# Patient Record
Sex: MALE | Race: BLACK OR AFRICAN AMERICAN | NOT HISPANIC OR LATINO | Employment: OTHER | URBAN - METROPOLITAN AREA
[De-identification: names, ages, dates, MRNs, and addresses within clinical notes are randomized per-mention and may not be internally consistent; named-entity substitution may affect disease eponyms.]

---

## 2017-02-14 ENCOUNTER — OFFICE VISIT (OUTPATIENT)
Dept: FAMILY MEDICINE | Facility: CLINIC | Age: 62
End: 2017-02-14
Payer: MEDICARE

## 2017-02-14 VITALS
HEIGHT: 72 IN | HEART RATE: 60 BPM | SYSTOLIC BLOOD PRESSURE: 136 MMHG | WEIGHT: 217.5 LBS | DIASTOLIC BLOOD PRESSURE: 88 MMHG | BODY MASS INDEX: 29.46 KG/M2

## 2017-02-14 DIAGNOSIS — M51.36 DDD (DEGENERATIVE DISC DISEASE), LUMBAR: ICD-10-CM

## 2017-02-14 DIAGNOSIS — M48.02 CERVICAL STENOSIS OF SPINE: Primary | ICD-10-CM

## 2017-02-14 DIAGNOSIS — M54.12 CERVICAL RADICULOPATHY: ICD-10-CM

## 2017-02-14 PROCEDURE — 99212 OFFICE O/P EST SF 10 MIN: CPT | Mod: PBBFAC,PO | Performed by: FAMILY MEDICINE

## 2017-02-14 PROCEDURE — 99999 PR PBB SHADOW E&M-EST. PATIENT-LVL II: CPT | Mod: PBBFAC,,, | Performed by: FAMILY MEDICINE

## 2017-02-14 PROCEDURE — 99213 OFFICE O/P EST LOW 20 MIN: CPT | Mod: S$PBB,,, | Performed by: FAMILY MEDICINE

## 2017-02-14 RX ORDER — HYDROCODONE BITARTRATE AND ACETAMINOPHEN 10; 325 MG/1; MG/1
1 TABLET ORAL 4 TIMES DAILY
Qty: 120 TABLET | Refills: 0 | Status: SHIPPED | OUTPATIENT
Start: 2017-04-13 | End: 2017-05-05

## 2017-02-14 RX ORDER — HYDROCODONE BITARTRATE AND ACETAMINOPHEN 10; 325 MG/1; MG/1
1 TABLET ORAL 4 TIMES DAILY
Qty: 120 TABLET | Refills: 0 | Status: SHIPPED | OUTPATIENT
Start: 2017-03-13 | End: 2017-05-05

## 2017-02-14 RX ORDER — GABAPENTIN 300 MG/1
600 CAPSULE ORAL 3 TIMES DAILY
Qty: 540 CAPSULE | Refills: 3 | Status: SHIPPED | OUTPATIENT
Start: 2017-02-14 | End: 2017-05-11 | Stop reason: SDUPTHER

## 2017-02-14 RX ORDER — HYDROCODONE BITARTRATE AND ACETAMINOPHEN 10; 325 MG/1; MG/1
1 TABLET ORAL 4 TIMES DAILY
Qty: 120 TABLET | Refills: 0 | Status: SHIPPED | OUTPATIENT
Start: 2017-02-14 | End: 2017-05-05 | Stop reason: SDUPTHER

## 2017-02-14 NOTE — PROGRESS NOTES
The patient presents today fu chronic pain management generally tolerating w HC 10   Hx cervical stenosis and lumbar disc disease w chonic pain and radiculopathy. He has DJD todd bilateral knees.  HBP and HLP controlled      Past Medical History:  Past Medical History   Diagnosis Date    Anticoagulant long-term use      aspirn    Asthma     Cervical stenosis of spine     DDD (degenerative disc disease), lumbar     Depression     DJD (degenerative joint disease)     Hyperlipidemia     Hypertension     Intervertebral disc protrusion     Skin abnormalities      Past Surgical History   Procedure Laterality Date    Colonoscopy      Multiple tooth extractions       Review of patient's allergies indicates:   Allergen Reactions    Ace inhibitors Swelling     Current Outpatient Prescriptions on File Prior to Visit   Medication Sig Dispense Refill    albuterol 90 mcg/actuation inhaler Inhale 2 puffs into the lungs every 6 (six) hours as needed for Wheezing. 54 g 4    aspirin 81 MG Chew Take 1 tablet (81 mg total) by mouth once daily. 90 tablet 3    atenolol (TENORMIN) 25 MG tablet Take 0.5 tablets (12.5 mg total) by mouth once daily. 90 tablet 3    diltiazem (DILACOR XR) 180 MG CDCR Take 1 capsule (180 mg total) by mouth 2 (two) times daily. 180 capsule 3    fluoxetine (PROZAC) 20 MG capsule Take 1 capsule (20 mg total) by mouth once daily. 90 capsule 3    gabapentin (NEURONTIN) 300 MG capsule Take 1 capsule (300 mg total) by mouth 4 (four) times daily. 360 capsule 3    hydrochlorothiazide (HYDRODIURIL) 12.5 MG Tab Take 1 tablet (12.5 mg total) by mouth once daily. 90 tablet 3    hydrocodone-acetaminophen 10-325mg (NORCO)  mg Tab Take 1 tablet by mouth 4 (four) times daily. 120 tablet 0    hydrocodone-acetaminophen 10-325mg (NORCO)  mg Tab Take 1 tablet by mouth 4 (four) times daily. 120 tablet 0    hydrocodone-acetaminophen 10-325mg (NORCO)  mg Tab Take 1 tablet by mouth 4 (four) times  daily. 120 tablet 0    ibuprofen (ADVIL,MOTRIN) 800 MG tablet Take 1 tablet (800 mg total) by mouth 4 (four) times daily. 360 tablet 3    ketoconazole (NIZORAL) 2 % shampoo Apply topically 3 (three) times a week. 120 mL 3    loratadine (CLARITIN) 10 mg tablet Take 1 tablet (10 mg total) by mouth once daily. 90 tablet 3    meloxicam (MOBIC) 15 MG tablet Take 0.5 tablets (7.5 mg total) by mouth once daily. 90 tablet 3    potassium chloride (KLOR-CON) 10 MEQ TbSR Take 2 tablets (20 mEq total) by mouth 2 (two) times daily. 180 tablet 3    simvastatin (ZOCOR) 20 MG tablet Take 1 tablet (20 mg total) by mouth every evening. 90 tablet 3    terbinafine HCl (LAMISIL) 1 % cream Apply topically 2 (two) times daily. 42 g 3    travoprost, benzalkonium, (TRAVATAN) 0.004 % ophthalmic solution Place 1 drop into both eyes nightly. 5 mL 3    triamcinolone acetonide 0.1% (KENALOG) 0.1 % Lotn Apply topically 2 (two) times daily. 60 mL 3    white petrolatum-mineral oil (EUCERIN) Crea Apply topically as needed. 454 g 3     No current facility-administered medications on file prior to visit.      Social History     Social History    Marital status:      Spouse name: N/A    Number of children: 3    Years of education: N/A     Occupational History    Not on file.     Social History Main Topics    Smoking status: Former Smoker     Packs/day: 0.25     Quit date: 5/12/2015    Smokeless tobacco: Never Used    Alcohol use No    Drug use: No    Sexual activity: Not on file     Other Topics Concern    Not on file     Social History Narrative     History reviewed. No pertinent family history.      ROS:GENERAL: No fever, chills, fatigability or weight loss.  SKIN: No rashes, itching or changes in color or texture of skin.  HEAD: No headaches or recent head trauma.EYES: Visual acuity fine. No photophobia, ocular pain or diplopia.EARS: Denies ear pain, discharge or vertigo.NOSE: No loss of smell, no epistaxis or postnasal  drip.MOUTH & THROAT: No hoarseness or change in voice. No excessive gum bleeding.NODES: Denies swollen glands.  CHEST: Denies CHESTER, cyanosis, wheezing, cough and sputum production.  CARDIOVASCULAR: Denies chest pain, PND, orthopnea or reduced exercise tolerance.  ABDOMEN: Appetite fine. No weight loss. Denies diarrhea, abdominal pain, hematemesis or blood in stool.  URINARY: No flank pain, dysuria or hematuria.  PERIPHERAL VASCULAR: No claudication or cyanosis.  MUSCULOSKELETAL: See above.  NEUROLOGIC: No history of seizures, paralysis, alteration of gait or coordination.  PE:   HEAD: Normocephalic, atraumatic.EYES: PERRL. EOMI.   EARS: TM's intact. Light reflex normal. No retraction or perforation.   NOSE: Mucosa pink. Airway clear.MOUTH & THROAT: No tonsillar enlargement. No pharyngeal erythema or exudate. No stridor.  NODES: No cervical, axillary or inguinal lymph node enlargement.  CHEST: Lungs clear to auscultation except scattered ronchi   CARDIOVASCULAR: Normal S1, S2. No rubs, murmurs or gallops.  ABDOMEN: Bowel sounds normal. Not distended. Soft. No tenderness or masses.  MUSCULOSKELETAL: Tender bilateral paracervical paralumbar neg SLR, tender knees and shoulders lt injected     NEUROLOGIC: Cranial Nerves: II-XII grossly intact.  Motor: 5/5 strength major flexors/extensors.  DTR's: Knees, Ankles 2+ and equal bilaterally; downgoing toes.  Sensory: Decr sensations hands  Gait & Posture: Antalgic gait     Impression: Cerv stenosis  Shoulder arthropathy  Radiculopathy  HBP  HLP     Plan:Rev med recs   Rev pain management hx of failed SOLIS lumbar   FU  Ortho  consult  Reviewed meds ,pain management ,risks benefits meds including narcotic issues continue [facility]

## 2017-04-28 ENCOUNTER — PATIENT OUTREACH (OUTPATIENT)
Dept: ADMINISTRATIVE | Facility: HOSPITAL | Age: 62
End: 2017-04-28

## 2017-04-28 DIAGNOSIS — Z13.220 SCREENING CHOLESTEROL LEVEL: Primary | ICD-10-CM

## 2017-04-28 NOTE — LETTER
April 28, 2017    Migue Sellers  [address] [address] [place] [address]           [facility]  [address]  [facility] [address]  Phone: [phone] Dear Mr. Sellers:    [facility] is committed to your overall health.  To help you get the most out of each of your visits, we will review your information to make sure you are up to date on all of your recommended tests and/or procedures.      Champ Kramer MD has found that you may be due for   Health Maintenance Due   Topic    Hepatitis C Screening     Lipid Panel       If you have had any of the above done at another facility, please bring the records or information with you so that your record at [facility] will be complete.    If you are currently taking medication, please bring it with you to your appointment for review.    We will be happy to assist you with scheduling any necessary appointments or you may contact the [facility] appointment desk at [phone] to schedule at your convenience.     Thank you for choosing [facility] for your healthcare needs,      If you have any questions or concerns, please don't hesitate to call.    Sincerely,    Ashely LA LPN  Care Coordination Department  [facility]

## 2017-05-05 ENCOUNTER — TELEPHONE (OUTPATIENT)
Dept: FAMILY MEDICINE | Facility: CLINIC | Age: 62
End: 2017-05-05

## 2017-05-05 RX ORDER — HYDROCODONE BITARTRATE AND ACETAMINOPHEN 10; 325 MG/1; MG/1
1 TABLET ORAL 4 TIMES DAILY
Qty: 360 TABLET | Refills: 0 | Status: SHIPPED | OUTPATIENT
Start: 2017-05-05 | End: 2017-08-03 | Stop reason: SDUPTHER

## 2017-05-05 NOTE — TELEPHONE ENCOUNTER
Pt has an appt on 5/11 for med refill. He is now wanting to get rx through mail order. He is asking if the rx can be sent now so he doesn't have a lapse in medication.

## 2017-05-05 NOTE — TELEPHONE ENCOUNTER
----- Message from Joan Gonzalez sent at 5/5/2017  1:57 PM CDT -----  Contact: Patient  Patient needs to order [id number]/Acetaminophen 325MG Tab thru [facility] Next Day delivery for 90 days at no cost.  Please call @ [phone] and advise when done.  Thanks/NORY

## 2017-05-11 ENCOUNTER — OFFICE VISIT (OUTPATIENT)
Dept: FAMILY MEDICINE | Facility: CLINIC | Age: 62
End: 2017-05-11
Payer: MEDICARE

## 2017-05-11 VITALS — WEIGHT: 217.63 LBS | HEIGHT: 72 IN | BODY MASS INDEX: 29.48 KG/M2

## 2017-05-11 DIAGNOSIS — M48.02 CERVICAL STENOSIS OF SPINE: Primary | ICD-10-CM

## 2017-05-11 DIAGNOSIS — M51.36 DDD (DEGENERATIVE DISC DISEASE), LUMBAR: ICD-10-CM

## 2017-05-11 DIAGNOSIS — J06.9 UPPER RESPIRATORY TRACT INFECTION, UNSPECIFIED TYPE: ICD-10-CM

## 2017-05-11 DIAGNOSIS — M54.12 CERVICAL RADICULOPATHY: ICD-10-CM

## 2017-05-11 PROCEDURE — 99213 OFFICE O/P EST LOW 20 MIN: CPT | Mod: S$PBB,,, | Performed by: FAMILY MEDICINE

## 2017-05-11 PROCEDURE — 99212 OFFICE O/P EST SF 10 MIN: CPT | Mod: PBBFAC,PO | Performed by: FAMILY MEDICINE

## 2017-05-11 PROCEDURE — 99999 PR PBB SHADOW E&M-EST. PATIENT-LVL II: CPT | Mod: PBBFAC,,, | Performed by: FAMILY MEDICINE

## 2017-05-11 RX ORDER — MELOXICAM 15 MG/1
7.5 TABLET ORAL DAILY
Qty: 90 TABLET | Refills: 3 | Status: SHIPPED | OUTPATIENT
Start: 2017-05-11 | End: 2021-03-29

## 2017-05-11 RX ORDER — METHYLPREDNISOLONE 4 MG/1
TABLET ORAL
Qty: 1 PACKAGE | Refills: 0 | Status: SHIPPED | OUTPATIENT
Start: 2017-05-11 | End: 2017-06-01

## 2017-05-11 RX ORDER — GABAPENTIN 300 MG/1
600 CAPSULE ORAL 3 TIMES DAILY
Qty: 540 CAPSULE | Refills: 3 | Status: SHIPPED | OUTPATIENT
Start: 2017-05-11

## 2017-05-11 RX ORDER — LORATADINE 10 MG/1
10 TABLET ORAL DAILY
Qty: 90 TABLET | Refills: 3 | Status: SHIPPED | OUTPATIENT
Start: 2017-05-11

## 2017-05-11 RX ORDER — ATENOLOL 25 MG/1
12.5 TABLET ORAL DAILY
Qty: 90 TABLET | Refills: 3 | Status: SHIPPED | OUTPATIENT
Start: 2017-05-11

## 2017-05-11 RX ORDER — SIMVASTATIN 20 MG/1
20 TABLET, FILM COATED ORAL NIGHTLY
Qty: 90 TABLET | Refills: 3 | Status: SHIPPED | OUTPATIENT
Start: 2017-05-11 | End: 2022-07-25 | Stop reason: ALTCHOICE

## 2017-05-11 RX ORDER — BUDESONIDE AND FORMOTEROL FUMARATE DIHYDRATE 80; 4.5 UG/1; UG/1
2 AEROSOL RESPIRATORY (INHALATION) DAILY PRN
COMMUNITY
Start: 2017-05-05

## 2017-05-11 RX ORDER — HYDROCHLOROTHIAZIDE 12.5 MG/1
12.5 TABLET ORAL DAILY
Qty: 90 TABLET | Refills: 3 | Status: SHIPPED | OUTPATIENT
Start: 2017-05-11 | End: 2022-01-27 | Stop reason: SDUPTHER

## 2017-05-11 RX ORDER — FLUOXETINE HYDROCHLORIDE 20 MG/1
20 CAPSULE ORAL DAILY
Qty: 90 CAPSULE | Refills: 3 | Status: SHIPPED | OUTPATIENT
Start: 2017-05-11 | End: 2018-01-09

## 2017-05-11 RX ORDER — AMLODIPINE BESYLATE 10 MG/1
10 TABLET ORAL DAILY
Qty: 90 TABLET | Refills: 3 | Status: SHIPPED | OUTPATIENT
Start: 2017-05-11 | End: 2022-01-27 | Stop reason: SINTOL

## 2017-05-11 RX ORDER — DILTIAZEM HYDROCHLORIDE 180 MG/1
180 CAPSULE, EXTENDED RELEASE ORAL 2 TIMES DAILY
Qty: 180 CAPSULE | Refills: 3 | Status: SHIPPED | OUTPATIENT
Start: 2017-05-11

## 2017-05-11 RX ORDER — POTASSIUM CHLORIDE 750 MG/1
20 TABLET, EXTENDED RELEASE ORAL 2 TIMES DAILY
Qty: 180 TABLET | Refills: 3 | Status: SHIPPED | OUTPATIENT
Start: 2017-05-11 | End: 2017-05-12 | Stop reason: SDUPTHER

## 2017-05-11 RX ORDER — AMLODIPINE BESYLATE 10 MG/1
TABLET ORAL
COMMUNITY
Start: 2017-05-05 | End: 2017-05-11 | Stop reason: SDUPTHER

## 2017-05-11 NOTE — PROGRESS NOTES
The patient presents today co pnd ur jaden and cough. Also fu chronic pain management generally tolerating w HC 10   Hx cervical stenosis and lumbar disc disease w chonic pain and radiculopathy. He has DJD todd bilateral knees.  HBP and HLP controlled      Past Medical History:  Past Medical History:   Diagnosis Date    Anticoagulant long-term use     aspirn    Asthma     Cervical stenosis of spine     DDD (degenerative disc disease), lumbar     Depression     DJD (degenerative joint disease)     Hyperlipidemia     Hypertension     Intervertebral disc protrusion     Skin abnormalities      Past Surgical History:   Procedure Laterality Date    COLONOSCOPY      MULTIPLE TOOTH EXTRACTIONS       Review of patient's allergies indicates:   Allergen Reactions    Ace inhibitors Swelling     Current Outpatient Prescriptions on File Prior to Visit   Medication Sig Dispense Refill    albuterol 90 mcg/actuation inhaler Inhale 2 puffs into the lungs every 6 (six) hours as needed for Wheezing. 54 g 4    aspirin 81 MG Chew Take 1 tablet (81 mg total) by mouth once daily. 90 tablet 3    atenolol (TENORMIN) 25 MG tablet Take 0.5 tablets (12.5 mg total) by mouth once daily. 90 tablet 3    diltiazem (DILACOR XR) 180 MG CDCR Take 1 capsule (180 mg total) by mouth 2 (two) times daily. 180 capsule 3    fluoxetine (PROZAC) 20 MG capsule Take 1 capsule (20 mg total) by mouth once daily. 90 capsule 3    gabapentin (NEURONTIN) 300 MG capsule Take 2 capsules (600 mg total) by mouth 3 (three) times daily. 540 capsule 3    hydrochlorothiazide (HYDRODIURIL) 12.5 MG Tab Take 1 tablet (12.5 mg total) by mouth once daily. 90 tablet 3    hydrocodone-acetaminophen 10-325mg (NORCO)  mg Tab Take 1 tablet by mouth 4 (four) times daily. 360 tablet 0    ibuprofen (ADVIL,MOTRIN) 800 MG tablet Take 1 tablet (800 mg total) by mouth 4 (four) times daily. 360 tablet 3    ketoconazole (NIZORAL) 2 % shampoo Apply topically 3 (three)  times a week. 120 mL 3    loratadine (CLARITIN) 10 mg tablet Take 1 tablet (10 mg total) by mouth once daily. 90 tablet 3    meloxicam (MOBIC) 15 MG tablet Take 0.5 tablets (7.5 mg total) by mouth once daily. 90 tablet 3    potassium chloride (KLOR-CON) 10 MEQ TbSR Take 2 tablets (20 mEq total) by mouth 2 (two) times daily. 180 tablet 3    simvastatin (ZOCOR) 20 MG tablet Take 1 tablet (20 mg total) by mouth every evening. 90 tablet 3    terbinafine HCl (LAMISIL) 1 % cream Apply topically 2 (two) times daily. 42 g 3    travoprost, benzalkonium, (TRAVATAN) 0.004 % ophthalmic solution Place 1 drop into both eyes nightly. 5 mL 3    triamcinolone acetonide 0.1% (KENALOG) 0.1 % Lotn Apply topically 2 (two) times daily. 60 mL 3    white petrolatum-mineral oil (EUCERIN) Crea Apply topically as needed. 454 g 3     No current facility-administered medications on file prior to visit.      Social History     Social History    Marital status:      Spouse name: N/A    Number of children: 3    Years of education: N/A     Occupational History    Not on file.     Social History Main Topics    Smoking status: Former Smoker     Packs/day: 0.25     Quit date: 5/12/2015    Smokeless tobacco: Never Used    Alcohol use No    Drug use: No    Sexual activity: Not on file     Other Topics Concern    Not on file     Social History Narrative     History reviewed. No pertinent family history.      ROS:GENERAL: No fever, chills, fatigability or weight loss.  SKIN: No rashes, itching or changes in color or texture of skin.  HEAD: No headaches or recent head trauma.EYES: Visual acuity fine. No photophobia, ocular pain or diplopia.EARS: Denies ear pain, discharge or vertigo.NOSE: No loss of smell, no epistaxis or postnasal drip.MOUTH & THROAT: No hoarseness or change in voice. No excessive gum bleeding.NODES: Denies swollen glands.  CHEST: Denies CHESTER, cyanosis, wheezing, cough and sputum production.  CARDIOVASCULAR: Denies  chest pain, PND, orthopnea or reduced exercise tolerance.  ABDOMEN: Appetite fine. No weight loss. Denies diarrhea, abdominal pain, hematemesis or blood in stool.  URINARY: No flank pain, dysuria or hematuria.  PERIPHERAL VASCULAR: No claudication or cyanosis.  MUSCULOSKELETAL: See above.  NEUROLOGIC: No history of seizures, paralysis, alteration of gait or coordination.  PE:   HEAD: Normocephalic, atraumatic.EYES: PERRL. EOMI.   EARS: TM's intact. Light reflex normal. No retraction or perforation.   NOSE: Mucosa pink. Airway clear.MOUTH & THROAT: No tonsillar enlargement. No pharyngeal erythema or exudate. No stridor.  NODES: No cervical, axillary or inguinal lymph node enlargement.  CHEST: Lungs clear to auscultation except scattered ronchi   CARDIOVASCULAR: Normal S1, S2. No rubs, murmurs or gallops.  ABDOMEN: Bowel sounds normal. Not distended. Soft. No tenderness or masses.  MUSCULOSKELETAL: Tender bilateral paracervical paralumbar neg SLR, tender knees and shoulders lt injected     NEUROLOGIC: Cranial Nerves: II-XII grossly intact.  Motor: 5/5 strength major flexors/extensors.  DTR's: Knees, Ankles 2+ and equal bilaterally; downgoing toes.  Sensory: Decr sensations hands  Gait & Posture: Antalgic gait     Impression: URI  Cerv stenosis  Shoulder arthropathy  Radiculopathy  HBP  HLP     Plan:Medrol dspk  Rev med recs   Rev pain management hx of failed SOLIS lumbar   FU  Ortho  consult  Reviewed meds ,pain management ,risks benefits meds

## 2017-05-12 RX ORDER — POTASSIUM CHLORIDE 750 MG/1
20 TABLET, EXTENDED RELEASE ORAL 2 TIMES DAILY
Qty: 360 TABLET | Refills: 3 | Status: SHIPPED | OUTPATIENT
Start: 2017-05-12

## 2017-06-02 ENCOUNTER — TELEPHONE (OUTPATIENT)
Dept: NEUROSURGERY | Facility: CLINIC | Age: 62
End: 2017-06-02

## 2017-06-02 NOTE — TELEPHONE ENCOUNTER
----- Message from Chance Ruff sent at 5/31/2017 12:31 PM CDT -----  Contact: Patient  Patient wants to know if the  accepts  the [employer] program. Please contact [phone].

## 2017-06-13 ENCOUNTER — TELEPHONE (OUTPATIENT)
Dept: PAIN MEDICINE | Facility: CLINIC | Age: 62
End: 2017-06-13

## 2017-06-13 NOTE — TELEPHONE ENCOUNTER
Attempted to contact the patient . Phone rings then goes busy. According to the last clinical visit a cervical steroid injection could be repeated.

## 2017-06-13 NOTE — TELEPHONE ENCOUNTER
----- Message from Kayla Tan sent at 6/12/2017 12:26 PM CDT -----  Patient is requesting a return call concerning scheduling injections, contact patient at [phone].    Thank you

## 2017-06-16 DIAGNOSIS — M54.12 CERVICAL RADICULOPATHY: Primary | ICD-10-CM

## 2017-06-16 RX ORDER — MIDAZOLAM HYDROCHLORIDE 5 MG/ML
4 INJECTION INTRAMUSCULAR; INTRAVENOUS ONCE
Status: CANCELLED | OUTPATIENT
Start: 2017-07-24

## 2017-06-16 RX ORDER — SODIUM CHLORIDE, SODIUM LACTATE, POTASSIUM CHLORIDE, CALCIUM CHLORIDE 600; 310; 30; 20 MG/100ML; MG/100ML; MG/100ML; MG/100ML
INJECTION, SOLUTION INTRAVENOUS CONTINUOUS
Status: CANCELLED | OUTPATIENT
Start: 2017-07-24

## 2017-06-16 NOTE — TELEPHONE ENCOUNTER
Spoke with patient. Procedure scheduled for 7/24 with 4 week follow up. Pre-op instructions mailed to patient.

## 2017-06-23 ENCOUNTER — TELEPHONE (OUTPATIENT)
Dept: PAIN MEDICINE | Facility: CLINIC | Age: 62
End: 2017-06-23

## 2017-06-23 NOTE — TELEPHONE ENCOUNTER
He will need to be seen in the clinic to get a lumbar injection approved because we have not done this for him before.  Does he have a recent lumbar spine MRI?  He has an injection follow-up with me on 8/21.  I can evaluate him at that time or he can come in sooner if he would like.

## 2017-06-23 NOTE — TELEPHONE ENCOUNTER
Spoke with patient. Patient is stating he is having lower back pain and would like to get a lumbar steroid injection after his cervical. Please advise.

## 2017-06-23 NOTE — TELEPHONE ENCOUNTER
----- Message from Anna Sidhu sent at 6/22/2017  3:22 PM CDT -----  Contact: [phone]  Patient is requesting a call back from the nurse have concerns about upcoming procedures and also want to know was paper work received.    Please call the patient upon request at phone number [phone].

## 2017-06-23 NOTE — TELEPHONE ENCOUNTER
Spoke with patient. Patient stated last MRI he got was 2-3 years ago. Patient stated he will bring this on next follow up.

## 2017-07-24 ENCOUNTER — HOSPITAL ENCOUNTER (OUTPATIENT)
Dept: RADIOLOGY | Facility: HOSPITAL | Age: 62
Discharge: HOME | End: 2017-07-24
Attending: ANESTHESIOLOGY
Payer: OTHER GOVERNMENT

## 2017-07-24 ENCOUNTER — HOSPITAL ENCOUNTER (OUTPATIENT)
Facility: HOSPITAL | Age: 62
Discharge: HOME | End: 2017-07-24
Attending: ANESTHESIOLOGY | Admitting: ANESTHESIOLOGY
Payer: OTHER GOVERNMENT

## 2017-07-24 ENCOUNTER — SURGERY (OUTPATIENT)
Age: 62
End: 2017-07-24

## 2017-07-24 VITALS
HEART RATE: 66 BPM | OXYGEN SATURATION: 96 % | SYSTOLIC BLOOD PRESSURE: 130 MMHG | TEMPERATURE: 98 F | DIASTOLIC BLOOD PRESSURE: 78 MMHG | RESPIRATION RATE: 16 BRPM

## 2017-07-24 DIAGNOSIS — M50.30 DDD (DEGENERATIVE DISC DISEASE), CERVICAL: ICD-10-CM

## 2017-07-24 DIAGNOSIS — M54.12 CERVICAL RADICULOPATHY: Primary | ICD-10-CM

## 2017-07-24 PROCEDURE — 63600175 PHARM REV CODE 636 W HCPCS: Mod: PO | Performed by: ANESTHESIOLOGY

## 2017-07-24 PROCEDURE — 25000003 PHARM REV CODE 250: Mod: PO | Performed by: ANESTHESIOLOGY

## 2017-07-24 PROCEDURE — 76000 FLUOROSCOPY <1 HR PHYS/QHP: CPT | Mod: TC,PO

## 2017-07-24 PROCEDURE — 25500020 PHARM REV CODE 255: Mod: PO | Performed by: ANESTHESIOLOGY

## 2017-07-24 PROCEDURE — 62321 NJX INTERLAMINAR CRV/THRC: CPT | Mod: ,,, | Performed by: ANESTHESIOLOGY

## 2017-07-24 PROCEDURE — 99152 MOD SED SAME PHYS/QHP 5/>YRS: CPT | Mod: ,,, | Performed by: ANESTHESIOLOGY

## 2017-07-24 PROCEDURE — 62321 NJX INTERLAMINAR CRV/THRC: CPT | Mod: PO | Performed by: ANESTHESIOLOGY

## 2017-07-24 RX ORDER — LIDOCAINE HYDROCHLORIDE 10 MG/ML
INJECTION, SOLUTION EPIDURAL; INFILTRATION; INTRACAUDAL; PERINEURAL
Status: DISCONTINUED | OUTPATIENT
Start: 2017-07-24 | End: 2017-07-24 | Stop reason: HOSPADM

## 2017-07-24 RX ORDER — METHYLPREDNISOLONE ACETATE 80 MG/ML
INJECTION, SUSPENSION INTRA-ARTICULAR; INTRALESIONAL; INTRAMUSCULAR; SOFT TISSUE
Status: DISCONTINUED | OUTPATIENT
Start: 2017-07-24 | End: 2017-07-24 | Stop reason: HOSPADM

## 2017-07-24 RX ORDER — MIDAZOLAM HYDROCHLORIDE 5 MG/ML
4 INJECTION INTRAMUSCULAR; INTRAVENOUS ONCE
Status: COMPLETED | OUTPATIENT
Start: 2017-07-24 | End: 2017-07-24

## 2017-07-24 RX ORDER — SODIUM CHLORIDE 9 MG/ML
INJECTION, SOLUTION INTRAMUSCULAR; INTRAVENOUS; SUBCUTANEOUS
Status: DISCONTINUED | OUTPATIENT
Start: 2017-07-24 | End: 2017-07-24 | Stop reason: HOSPADM

## 2017-07-24 RX ORDER — SODIUM CHLORIDE, SODIUM LACTATE, POTASSIUM CHLORIDE, CALCIUM CHLORIDE 600; 310; 30; 20 MG/100ML; MG/100ML; MG/100ML; MG/100ML
INJECTION, SOLUTION INTRAVENOUS CONTINUOUS
Status: DISCONTINUED | OUTPATIENT
Start: 2017-07-24 | End: 2017-07-24 | Stop reason: HOSPADM

## 2017-07-24 RX ADMIN — LIDOCAINE HYDROCHLORIDE 5 ML: 10 INJECTION, SOLUTION EPIDURAL; INFILTRATION; INTRACAUDAL; PERINEURAL at 09:07

## 2017-07-24 RX ADMIN — IOHEXOL 3 ML: 300 INJECTION, SOLUTION INTRAVENOUS at 09:07

## 2017-07-24 RX ADMIN — MIDAZOLAM HYDROCHLORIDE 4 MG: 5 INJECTION, SOLUTION INTRAMUSCULAR; INTRAVENOUS at 09:07

## 2017-07-24 RX ADMIN — SODIUM CHLORIDE 4 ML: 9 INJECTION INTRAMUSCULAR; INTRAVENOUS; SUBCUTANEOUS at 09:07

## 2017-07-24 RX ADMIN — METHYLPREDNISOLONE ACETATE 80 MG: 80 INJECTION, SUSPENSION INTRA-ARTICULAR; INTRALESIONAL; INTRAMUSCULAR; SOFT TISSUE at 09:07

## 2017-07-24 RX ADMIN — SODIUM CHLORIDE, SODIUM LACTATE, POTASSIUM CHLORIDE, AND CALCIUM CHLORIDE: .6; .31; .03; .02 INJECTION, SOLUTION INTRAVENOUS at 08:07

## 2017-07-24 NOTE — H&P
CC: Neck pain    HPI: The patient is a 60yo man with a history of cervical radiculopathy here for cervical SOLIS. There are no major changes in history and physical from 5/11/17 by Dr. Kramer.    Past Medical History:   Diagnosis Date    Anticoagulant long-term use     aspirn    Asthma     Cervical stenosis of spine     DDD (degenerative disc disease), lumbar     Depression     DJD (degenerative joint disease)     Hyperlipidemia     Hypertension     Intervertebral disc protrusion     Skin abnormalities        Past Surgical History:   Procedure Laterality Date    COLONOSCOPY      MULTIPLE TOOTH EXTRACTIONS         No family history on file.    Social History     Social History    Marital status:      Spouse name: N/A    Number of children: 3    Years of education: N/A     Social History Main Topics    Smoking status: Former Smoker     Packs/day: 0.25     Quit date: 5/12/2015    Smokeless tobacco: Never Used    Alcohol use No    Drug use: No    Sexual activity: Not on file     Other Topics Concern    Not on file     Social History Narrative    No narrative on file       No current facility-administered medications for this encounter.        Review of patient's allergies indicates:   Allergen Reactions    Ace inhibitors Swelling       Vitals:    07/24/17 0842   BP: 132/76   Pulse: (!) 57   Resp: 18   Temp: 97.3 °F (36.3 °C)   TempSrc: Skin   SpO2: 100%       REVIEW OF SYSTEMS:     GENERAL: No weight loss, malaise or fevers.  HEENT:  No recent changes in vision or hearing  NECK: Negative for lumps, no difficulty with swallowing.  RESPIRATORY: Negative for cough, wheezing or shortness of breath, patient denies any recent URI.  CARDIOVASCULAR: Negative for chest pain, leg swelling or palpitations.  GI: Negative for abdominal discomfort, blood in stools or black stools or change in bowel habits.  MUSCULOSKELETAL: See HPI.  SKIN: Negative for lesions, rash, and itching.  PSYCH: No suicidal or  homicidal ideations, no current mood disturbances.  HEMATOLOGY/LYMPHOLOGY: Negative for prolonged bleeding, bruising easily or swollen nodes. Patient is not currently taking any anti-coagulants  ENDO: No history of diabetes or thyroid dysfunction  NEURO: No history of syncope, paralysis, seizures or tremors.All other reviewed and negative other than HPI.    Physical exam:  Gen: A and O x3, pleasant, well-groomed  Skin: No rashes or obvious lesions  HEENT: PERRLA, no obvious deformities on ears or in canals. No thyroid masses, trachea midline, no palpable lymph nodes in neck, axilla.  CVS: Regular rate and rhythm, normal S1 and S2, no murmurs.  Resp: Clear to auscultation bilaterally.  Abdomen: Soft, NT/ND, normal bowel sounds present.  Musculoskeletal/Neuro: Moving all extremities    Assessment:  Cervical radiculopathy  -     Case Request Operating Room: INJECTION-STEROID-EPIDURAL-CERVICAL  -     Activity as tolerated; Standing  -     Place in Outpatient; Standing  -     Diet NPO; Standing  -     lactated ringers infusion; Inject into the vein continuous.  -     midazolam (PF) injection 4 mg; Inject 0.8 mLs (4 mg total) into the vein once.  -     Notify physician ; Standing  -     Notify physician ; Standing  -     Notify physician (specify); Standing  -     Place 18-22 gauage peripheral IV ; Standing  -     Verify informed consent; Standing  -     Vital signs; Standing

## 2017-07-24 NOTE — DISCHARGE SUMMARY
[facility]  Discharge Note  Short Stay    Admit Date: 7/24/2017    Discharge Date: 7/24/2017    Attending Physician: Phil Ann MD     Discharge Provider: Phil Ann    Diagnoses:  Active Hospital Problems    Diagnosis  POA    *Cervical radiculopathy [M54.12]  Yes      Resolved Hospital Problems    Diagnosis Date Resolved POA   No resolved problems to display.       Discharged Condition: good    Final Diagnoses: Cervical radiculopathy [M54.12]    Disposition: Home or Self Care    Hospital Course: no complications, uneventful    Outcome of Hospitalization, Treatment, Procedure, or Surgery:  Patient was admitted for outpatient procedure. The patient underwent procedure without complications and are discharged home    Follow up/Patient Instructions:  Follow up as scheduled/Patient has received instructions and follow up date    Medications:  Continue previous medications      Discharge Procedure Orders  Diet general     Activity as tolerated     Call MD for:  temperature >100.4     Call MD for:  severe uncontrolled pain     Call MD for:  redness, tenderness, or signs of infection (pain, swelling, redness, odor or green/yellow discharge around incision site)     Call MD for:  severe persistent headache     No dressing needed           Discharge Procedure Orders (must include Diet, Follow-up, Activity):    Discharge Procedure Orders (must include Diet, Follow-up, Activity)  Diet general     Activity as tolerated     Call MD for:  temperature >100.4     Call MD for:  severe uncontrolled pain     Call MD for:  redness, tenderness, or signs of infection (pain, swelling, redness, odor or green/yellow discharge around incision site)     Call MD for:  severe persistent headache     No dressing needed

## 2017-07-24 NOTE — OP NOTE
PROCEDURE DATE: 7/24/2017    Procedure: C7-T1 cervical interlaminar epidural steroid injection under utilizing fluoroscopy.    Diagnosis: Cervical Radiculopathy    POSTOP DIAGNOSIS: SAME    Physician: Phil Ann MD    Medications injected:  Methylprednisone 80mg followed by a slow injection of 4 mL sterile, preservative-free normal saline.    Local anesthetic used: Lidocaine 1%, 4 ml.    Sedation Medications: 4mg versed    Complications:  none    Estimated blood loss: none    Technique:  A time-out was taken to identify patient and procedure prior to starting the procedure.  With the patient laying in a prone position with the neck in a mid-flexed forward position, the area was prepped and draped in the usual sterile fashion using ChloraPrep and a fenestrated drape.  The area was determined under AP fluoroscopic guidance.  Local anesthetic was given using a 25-gauge 1.5 inch needle by raising a wheal and then infiltrating ventrally.  A 3.5 inch 20-gauge Touhy needle was introduced under fluoroscopic guidance to meet the lamina of C7.  The needle was then hinged under the lamina then advanced using loss of resistance technique.  Once the tip of the needle was in the desired position, the contrast dye Omnipaque was injected to determine placement and no uptake.  The steroid was then injected slowly followed by a slow injection of 4 mL of the sterile preservative-free normal saline.  The patient tolerated the procedure well.    The patient was monitored after the procedure and was given post-procedure and discharge instructions to follow at home. The patient was discharged in a stable condition.

## 2017-08-03 ENCOUNTER — OFFICE VISIT (OUTPATIENT)
Dept: FAMILY MEDICINE | Facility: CLINIC | Age: 62
End: 2017-08-03
Payer: MEDICARE

## 2017-08-03 VITALS
BODY MASS INDEX: 30.36 KG/M2 | WEIGHT: 224.13 LBS | DIASTOLIC BLOOD PRESSURE: 76 MMHG | SYSTOLIC BLOOD PRESSURE: 127 MMHG | HEIGHT: 72 IN | HEART RATE: 57 BPM

## 2017-08-03 DIAGNOSIS — M48.02 CERVICAL STENOSIS OF SPINE: Primary | ICD-10-CM

## 2017-08-03 DIAGNOSIS — M54.12 CERVICAL RADICULOPATHY: ICD-10-CM

## 2017-08-03 DIAGNOSIS — M51.36 DDD (DEGENERATIVE DISC DISEASE), LUMBAR: ICD-10-CM

## 2017-08-03 PROCEDURE — 99212 OFFICE O/P EST SF 10 MIN: CPT | Mod: PBBFAC,PO | Performed by: FAMILY MEDICINE

## 2017-08-03 PROCEDURE — 99999 PR PBB SHADOW E&M-EST. PATIENT-LVL II: CPT | Mod: PBBFAC,,, | Performed by: FAMILY MEDICINE

## 2017-08-03 PROCEDURE — 99214 OFFICE O/P EST MOD 30 MIN: CPT | Mod: S$PBB,,, | Performed by: FAMILY MEDICINE

## 2017-08-03 PROCEDURE — 3008F BODY MASS INDEX DOCD: CPT | Mod: ,,, | Performed by: FAMILY MEDICINE

## 2017-08-03 RX ORDER — HYDROCODONE BITARTRATE AND ACETAMINOPHEN 10; 325 MG/1; MG/1
1 TABLET ORAL 4 TIMES DAILY
Qty: 360 TABLET | Refills: 0 | Status: SHIPPED | OUTPATIENT
Start: 2017-08-03 | End: 2017-11-02 | Stop reason: SDUPTHER

## 2017-08-03 NOTE — PROGRESS NOTES
The patient presents today fu chronic pain management generally tolerating w HC 10   Hx cervical stenosis and lumbar disc disease w chonic pain and radiculopathy. He has DJD todd bilateral knees.  HBP and HLP controlled      Past Medical History:  Past Medical History:   Diagnosis Date    Anticoagulant long-term use     aspirn    Asthma     Cervical stenosis of spine     DDD (degenerative disc disease), lumbar     Depression     DJD (degenerative joint disease)     Hyperlipidemia     Hypertension     Intervertebral disc protrusion     Skin abnormalities      Past Surgical History:   Procedure Laterality Date    COLONOSCOPY      MULTIPLE TOOTH EXTRACTIONS       Review of patient's allergies indicates:   Allergen Reactions    Ace inhibitors Swelling     Current Outpatient Prescriptions on File Prior to Visit   Medication Sig Dispense Refill    albuterol 90 mcg/actuation inhaler Inhale 2 puffs into the lungs every 6 (six) hours as needed for Wheezing. 54 g 4    amlodipine (NORVASC) 10 MG tablet Take 1 tablet (10 mg total) by mouth once daily. 90 tablet 3    aspirin 81 MG Chew Take 1 tablet (81 mg total) by mouth once daily. 90 tablet 3    atenolol (TENORMIN) 25 MG tablet Take 0.5 tablets (12.5 mg total) by mouth once daily. 90 tablet 3    diltiaZEM (DILACOR XR) 180 MG CDCR Take 1 capsule (180 mg total) by mouth 2 (two) times daily. 180 capsule 3    fluoxetine (PROZAC) 20 MG capsule Take 1 capsule (20 mg total) by mouth once daily. 90 capsule 3    gabapentin (NEURONTIN) 300 MG capsule Take 2 capsules (600 mg total) by mouth 3 (three) times daily. 540 capsule 3    hydrochlorothiazide (HYDRODIURIL) 12.5 MG Tab Take 1 tablet (12.5 mg total) by mouth once daily. 90 tablet 3    hydrocodone-acetaminophen 10-325mg (NORCO)  mg Tab Take 1 tablet by mouth 4 (four) times daily. 360 tablet 0    ibuprofen (ADVIL,MOTRIN) 800 MG tablet Take 1 tablet (800 mg total) by mouth 4 (four) times daily. 360 tablet 3     ketoconazole (NIZORAL) 2 % shampoo Apply topically 3 (three) times a week. 120 mL 3    loratadine (CLARITIN) 10 mg tablet Take 1 tablet (10 mg total) by mouth once daily. 90 tablet 3    LUBRICANT EYE, POLYV ALCOHOL, 1.4 % ophthalmic solution       meloxicam (MOBIC) 15 MG tablet Take 0.5 tablets (7.5 mg total) by mouth once daily. 90 tablet 3    potassium chloride (KLOR-CON) 10 MEQ TbSR Take 2 tablets (20 mEq total) by mouth 2 (two) times daily. 360 tablet 3    simvastatin (ZOCOR) 20 MG tablet Take 1 tablet (20 mg total) by mouth every evening. 90 tablet 3    SYMBICORT 80-4.5 mcg/actuation HFAA       terbinafine HCl (LAMISIL) 1 % cream Apply topically 2 (two) times daily. 42 g 3    travoprost, benzalkonium, (TRAVATAN) 0.004 % ophthalmic solution Place 1 drop into both eyes nightly. 5 mL 3    triamcinolone acetonide 0.1% (KENALOG) 0.1 % Lotn Apply topically 2 (two) times daily. 60 mL 3    white petrolatum-mineral oil (EUCERIN) Crea Apply topically as needed. 454 g 3     No current facility-administered medications on file prior to visit.      Social History     Social History    Marital status:      Spouse name: N/A    Number of children: 3    Years of education: N/A     Occupational History    Not on file.     Social History Main Topics    Smoking status: Former Smoker     Packs/day: 0.25     Quit date: 5/12/2015    Smokeless tobacco: Never Used      Comment: smokes once per month    Alcohol use No    Drug use: No    Sexual activity: Not on file     Other Topics Concern    Not on file     Social History Narrative    No narrative on file     History reviewed. No pertinent family history.      ROS:GENERAL: No fever, chills, fatigability or weight loss.  SKIN: No rashes, itching or changes in color or texture of skin.  HEAD: No headaches or recent head trauma.EYES: Visual acuity fine. No photophobia, ocular pain or diplopia.EARS: Denies ear pain, discharge or vertigo.NOSE: No loss of  smell, no epistaxis or postnasal drip.MOUTH & THROAT: No hoarseness or change in voice. No excessive gum bleeding.NODES: Denies swollen glands.  CHEST: Denies CHESTER, cyanosis, wheezing, cough and sputum production.  CARDIOVASCULAR: Denies chest pain, PND, orthopnea or reduced exercise tolerance.  ABDOMEN: Appetite fine. No weight loss. Denies diarrhea, abdominal pain, hematemesis or blood in stool.  URINARY: No flank pain, dysuria or hematuria.  PERIPHERAL VASCULAR: No claudication or cyanosis.  MUSCULOSKELETAL: See above.  NEUROLOGIC: No history of seizures, paralysis, alteration of gait or coordination.  PE:   HEAD: Normocephalic, atraumatic.EYES: PERRL. EOMI.   EARS: TM's intact. Light reflex normal. No retraction or perforation.   NOSE: Mucosa pink. Airway clear.MOUTH & THROAT: No tonsillar enlargement. No pharyngeal erythema or exudate. No stridor.  NODES: No cervical, axillary or inguinal lymph node enlargement.  CHEST: Lungs clear to auscultation except scattered ronchi   CARDIOVASCULAR: Normal S1, S2. No rubs, murmurs or gallops.  ABDOMEN: Bowel sounds normal. Not distended. Soft. No tenderness or masses.  MUSCULOSKELETAL: Tender bilateral paracervical paralumbar neg SLR, tender knees and shoulders lt injected     NEUROLOGIC: Cranial Nerves: II-XII grossly intact.  Motor: 5/5 strength major flexors/extensors.  DTR's: Knees, Ankles 2+ and equal bilaterally; downgoing toes.  Sensory: Decr sensations hands  Gait & Posture: Antalgic gait     Impression: Cerv stenosis  Shoulder arthropathy  Radiculopathy  HBP  HLP     Plan:   Rev med recs   Rev pain management    Reviewed meds ,pain management ,risks benefits meds

## 2017-08-21 ENCOUNTER — TELEPHONE (OUTPATIENT)
Dept: PAIN MEDICINE | Facility: CLINIC | Age: 62
End: 2017-08-21

## 2017-08-21 ENCOUNTER — OFFICE VISIT (OUTPATIENT)
Dept: PAIN MEDICINE | Facility: CLINIC | Age: 62
End: 2017-08-21
Payer: MEDICARE

## 2017-08-21 VITALS
BODY MASS INDEX: 27.95 KG/M2 | HEART RATE: 70 BPM | SYSTOLIC BLOOD PRESSURE: 120 MMHG | DIASTOLIC BLOOD PRESSURE: 66 MMHG | RESPIRATION RATE: 18 BRPM | TEMPERATURE: 99 F | WEIGHT: 206.13 LBS

## 2017-08-21 DIAGNOSIS — M48.061 LUMBAR STENOSIS: ICD-10-CM

## 2017-08-21 DIAGNOSIS — M54.12 CERVICAL RADICULOPATHY: Primary | ICD-10-CM

## 2017-08-21 DIAGNOSIS — M48.02 CERVICAL STENOSIS OF SPINE: ICD-10-CM

## 2017-08-21 DIAGNOSIS — M51.36 DDD (DEGENERATIVE DISC DISEASE), LUMBAR: ICD-10-CM

## 2017-08-21 PROCEDURE — 3074F SYST BP LT 130 MM HG: CPT | Mod: ,,, | Performed by: PHYSICIAN ASSISTANT

## 2017-08-21 PROCEDURE — 3078F DIAST BP <80 MM HG: CPT | Mod: ,,, | Performed by: PHYSICIAN ASSISTANT

## 2017-08-21 PROCEDURE — 99215 OFFICE O/P EST HI 40 MIN: CPT | Mod: PBBFAC,PO | Performed by: PHYSICIAN ASSISTANT

## 2017-08-21 PROCEDURE — 99999 PR PBB SHADOW E&M-EST. PATIENT-LVL V: CPT | Mod: PBBFAC,,, | Performed by: PHYSICIAN ASSISTANT

## 2017-08-21 PROCEDURE — 99214 OFFICE O/P EST MOD 30 MIN: CPT | Mod: S$PBB,,, | Performed by: PHYSICIAN ASSISTANT

## 2017-08-21 RX ORDER — SODIUM CHLORIDE, SODIUM LACTATE, POTASSIUM CHLORIDE, CALCIUM CHLORIDE 600; 310; 30; 20 MG/100ML; MG/100ML; MG/100ML; MG/100ML
INJECTION, SOLUTION INTRAVENOUS CONTINUOUS
Status: CANCELLED | OUTPATIENT
Start: 2017-09-12

## 2017-08-21 RX ORDER — MIDAZOLAM HYDROCHLORIDE 5 MG/ML
4 INJECTION INTRAMUSCULAR; INTRAVENOUS ONCE
Status: CANCELLED | OUTPATIENT
Start: 2017-09-12

## 2017-08-21 NOTE — PROGRESS NOTES
This note was completed with dictation software and grammatical errors may exist.    CC: Neck pain, back pain    HPI: The patient is a 61-year-old man with a history of hypertension, chronic neck and back pain who presents in referral from [facility] for continued neck and back pain.  He is status post C7-T1 cervical interlaminar epidural steroid on 7/24/17 with 25% relief.  He complains of neck pain greater than back pain.  He describes his neck pain as sharp, radiating to his left shoulder and down his left arm.  This is worse with using his arm and turning his head.  He also complains of bilateral low back pain radiating to the bilateral buttocks.  He describes this as grinding, squeezing, worse with standing and improved with leaning forward.  He reports tingling in his toes and intermittent weakness that can be in any extremity.  He denies bladder or bowel incontinence.    [facility] gabapentin, Robaxin, capsaicin cream for his neck.  He has had lumbar epidural steroid injections but nothing for his neck.  He is status post C7-T1 cervical interlaminar epidural steroid injection on 8/22/16 with what sounds like moderate relief.  He is status post C7-T1 cervical interlaminar epidural steroid on 7/24/17 with 25% relief.  He is not interested in doing any physical therapy.    ROS: He reports back pain, neck pain.  Balance of review of systems is negative.    Medical, surgical, family and social history reviewed elsewhere in record.    Medications/Allergies: See med card    Vitals:    08/21/17 1337   BP: 120/66   Pulse: 70   Resp: 18   Temp: 98.6 °F (37 °C)   TempSrc: Oral   Weight: 93.5 kg (206 lb 1.6 oz)   PainSc:   4   PainLoc: Neck         Physical exam:  Gen: A and O x3, pleasant, well-groomed  Skin: No rashes or obvious lesions  HEENT: PERRLA, no obvious deformities on ears or in canals.Trachea midline.  CVS: Regular rate and  rhythm, normal palpable pulses.  Resp: Clear to auscultation bilaterally, no wheezes or rales.  Abdomen: Soft, NT/ND.  Musculoskeletal:  No antalgic gait.     Neuro:  Upper extremities: 5/5 strength bilaterally   Reflexes: Brachioradialis 2+, Bicep 2+, Tricep 2+.   Sensory: Intact and symmetrical to light touch and pinprick in C2-T1 dermatomes bilaterally.    Cervical Spine:  Cervical spine: Range of motion is full with flexion, moderately reduced with extension and lateral rotation especially with lateral rotation to the left causing increased neck pain.  Spurling's maneuver causes neck pain to either side, left greater than right.  Myofascial exam: Mild Tenderness to palpation across cervical paraspinous region bilaterally.    Imaging:  MRI cervical spine 5/16/16: C1/2 normal.  C2/3 mild facet arthropathy.  At C3/4 mild facet arthropathy.  C4/5 mild small broad-based posterior disc bulge and facet arthrosis resulting in ventral CSF space effacement and mild ventral cord flattening.  At C5/6 there is broad-based disc bulge with focal left paracentral disc protrusion bilateral facet arthrosis and left uncovertebral spurring resulting in severe left neural foraminal narrowing, ventral CSF space effacement and mild ventral cord flattening.  At C6/7 there is broad-based posterior disc bulge and osteophytes bilateral uncovertebral spurring and facet arthropathy resulting in partial ventral CSF space effacement, mild ventral cord flattening and severe bilateral foraminal stenosis.  At C7/T1 there is broad-based posterior disc bulge with bilateral facet arthrosis and left uncovertebral spurring resulting in partial CSF space effacement and ventral cord flattening and severe left neural foraminal stenosis.    CT lumbar spine report 7/15/11  L2-3 mild disc bulge  L3-4 mild bulging of the disc with flattening of the ventral thecal sac resulting in mild narrowing of the foramina  L4-5 mild bulging of the disc is present along  with ligamentum flavum hypertrophy combine to result in a moderate degree of canal stenosis with mild foraminal narrowing      Assessment:  The patient is a 61-year-old man with a history of hypertension, chronic neck and back pain who presents in referral from [facility] for continued neck and back pain.   1. Cervical radiculopathy  Vital signs    Activity as tolerated    Place 18-22 gauage peripheral IV     Verify informed consent    Notify physician     Notify physician     Notify physician (specify)    Diet NPO    Case Request Operating Room: INJECTION-STEROID-EPIDURAL-CERVICAL    Place in Outpatient    lactated ringers infusion    midazolam (PF) injection 4 mg   2. Cervical stenosis of spine     3. DDD (degenerative disc disease), lumbar  MRI Lumbar Spine Without Contrast   4. Lumbar stenosis           Plan:  1.  The patient had mild relief following the cervical SOLIS and I will set him up to repeat this to see if he can get closer to full relief.  2.  We discussed his lumbar spine CT results from 2011 and he had moderate canal stenosis at L4-5 at that time.  I have ordered a new lumbar spine MRI to evaluate for progression and we discussed possibly trying an L5/S1 interlaminar SOLIS for his back pain in the future.  3.  Follow-up in 4 weeks post procedure sooner as needed.    Greater than 50% of this 30 minute visit was spent on counseling the patient.

## 2017-08-21 NOTE — TELEPHONE ENCOUNTER
Patient takes aspirin as a preventative medication and has been instructed to stop 7 days before the injection.

## 2017-08-25 ENCOUNTER — HOSPITAL ENCOUNTER (OUTPATIENT)
Dept: RADIOLOGY | Facility: HOSPITAL | Age: 62
Discharge: HOME | End: 2017-08-25
Attending: PHYSICIAN ASSISTANT
Payer: MEDICARE

## 2017-08-25 DIAGNOSIS — M51.36 DDD (DEGENERATIVE DISC DISEASE), LUMBAR: ICD-10-CM

## 2017-08-25 PROCEDURE — 72148 MRI LUMBAR SPINE W/O DYE: CPT | Mod: 26,,, | Performed by: RADIOLOGY

## 2017-08-25 PROCEDURE — 72148 MRI LUMBAR SPINE W/O DYE: CPT | Mod: TC,PO

## 2017-09-07 DIAGNOSIS — M50.30 DDD (DEGENERATIVE DISC DISEASE), CERVICAL: Primary | ICD-10-CM

## 2017-09-12 ENCOUNTER — SURGERY (OUTPATIENT)
Age: 62
End: 2017-09-12

## 2017-09-12 ENCOUNTER — HOSPITAL ENCOUNTER (OUTPATIENT)
Facility: HOSPITAL | Age: 62
Discharge: HOME | End: 2017-09-12
Attending: ANESTHESIOLOGY | Admitting: ANESTHESIOLOGY
Payer: MEDICARE

## 2017-09-12 ENCOUNTER — HOSPITAL ENCOUNTER (OUTPATIENT)
Dept: RADIOLOGY | Facility: HOSPITAL | Age: 62
Discharge: HOME | End: 2017-09-12
Attending: ANESTHESIOLOGY | Admitting: ANESTHESIOLOGY
Payer: MEDICARE

## 2017-09-12 DIAGNOSIS — M51.36 DDD (DEGENERATIVE DISC DISEASE), LUMBAR: ICD-10-CM

## 2017-09-12 DIAGNOSIS — M54.12 CERVICAL RADICULOPATHY: Primary | ICD-10-CM

## 2017-09-12 DIAGNOSIS — M50.30 DDD (DEGENERATIVE DISC DISEASE), CERVICAL: ICD-10-CM

## 2017-09-12 PROCEDURE — A4216 STERILE WATER/SALINE, 10 ML: HCPCS | Mod: PO | Performed by: ANESTHESIOLOGY

## 2017-09-12 PROCEDURE — 63600175 PHARM REV CODE 636 W HCPCS: Mod: PO | Performed by: ANESTHESIOLOGY

## 2017-09-12 PROCEDURE — 25500020 PHARM REV CODE 255: Mod: PO | Performed by: ANESTHESIOLOGY

## 2017-09-12 PROCEDURE — 76000 FLUOROSCOPY <1 HR PHYS/QHP: CPT | Mod: TC,PO

## 2017-09-12 PROCEDURE — 62321 NJX INTERLAMINAR CRV/THRC: CPT | Mod: ,,, | Performed by: ANESTHESIOLOGY

## 2017-09-12 PROCEDURE — 99152 MOD SED SAME PHYS/QHP 5/>YRS: CPT | Mod: ,,, | Performed by: ANESTHESIOLOGY

## 2017-09-12 PROCEDURE — 25000003 PHARM REV CODE 250: Mod: PO | Performed by: ANESTHESIOLOGY

## 2017-09-12 PROCEDURE — 62321 NJX INTERLAMINAR CRV/THRC: CPT | Mod: PO | Performed by: ANESTHESIOLOGY

## 2017-09-12 RX ORDER — SODIUM CHLORIDE, SODIUM LACTATE, POTASSIUM CHLORIDE, CALCIUM CHLORIDE 600; 310; 30; 20 MG/100ML; MG/100ML; MG/100ML; MG/100ML
INJECTION, SOLUTION INTRAVENOUS CONTINUOUS
Status: DISCONTINUED | OUTPATIENT
Start: 2017-09-12 | End: 2017-09-12 | Stop reason: HOSPADM

## 2017-09-12 RX ORDER — LIDOCAINE HYDROCHLORIDE 10 MG/ML
INJECTION, SOLUTION EPIDURAL; INFILTRATION; INTRACAUDAL; PERINEURAL
Status: DISCONTINUED | OUTPATIENT
Start: 2017-09-12 | End: 2017-09-12 | Stop reason: HOSPADM

## 2017-09-12 RX ORDER — METHYLPREDNISOLONE ACETATE 80 MG/ML
INJECTION, SUSPENSION INTRA-ARTICULAR; INTRALESIONAL; INTRAMUSCULAR; SOFT TISSUE
Status: DISCONTINUED | OUTPATIENT
Start: 2017-09-12 | End: 2017-09-12 | Stop reason: HOSPADM

## 2017-09-12 RX ORDER — SODIUM CHLORIDE 9 MG/ML
INJECTION, SOLUTION INTRAMUSCULAR; INTRAVENOUS; SUBCUTANEOUS
Status: DISCONTINUED | OUTPATIENT
Start: 2017-09-12 | End: 2017-09-12 | Stop reason: HOSPADM

## 2017-09-12 RX ORDER — MIDAZOLAM HYDROCHLORIDE 5 MG/ML
4 INJECTION INTRAMUSCULAR; INTRAVENOUS ONCE
Status: COMPLETED | OUTPATIENT
Start: 2017-09-12 | End: 2017-09-12

## 2017-09-12 RX ADMIN — IOHEXOL 3 ML: 300 INJECTION, SOLUTION INTRAVENOUS at 03:09

## 2017-09-12 RX ADMIN — SODIUM CHLORIDE 4 ML: 9 INJECTION INTRAMUSCULAR; INTRAVENOUS; SUBCUTANEOUS at 03:09

## 2017-09-12 RX ADMIN — SODIUM CHLORIDE, SODIUM LACTATE, POTASSIUM CHLORIDE, AND CALCIUM CHLORIDE: .6; .31; .03; .02 INJECTION, SOLUTION INTRAVENOUS at 02:09

## 2017-09-12 RX ADMIN — MIDAZOLAM HYDROCHLORIDE 4 MG: 5 INJECTION, SOLUTION INTRAMUSCULAR; INTRAVENOUS at 03:09

## 2017-09-12 RX ADMIN — METHYLPREDNISOLONE ACETATE 80 MG: 80 INJECTION, SUSPENSION INTRA-ARTICULAR; INTRALESIONAL; INTRAMUSCULAR; SOFT TISSUE at 03:09

## 2017-09-12 RX ADMIN — LIDOCAINE HYDROCHLORIDE 10 ML: 10 INJECTION, SOLUTION EPIDURAL; INFILTRATION; INTRACAUDAL; PERINEURAL at 03:09

## 2017-09-12 NOTE — H&P (VIEW-ONLY)
This note was completed with dictation software and grammatical errors may exist.    CC: Neck pain, back pain    HPI: The patient is a 61-year-old man with a history of hypertension, chronic neck and back pain who presents in referral from [facility] for continued neck and back pain.  He is status post C7-T1 cervical interlaminar epidural steroid on 7/24/17 with 25% relief.  He complains of neck pain greater than back pain.  He describes his neck pain as sharp, radiating to his left shoulder and down his left arm.  This is worse with using his arm and turning his head.  He also complains of bilateral low back pain radiating to the bilateral buttocks.  He describes this as grinding, squeezing, worse with standing and improved with leaning forward.  He reports tingling in his toes and intermittent weakness that can be in any extremity.  He denies bladder or bowel incontinence.    Pain intervention history: [facility] tried gabapentin, Robaxin, capsaicin cream for his neck.  He has had lumbar epidural steroid injections but nothing for his neck.  He is status post C7-T1 cervical interlaminar epidural steroid injection on 8/22/16 with what sounds like moderate relief.  He is status post C7-T1 cervical interlaminar epidural steroid on 7/24/17 with 25% relief.  He is not interested in doing any physical therapy.    ROS: He reports back pain, neck pain.  Balance of review of systems is negative.    Medical, surgical, family and social history reviewed elsewhere in record.    Medications/Allergies: See med card    Vitals:    08/21/17 1337   BP: 120/66   Pulse: 70   Resp: 18   Temp: 98.6 °F (37 °C)   TempSrc: Oral   Weight: 93.5 kg (206 lb 1.6 oz)   PainSc:   4   PainLoc: Neck         Physical exam:  Gen: A and O x3, pleasant, well-groomed  Skin: No rashes or obvious lesions  HEENT: PERRLA, no obvious deformities on ears or in canals.Trachea midline.  CVS: Regular rate and  rhythm, normal palpable pulses.  Resp: Clear to auscultation bilaterally, no wheezes or rales.  Abdomen: Soft, NT/ND.  Musculoskeletal:  No antalgic gait.     Neuro:  Upper extremities: 5/5 strength bilaterally   Reflexes: Brachioradialis 2+, Bicep 2+, Tricep 2+.   Sensory: Intact and symmetrical to light touch and pinprick in C2-T1 dermatomes bilaterally.    Cervical Spine:  Cervical spine: Range of motion is full with flexion, moderately reduced with extension and lateral rotation especially with lateral rotation to the left causing increased neck pain.  Spurling's maneuver causes neck pain to either side, left greater than right.  Myofascial exam: Mild Tenderness to palpation across cervical paraspinous region bilaterally.    Imaging:  MRI cervical spine 5/16/16: C1/2 normal.  C2/3 mild facet arthropathy.  At C3/4 mild facet arthropathy.  C4/5 mild small broad-based posterior disc bulge and facet arthrosis resulting in ventral CSF space effacement and mild ventral cord flattening.  At C5/6 there is broad-based disc bulge with focal left paracentral disc protrusion bilateral facet arthrosis and left uncovertebral spurring resulting in severe left neural foraminal narrowing, ventral CSF space effacement and mild ventral cord flattening.  At C6/7 there is broad-based posterior disc bulge and osteophytes bilateral uncovertebral spurring and facet arthropathy resulting in partial ventral CSF space effacement, mild ventral cord flattening and severe bilateral foraminal stenosis.  At C7/T1 there is broad-based posterior disc bulge with bilateral facet arthrosis and left uncovertebral spurring resulting in partial CSF space effacement and ventral cord flattening and severe left neural foraminal stenosis.    CT lumbar spine report 7/15/11  L2-3 mild disc bulge  L3-4 mild bulging of the disc with flattening of the ventral thecal sac resulting in mild narrowing of the foramina  L4-5 mild bulging of the disc is present along  with ligamentum flavum hypertrophy combine to result in a moderate degree of canal stenosis with mild foraminal narrowing      Assessment:  The patient is a 61-year-old man with a history of hypertension, chronic neck and back pain who presents in referral from [facility] for continued neck and back pain.   1. Cervical radiculopathy  Vital signs    Activity as tolerated    Place 18-22 gauage peripheral IV     Verify informed consent    Notify physician     Notify physician     Notify physician (specify)    Diet NPO    Case Request Operating Room: INJECTION-STEROID-EPIDURAL-CERVICAL    Place in Outpatient    lactated ringers infusion    midazolam (PF) injection 4 mg   2. Cervical stenosis of spine     3. DDD (degenerative disc disease), lumbar  MRI Lumbar Spine Without Contrast   4. Lumbar stenosis           Plan:  1.  The patient had mild relief following the cervical SOLIS and I will set him up to repeat this to see if he can get closer to full relief.  2.  We discussed his lumbar spine CT results from 2011 and he had moderate canal stenosis at L4-5 at that time.  I have ordered a new lumbar spine MRI to evaluate for progression and we discussed possibly trying an L5/S1 interlaminar SOLIS for his back pain in the future.  3.  Follow-up in 4 weeks post procedure sooner as needed.    Greater than 50% of this 30 minute visit was spent on counseling the patient.

## 2017-09-12 NOTE — OP NOTE
PROCEDURE DATE: 9/12/2017    Procedure: C7-T1 cervical interlaminar epidural steroid injection under utilizing fluoroscopy.    Diagnosis: Cervical Radiculopathy    POSTOP DIAGNOSIS: SAME    Physician: Phil Ann MD    Medications injected:  Methylprednisone 80mg followed by a slow injection of 4 mL sterile, preservative-free normal saline.    Local anesthetic used: Lidocaine 1%, 4 ml.    Sedation Medications: 4mg versed    Complications:  none    Estimated blood loss: none    Technique:  A time-out was taken to identify patient and procedure prior to starting the procedure.  With the patient laying in a prone position with the neck in a mid-flexed forward position, the area was prepped and draped in the usual sterile fashion using ChloraPrep and a fenestrated drape.  The area was determined under AP fluoroscopic guidance.  Local anesthetic was given using a 25-gauge 1.5 inch needle by raising a wheal and then infiltrating ventrally.  A 3.5 inch 20-gauge Touhy needle was introduced under fluoroscopic guidance to meet the lamina of C7.  The needle was then hinged under the lamina then advanced using loss of resistance technique.  Once the tip of the needle was in the desired position, the contrast dye Omnipaque was injected to determine placement and no uptake.  The steroid was then injected slowly followed by a slow injection of 4 mL of the sterile preservative-free normal saline.  The patient tolerated the procedure well.    The patient was monitored after the procedure and was given post-procedure and discharge instructions to follow at home. The patient was discharged in a stable condition.

## 2017-09-12 NOTE — PLAN OF CARE
PT TO PACU S/P CERVICAL SOLIS WITH IV SEDATION.  MONITORING PER [facility] DETAILS FURTHER.

## 2017-09-12 NOTE — DISCHARGE INSTRUCTIONS
Home care instructions  Apply ice pack to the injection site for 20 minutes periods for the first 24 hrs for soreness/discomfort at injection site DO NOT USE HEAT FOR 24 HOURS  Keep site clean and dry for 24 hours, remove bandaid when desired  Do not drive until tomorrow  Take care when walking after a lumbar injection  Avoid strenuous activities for 2 days  Make take 2 weeks to feel the full effects   Resume home medication as prescribed today  Resume Aspirin, Plavix, or Coumadin the day after the procedure unless otherwise instructed.    SEE IMMEDIATE MEDICAL HELP FOR:  Severe increase in your usual pain or appearance of new pain  Prolonged or increasing weakness or numbness in the legs or arms  Drainage, redness, active bleeding, or increased swelling at the injection site  Temperature over 100.0 degrees F.  Headache that increases when your head is upright and decreases when you lie flat    CALL 911 OR GO DIRECTLY TO EMERGENCY DEPARTMENT FOR:  Shortness of breath, chest pain, or problems breathing  Recovery After Procedural Sedation (Adult)  You have been given medicine by vein to make you sleep during your surgery. This may have included both a pain medicine and sleeping medicine. Most of the effects have worn off. But you may still have some drowsiness for the next 6 to 8 hours.  Home care  Follow these guidelines when you get home:  · For the next 8 hours, you should be watched by a responsible adult. This person should make sure your condition is not getting worse.  · Don't drink any alcohol for the next 24 hours.  · Don't drive, operate dangerous machinery, or make important business or personal decisions during the next 24 hours.  Note: Your healthcare provider may tell you not to take any medicine by mouth for pain or sleep in the next 4 hours. These medicines may react with the medicines you were given in the hospital. This could cause a much stronger response than usual.  Follow-up care  Follow up with  your healthcare provider if you are not alert and back to your usual level of activity within 12 hours.  When to seek medical advice  Call your healthcare provider right away if any of these occur:  · Drowsiness gets worse  · Weakness or dizziness gets worse  · Repeated vomiting  · You can't be awakened   Date Last Reviewed: 10/18/2016  © 2443-4120 [employer]. [address] [place] [address], [place] [address]. All rights reserved. This information is not intended as a substitute for professional medical care. Always follow your healthcare professional's instructions.

## 2017-09-12 NOTE — DISCHARGE SUMMARY
[facility]  Discharge Note  Short Stay    Admit Date: 9/12/2017    Discharge Date: 9/12/2017    Attending Physician: Phil Ann MD     Discharge Provider: Phil Ann    Diagnoses:  Active Hospital Problems    Diagnosis  POA    *Cervical radiculopathy [M54.12]  Yes      Resolved Hospital Problems    Diagnosis Date Resolved POA   No resolved problems to display.       Discharged Condition: good    Final Diagnoses: Cervical radiculopathy [M54.12]    Disposition: Home or Self Care    Hospital Course: no complications, uneventful    Outcome of Hospitalization, Treatment, Procedure, or Surgery:  Patient was admitted for outpatient procedure. The patient underwent procedure without complications and are discharged home    Follow up/Patient Instructions:  Follow up as scheduled/Patient has received instructions and follow up date    Medications:  Continue previous medications      Discharge Procedure Orders  Diet general     Activity as tolerated     Call MD for:  temperature >100.4     Call MD for:  severe uncontrolled pain     Call MD for:  redness, tenderness, or signs of infection (pain, swelling, redness, odor or green/yellow discharge around incision site)     Call MD for:  severe persistent headache     No dressing needed           Discharge Procedure Orders (must include Diet, Follow-up, Activity):    Discharge Procedure Orders (must include Diet, Follow-up, Activity)  Diet general     Activity as tolerated     Call MD for:  temperature >100.4     Call MD for:  severe uncontrolled pain     Call MD for:  redness, tenderness, or signs of infection (pain, swelling, redness, odor or green/yellow discharge around incision site)     Call MD for:  severe persistent headache     No dressing needed

## 2017-09-13 VITALS
TEMPERATURE: 98 F | RESPIRATION RATE: 17 BRPM | DIASTOLIC BLOOD PRESSURE: 79 MMHG | HEART RATE: 65 BPM | SYSTOLIC BLOOD PRESSURE: 133 MMHG | OXYGEN SATURATION: 97 %

## 2017-09-21 ENCOUNTER — TELEPHONE (OUTPATIENT)
Dept: PAIN MEDICINE | Facility: CLINIC | Age: 62
End: 2017-09-21

## 2017-09-21 DIAGNOSIS — G89.29 CHRONIC PAIN OF LEFT KNEE: Primary | ICD-10-CM

## 2017-09-21 DIAGNOSIS — M25.562 CHRONIC PAIN OF LEFT KNEE: Primary | ICD-10-CM

## 2017-09-21 NOTE — TELEPHONE ENCOUNTER
Spoke with the patient regarding a referral for ortho for left knee torn meniscus. Patient has MRI. Please advise.

## 2017-09-21 NOTE — TELEPHONE ENCOUNTER
----- Message from Lela Arenas sent at 9/21/2017 12:59 PM CDT -----  Contact: self  Pa;ainsley called regarding a referral to an orthopedic. Please contact [phone] ([email or web address])

## 2017-09-22 ENCOUNTER — OFFICE VISIT (OUTPATIENT)
Dept: ORTHOPEDICS | Facility: CLINIC | Age: 62
End: 2017-09-22
Payer: MEDICARE

## 2017-09-22 ENCOUNTER — HOSPITAL ENCOUNTER (OUTPATIENT)
Dept: RADIOLOGY | Facility: HOSPITAL | Age: 62
Discharge: HOME | End: 2017-09-22
Attending: ORTHOPAEDIC SURGERY
Payer: MEDICARE

## 2017-09-22 VITALS — WEIGHT: 206 LBS | BODY MASS INDEX: 27.9 KG/M2 | HEIGHT: 72 IN

## 2017-09-22 DIAGNOSIS — M25.562 ACUTE PAIN OF LEFT KNEE: Primary | ICD-10-CM

## 2017-09-22 DIAGNOSIS — M25.562 LEFT KNEE PAIN, UNSPECIFIED CHRONICITY: Primary | ICD-10-CM

## 2017-09-22 DIAGNOSIS — M25.562 ACUTE PAIN OF LEFT KNEE: ICD-10-CM

## 2017-09-22 PROCEDURE — 97760 ORTHOTIC MGMT&TRAING 1ST ENC: CPT | Mod: PO,PBBFAC | Performed by: ORTHOPAEDIC SURGERY

## 2017-09-22 PROCEDURE — 99203 OFFICE O/P NEW LOW 30 MIN: CPT | Mod: 25,S$PBB,, | Performed by: ORTHOPAEDIC SURGERY

## 2017-09-22 PROCEDURE — 99212 OFFICE O/P EST SF 10 MIN: CPT | Mod: PBBFAC,PO | Performed by: ORTHOPAEDIC SURGERY

## 2017-09-22 PROCEDURE — 99999 PR PBB SHADOW E&M-EST. PATIENT-LVL II: CPT | Mod: PBBFAC,,, | Performed by: ORTHOPAEDIC SURGERY

## 2017-09-22 NOTE — PROGRESS NOTES
HISTORY OF PRESENT ILLNESS:  Migue Sellers, 62 years old, complaining of pain in   his left knee for about three weeks' time just walking and felt pain in the   knee.  Rates pain is up to 7/10 on the pain scale, aching type pain, but wearing   knee immobilizer, still uncomfortable.    PHYSICAL EXAMINATION:  Exam today shows that he has no signs of infection about   the knee.  No instability.  She is tender throughout the joint line.    X-rays shows some signal changes in the meniscus as well as some signal changes   in the ACL.  Official report is still pending.    ASSESSMENT:  Knee pain, synovitis, chondrosis meniscal tear.    PLAN:  We will switch him to Bio Skin knee sleeve.  Continue with the   symptomatic care.  If he does not improve, we will consider arthroscopic   intervention.      JOSÉ/SARKIS  dd: 09/22/2017 14:47:55 (CDT)  td: 09/23/2017 02:59:19 (CDT)  Doc ID   #[id number]  Job ID #[id number]    CC:     Further History  Aching pain  Worse with activity  Relieved with rest  No other associated symptoms  No other radiation    Further Exam  Alert and oriented  Pleasant  Contralateral limb has appropriate range of motion for age and condition  Contralateral limb has appropriate strength for age and condition  Contralateral limb has appropriate stability  for age and condition  No adenopathy  Pulses are appropriate for current condition  Skin is intact        Chief Complaint    Chief Complaint   Patient presents with    Left Knee - Pain       HPI  Migue Sellers is a 62 y.o.  male who presents with       Past Medical History  Past Medical History:   Diagnosis Date    Anticoagulant long-term use     aspirn    Asthma     Cervical stenosis of spine     DDD (degenerative disc disease), lumbar     Depression     DJD (degenerative joint disease)     Hyperlipidemia     Hypertension     Intervertebral disc protrusion     Skin abnormalities        Past Surgical History  Past Surgical History:   Procedure Laterality Date     COLONOSCOPY      MULTIPLE TOOTH EXTRACTIONS         Medications  Current Outpatient Prescriptions   Medication Sig    albuterol 90 mcg/actuation inhaler Inhale 2 puffs into the lungs every 6 (six) hours as needed for Wheezing.    amlodipine (NORVASC) 10 MG tablet Take 1 tablet (10 mg total) by mouth once daily.    aspirin 81 MG Chew Take 1 tablet (81 mg total) by mouth once daily.    atenolol (TENORMIN) 25 MG tablet Take 0.5 tablets (12.5 mg total) by mouth once daily.    diltiaZEM (DILACOR XR) 180 MG CDCR Take 1 capsule (180 mg total) by mouth 2 (two) times daily.    fluoxetine (PROZAC) 20 MG capsule Take 1 capsule (20 mg total) by mouth once daily.    gabapentin (NEURONTIN) 300 MG capsule Take 2 capsules (600 mg total) by mouth 3 (three) times daily.    hydrochlorothiazide (HYDRODIURIL) 12.5 MG Tab Take 1 tablet (12.5 mg total) by mouth once daily.    hydrocodone-acetaminophen 10-325mg (NORCO)  mg Tab Take 1 tablet by mouth 4 (four) times daily.    ibuprofen (ADVIL,MOTRIN) 800 MG tablet Take 1 tablet (800 mg total) by mouth 4 (four) times daily.    ketoconazole (NIZORAL) 2 % shampoo Apply topically 3 (three) times a week.    loratadine (CLARITIN) 10 mg tablet Take 1 tablet (10 mg total) by mouth once daily.    LUBRICANT EYE, POLYV ALCOHOL, 1.4 % ophthalmic solution     meloxicam (MOBIC) 15 MG tablet Take 0.5 tablets (7.5 mg total) by mouth once daily.    potassium chloride (KLOR-CON) 10 MEQ TbSR Take 2 tablets (20 mEq total) by mouth 2 (two) times daily.    simvastatin (ZOCOR) 20 MG tablet Take 1 tablet (20 mg total) by mouth every evening.    SYMBICORT 80-4.5 mcg/actuation HFAA     terbinafine HCl (LAMISIL) 1 % cream Apply topically 2 (two) times daily.    travoprost, benzalkonium, (TRAVATAN) 0.004 % ophthalmic solution Place 1 drop into both eyes nightly.    triamcinolone acetonide 0.1% (KENALOG) 0.1 % Lotn Apply topically 2 (two) times daily.    white petrolatum-mineral oil  (EUCERIN) Crea Apply topically as needed.     No current facility-administered medications for this visit.        Allergies  Review of patient's allergies indicates:   Allergen Reactions    Ace inhibitors Swelling       Family History  History reviewed. No pertinent family history.    Social History  Social History     Social History    Marital status:      Spouse name: N/A    Number of children: 3    Years of education: N/A     Occupational History    Not on file.     Social History Main Topics    Smoking status: Former Smoker     Packs/day: 0.25     Quit date: 5/12/2015    Smokeless tobacco: Never Used      Comment: smokes once per month    Alcohol use No    Drug use: No    Sexual activity: Not on file     Other Topics Concern    Not on file     Social History Narrative    No narrative on file               Review of Systems     Constitutional: Negative    HENT: Negative  Eyes: Negative  Respiratory: Negative  Cardiovascular: Negative  Musculoskeletal: HPI  Skin: Negative  Neurological: Negative  Hematological: Negative  Endocrine: Negative                 Physical Exam    There were no vitals filed for this visit.  Body mass index is 27.94 kg/m².  Physical Examination:     General appearance -  well appearing, and in no distress  Mental status - awake  Neck - supple  Chest -  symmetric air entry  Heart - normal rate   Abdomen - soft      Assessment     1. Left knee pain, unspecified chronicity          PlanWe performed a custom orthotic/brace fitting, adjusting and training with the patient. The patient demonstrated understanding and proper care. This was performed for 15 minutes.

## 2017-09-22 NOTE — LETTER
September 22, 2017      Phil Ann MD  [address] [facility] [address] [place] [address]           [facility]  [address] [facility] [address] [place] [address]  Phone: [phone]          Patient: Migue Sellers   MR Number: [id number]   YOB: 1955   Date of Visit: 9/22/2017       Dear Dr. Phil Ann:    Thank you for referring Migue Sellers to me for evaluation. Attached you will find relevant portions of my assessment and plan of care.    If you have questions, please do not hesitate to call me. I look forward to following Migue Sellers along with you.    Sincerely,    Shaka Mays MD    Enclosure  CC:  No Recipients    If you would like to receive this communication electronically, please contact [email or web address] or [phone] to request more information on [employer] Link access.    For providers and/or their staff who would like to refer a patient to [facility], please contact us through our one-stop-shop provider referral line, Micheal Chowdhury, at [phone].    If you feel you have received this communication in error or would no longer like to receive these types of communications, please e-mail [email or web address]

## 2017-10-12 ENCOUNTER — OFFICE VISIT (OUTPATIENT)
Dept: PAIN MEDICINE | Facility: CLINIC | Age: 62
End: 2017-10-12
Payer: MEDICARE

## 2017-10-12 VITALS
BODY MASS INDEX: 29.66 KG/M2 | SYSTOLIC BLOOD PRESSURE: 120 MMHG | DIASTOLIC BLOOD PRESSURE: 70 MMHG | RESPIRATION RATE: 20 BRPM | HEART RATE: 78 BPM | WEIGHT: 218.69 LBS | TEMPERATURE: 98 F

## 2017-10-12 DIAGNOSIS — M54.12 CERVICAL RADICULOPATHY: ICD-10-CM

## 2017-10-12 DIAGNOSIS — M48.02 CERVICAL STENOSIS OF SPINE: ICD-10-CM

## 2017-10-12 DIAGNOSIS — M54.16 LUMBAR RADICULOPATHY: Primary | ICD-10-CM

## 2017-10-12 DIAGNOSIS — G89.29 CHRONIC PAIN OF LEFT KNEE: ICD-10-CM

## 2017-10-12 DIAGNOSIS — M25.562 CHRONIC PAIN OF LEFT KNEE: ICD-10-CM

## 2017-10-12 DIAGNOSIS — M48.062 SPINAL STENOSIS OF LUMBAR REGION WITH NEUROGENIC CLAUDICATION: ICD-10-CM

## 2017-10-12 PROCEDURE — 99214 OFFICE O/P EST MOD 30 MIN: CPT | Mod: S$PBB,,, | Performed by: PHYSICIAN ASSISTANT

## 2017-10-12 PROCEDURE — 99215 OFFICE O/P EST HI 40 MIN: CPT | Mod: PBBFAC,PO | Performed by: PHYSICIAN ASSISTANT

## 2017-10-12 PROCEDURE — 99999 PR PBB SHADOW E&M-EST. PATIENT-LVL V: CPT | Mod: PBBFAC,,, | Performed by: PHYSICIAN ASSISTANT

## 2017-10-12 RX ORDER — ALPRAZOLAM 0.5 MG/1
1 TABLET, ORALLY DISINTEGRATING ORAL ONCE AS NEEDED
Status: CANCELLED | OUTPATIENT
Start: 2017-10-31 | End: 2017-10-31

## 2017-10-13 NOTE — PROGRESS NOTES
This note was completed with dictation software and grammatical errors may exist.    CC: Neck pain, back pain    HPI: The patient is a 62-year-old man with a history of hypertension, chronic neck and back pain who presents in referral from [facility] for continued neck and back pain.  He is status post C7-T1 cervical interlaminar epidural steroid injection on 9/12/17 with what sounds like excellent relief.  He feels that his remaining neck pain is now tolerable but complains of severe bilateral low back pain radiating to the buttocks and posterior thighs.  This is worse with standing and walking, improved with sitting.  He also complains of left knee pain and states that he may need a surgery in the future.  He complains of numbness and tingling in his left foot.  He denies bladder or bowel incontinence.  He reports weakness in his left leg due to his knee.    [facility]: [facility] gab[facility], Robaxin, capsaicin cream for his neck.  He has had lumbar epidural steroid injections but nothing for his neck.  He is status post C7-T1 cervical interlaminar epidural steroid injection on 8/22/16 with what sounds like moderate relief.  He is status post C7-T1 cervical interlaminar epidural steroid on 7/24/17 with 25% relief.  He is not interested in doing any physical therapy.  He is status post C7-T1 cervical interlaminar epidural steroid injection on 9/12/17 with what sounds like excellent relief.     ROS: He reports back pain, neck pain.  Balance of review of systems is negative.    Medical, surgical, family and social history reviewed elsewhere in record.    Medications/Allergies: See med card    Vitals:    10/12/17 1306   BP: 120/70   Pulse: 78   Resp: 20   Temp: 98.1 °F (36.7 °C)   TempSrc: Oral   Weight: 99.2 kg (218 lb 11.1 oz)   PainSc:   6   PainLoc: Knee         Physical exam:  Gen: A and O x3, pleasant, well-groomed  Skin: No rashes or obvious  lesions  HEENT: PERRLA, no obvious deformities on ears or in canals.Trachea midline.  CVS: Regular rate and rhythm, normal palpable pulses.  Resp: Clear to auscultation bilaterally, no wheezes or rales.  Abdomen: Soft, NT/ND.  Musculoskeletal:  Antalgic gait due to left knee pain.  Wearing a knee brace.    Neuro:  Lower extremities: 5/5 strength bilaterally  Reflexes: Patellar 2+, Achilles 2+ bilaterally.  Sensory:  Intact and symmetrical to light touch and pinprick in L2-S1 dermatomes bilaterally.    Lumbar spine:  Lumbar spine: ROM is mildly limited with flexion moderately limited with extension and oblique extension with increased low back pain during each maneuver but especially with extension.  Juma's test causes no increased pain on either side.    Supine straight leg raise causes posterior thigh pain bilaterally.  Internal and external rotation of the hip causes no increased pain on either side.  Myofascial exam: No tenderness to palpation across lumbar paraspinous muscles.      Imaging:  MRI cervical spine 5/16/16: C1/2 normal.  C2/3 mild facet arthropathy.  At C3/4 mild facet arthropathy.  C4/5 mild small broad-based posterior disc bulge and facet arthrosis resulting in ventral CSF space effacement and mild ventral cord flattening.  At C5/6 there is broad-based disc bulge with focal left paracentral disc protrusion bilateral facet arthrosis and left uncovertebral spurring resulting in severe left neural foraminal narrowing, ventral CSF space effacement and mild ventral cord flattening.  At C6/7 there is broad-based posterior disc bulge and osteophytes bilateral uncovertebral spurring and facet arthropathy resulting in partial ventral CSF space effacement, mild ventral cord flattening and severe bilateral foraminal stenosis.  At C7/T1 there is broad-based posterior disc bulge with bilateral facet arthrosis and left uncovertebral spurring resulting in partial CSF space effacement and ventral cord  flattening and severe left neural foraminal stenosis.    CT lumbar spine report 7/15/11  L2-3 mild disc bulge  L3-4 mild bulging of the disc with flattening of the ventral thecal sac resulting in mild narrowing of the foramina  L4-5 mild bulging of the disc is present along with ligamentum flavum hypertrophy combine to result in a moderate degree of canal stenosis with mild foraminal narrowing    MRI lumbar spine 8/25/17  At the T12-L1 normal, mild ligamentous hypertrophy is noted, no significant central canal stenosis or neural foraminal stenosis is noted.  At the L1-L2 level, mild ligamentous hypertrophy appears to be present,  no significant disk bulge, central canal stenosis, or neural foraminal stenosis is noted  At L2-L3 level, broad-based bulge or protrusion appears to be present of 4 mm with asymmetric bulging towards each neural foramen. Mild bilateral neural foraminal stenosis is noted. Mild central canal narrowing is noted to 9 mm. Mild bilateral lateral recess narrowing is noted.  At L3-L4 level, asymmetric bulging is noted towards the left neural foramen slightly greater than right of approximately 3 mm, mild left greater than right neural foraminal narrowing is noted without significant central canal stenosis. Mild facet arthropathy and ligamentous hypertrophy is noted.  At the L4-L5 level, broad-based protrusion appears to be present centrally of 6 mm with increased T2 signal suggestive of an annular tear. Ligamentous hypertrophy and facet arthropathy appears to be present. Moderate central canal stenosis appears to be present with bunching of the nerve roots and central canal narrowing to 7 mm. Bilateral lateral recess stenosis is noted. Mild/moderate bilateral neural foraminal narrowing is noted slightly greater to the right than left.  At the L5-S1 level, broad-based bulge is noted centrally of 3 mm with increased T2 signal this can be seen with an annular tear, mild left greater than right neural  foraminal stenosis appears to be present. Mild central canal narrowing is noted.      Assessment:  The patient is a 62-year-old man with a history of hypertension, chronic neck and back pain who presents in referral from [facility] for continued neck and back pain.   1. Lumbar radiculopathy  Vital signs    Activity as tolerated    Verify informed consent    Notify physician     Notify physician     Notify physician (specify)    Diet NPO    Case Request Operating Room: INJECTION-STEROID-EPIDURAL-LUMBAR L5/S1    Place in Outpatient    alprazolam ODT dissolvable tablet 1 mg   2. Cervical radiculopathy     3. Cervical stenosis of spine     4. Spinal stenosis of lumbar region with neurogenic claudication     5. Chronic pain of left knee           Plan:  1.  The patient has sufficient relief of his neck pain following his second cervical SOLIS.  This can be repeated in the future if necessary.  2.  I reviewed his new lumbar spine MRI results with him and we discussed that he has moderate canal stenosis at L4-5 likely causing his pain.  I will schedule him for an L5/S1 interlaminar epidural steroid injection.  3.  He will follow-up with orthopedics for his left knee.  He states that he was told he has a torn meniscus.  4.  Follow-up in 4 weeks post procedure or sooner as needed.

## 2017-10-19 ENCOUNTER — PATIENT OUTREACH (OUTPATIENT)
Dept: ADMINISTRATIVE | Facility: HOSPITAL | Age: 62
End: 2017-10-19

## 2017-10-19 NOTE — LETTER
October 19, 2017    Migue AGGARWAL Marlo  [address] [address]           [facility]  [address]  [facility] [place] [facility] [address]  Phone: [phone] Dear Mr. Sellers:    [facility] is committed to your overall health.  To help you get the most out of each of your visits, we will review your information to make sure you are up to date on all of your recommended tests and/or procedures.      Champ Kramer MD has found that you may be due for   Health Maintenance Due   Topic    Hepatitis C Screening     Lipid Panel     Influenza Vaccine       If you have had any of the above done at another facility, please bring the records or information with you so that your record at [facility] will be complete.    If you are currently taking medication, please bring it with you to your appointment for review.    We will be happy to assist you with scheduling any necessary appointments or you may contact the [facility] appointment desk at [phone] to schedule at your convenience.     Thank you for choosing [facility] for your healthcare needs,    If you have any questions or concerns, please don't hesitate to call.    Sincerely,    CHELSY Lund  Care Coordination Department  [facility]  [phone]

## 2017-10-24 DIAGNOSIS — M51.36 DDD (DEGENERATIVE DISC DISEASE), LUMBAR: Primary | ICD-10-CM

## 2017-10-31 ENCOUNTER — HOSPITAL ENCOUNTER (OUTPATIENT)
Dept: RADIOLOGY | Facility: HOSPITAL | Age: 62
Discharge: HOME | End: 2017-10-31
Attending: ANESTHESIOLOGY
Payer: MEDICARE

## 2017-10-31 ENCOUNTER — SURGERY (OUTPATIENT)
Age: 62
End: 2017-10-31

## 2017-10-31 ENCOUNTER — HOSPITAL ENCOUNTER (OUTPATIENT)
Facility: HOSPITAL | Age: 62
Discharge: HOME | End: 2017-10-31
Attending: ANESTHESIOLOGY | Admitting: ANESTHESIOLOGY
Payer: MEDICARE

## 2017-10-31 VITALS
OXYGEN SATURATION: 100 % | WEIGHT: 212 LBS | HEIGHT: 72 IN | SYSTOLIC BLOOD PRESSURE: 130 MMHG | DIASTOLIC BLOOD PRESSURE: 77 MMHG | RESPIRATION RATE: 18 BRPM | TEMPERATURE: 98 F | HEART RATE: 68 BPM | BODY MASS INDEX: 28.71 KG/M2

## 2017-10-31 DIAGNOSIS — M51.36 DDD (DEGENERATIVE DISC DISEASE), LUMBAR: ICD-10-CM

## 2017-10-31 DIAGNOSIS — M54.16 LUMBAR RADICULOPATHY: Primary | ICD-10-CM

## 2017-10-31 PROCEDURE — 25500020 PHARM REV CODE 255: Mod: PO | Performed by: ANESTHESIOLOGY

## 2017-10-31 PROCEDURE — 62323 NJX INTERLAMINAR LMBR/SAC: CPT | Mod: PO | Performed by: ANESTHESIOLOGY

## 2017-10-31 PROCEDURE — A4216 STERILE WATER/SALINE, 10 ML: HCPCS | Mod: PO | Performed by: ANESTHESIOLOGY

## 2017-10-31 PROCEDURE — 25000003 PHARM REV CODE 250: Mod: PO | Performed by: ANESTHESIOLOGY

## 2017-10-31 PROCEDURE — 63600175 PHARM REV CODE 636 W HCPCS: Mod: PO | Performed by: ANESTHESIOLOGY

## 2017-10-31 PROCEDURE — 76000 FLUOROSCOPY <1 HR PHYS/QHP: CPT | Mod: TC,PO

## 2017-10-31 PROCEDURE — 62323 NJX INTERLAMINAR LMBR/SAC: CPT | Mod: ,,, | Performed by: ANESTHESIOLOGY

## 2017-10-31 RX ORDER — SODIUM CHLORIDE 9 MG/ML
INJECTION, SOLUTION INTRAMUSCULAR; INTRAVENOUS; SUBCUTANEOUS
Status: DISCONTINUED | OUTPATIENT
Start: 2017-10-31 | End: 2017-10-31 | Stop reason: HOSPADM

## 2017-10-31 RX ORDER — LIDOCAINE HYDROCHLORIDE 10 MG/ML
INJECTION, SOLUTION EPIDURAL; INFILTRATION; INTRACAUDAL; PERINEURAL
Status: DISCONTINUED | OUTPATIENT
Start: 2017-10-31 | End: 2017-10-31 | Stop reason: HOSPADM

## 2017-10-31 RX ORDER — ALPRAZOLAM 0.5 MG/1
1 TABLET, ORALLY DISINTEGRATING ORAL ONCE AS NEEDED
Status: COMPLETED | OUTPATIENT
Start: 2017-10-31 | End: 2017-10-31

## 2017-10-31 RX ORDER — METHYLPREDNISOLONE ACETATE 80 MG/ML
INJECTION, SUSPENSION INTRA-ARTICULAR; INTRALESIONAL; INTRAMUSCULAR; SOFT TISSUE
Status: DISCONTINUED | OUTPATIENT
Start: 2017-10-31 | End: 2017-10-31 | Stop reason: HOSPADM

## 2017-10-31 RX ADMIN — SODIUM CHLORIDE 4 ML: 9 INJECTION INTRAMUSCULAR; INTRAVENOUS; SUBCUTANEOUS at 02:10

## 2017-10-31 RX ADMIN — LIDOCAINE HYDROCHLORIDE 10 ML: 10 INJECTION, SOLUTION EPIDURAL; INFILTRATION; INTRACAUDAL; PERINEURAL at 02:10

## 2017-10-31 RX ADMIN — METHYLPREDNISOLONE ACETATE 80 MG: 80 INJECTION, SUSPENSION INTRA-ARTICULAR; INTRALESIONAL; INTRAMUSCULAR; SOFT TISSUE at 02:10

## 2017-10-31 RX ADMIN — ALPRAZOLAM 1 MG: 0.5 TABLET, ORALLY DISINTEGRATING ORAL at 02:10

## 2017-10-31 RX ADMIN — IOHEXOL 3 ML: 300 INJECTION, SOLUTION INTRAVENOUS at 02:10

## 2017-10-31 NOTE — H&P (VIEW-ONLY)
This note was completed with dictation software and grammatical errors may exist.    CC: Neck pain, back pain    HPI: The patient is a 62-year-old man with a history of hypertension, chronic neck and back pain who presents in referral from [facility] for continued neck and back pain.  He is status post C7-T1 cervical interlaminar epidural steroid injection on 9/12/17 with what sounds like excellent relief.  He feels that his remaining neck pain is now tolerable but complains of severe bilateral low back pain radiating to the buttocks and posterior thighs.  This is worse with standing and walking, improved with sitting.  He also complains of left knee pain and states that he may need a surgery in the future.  He complains of numbness and tingling in his left foot.  He denies bladder or bowel incontinence.  He reports weakness in his left leg due to his knee.    [facility] history: [facility] gabapentin, Robaxin, capsaicin cream for his neck.  He has had lumbar epidural steroid injections but nothing for his neck.  He is status post C7-T1 cervical interlaminar epidural steroid injection on 8/22/16 with what sounds like moderate relief.  He is status post C7-T1 cervical interlaminar epidural steroid on 7/24/17 with 25% relief.  He is not interested in doing any physical therapy.  He is status post C7-T1 cervical interlaminar epidural steroid injection on 9/12/17 with what sounds like excellent relief.     ROS: He reports back pain, neck pain.  Balance of review of systems is negative.    Medical, surgical, family and social history reviewed elsewhere in record.    Medications/Allergies: See med card    Vitals:    10/12/17 1306   BP: 120/70   Pulse: 78   Resp: 20   Temp: 98.1 °F (36.7 °C)   TempSrc: Oral   Weight: 99.2 kg (218 lb 11.1 oz)   PainSc:   6   PainLoc: Knee         Physical exam:  Gen: A and O x3, pleasant, well-groomed  Skin: No rashes or obvious  lesions  HEENT: PERRLA, no obvious deformities on ears or in canals.Trachea midline.  CVS: Regular rate and rhythm, normal palpable pulses.  Resp: Clear to auscultation bilaterally, no wheezes or rales.  Abdomen: Soft, NT/ND.  Musculoskeletal:  Antalgic gait due to left knee pain.  Wearing a knee brace.    Neuro:  Lower extremities: 5/5 strength bilaterally  Reflexes: Patellar 2+, Achilles 2+ bilaterally.  Sensory:  Intact and symmetrical to light touch and pinprick in L2-S1 dermatomes bilaterally.    Lumbar spine:  Lumbar spine: ROM is mildly limited with flexion moderately limited with extension and oblique extension with increased low back pain during each maneuver but especially with extension.  Juma's test causes no increased pain on either side.    Supine straight leg raise causes posterior thigh pain bilaterally.  Internal and external rotation of the hip causes no increased pain on either side.  Myofascial exam: No tenderness to palpation across lumbar paraspinous muscles.      Imaging:  MRI cervical spine 5/16/16: C1/2 normal.  C2/3 mild facet arthropathy.  At C3/4 mild facet arthropathy.  C4/5 mild small broad-based posterior disc bulge and facet arthrosis resulting in ventral CSF space effacement and mild ventral cord flattening.  At C5/6 there is broad-based disc bulge with focal left paracentral disc protrusion bilateral facet arthrosis and left uncovertebral spurring resulting in severe left neural foraminal narrowing, ventral CSF space effacement and mild ventral cord flattening.  At C6/7 there is broad-based posterior disc bulge and osteophytes bilateral uncovertebral spurring and facet arthropathy resulting in partial ventral CSF space effacement, mild ventral cord flattening and severe bilateral foraminal stenosis.  At C7/T1 there is broad-based posterior disc bulge with bilateral facet arthrosis and left uncovertebral spurring resulting in partial CSF space effacement and ventral cord  flattening and severe left neural foraminal stenosis.    CT lumbar spine report 7/15/11  L2-3 mild disc bulge  L3-4 mild bulging of the disc with flattening of the ventral thecal sac resulting in mild narrowing of the foramina  L4-5 mild bulging of the disc is present along with ligamentum flavum hypertrophy combine to result in a moderate degree of canal stenosis with mild foraminal narrowing    MRI lumbar spine 8/25/17  At the T12-L1 normal, mild ligamentous hypertrophy is noted, no significant central canal stenosis or neural foraminal stenosis is noted.  At the L1-L2 level, mild ligamentous hypertrophy appears to be present,  no significant disk bulge, central canal stenosis, or neural foraminal stenosis is noted  At L2-L3 level, broad-based bulge or protrusion appears to be present of 4 mm with asymmetric bulging towards each neural foramen. Mild bilateral neural foraminal stenosis is noted. Mild central canal narrowing is noted to 9 mm. Mild bilateral lateral recess narrowing is noted.  At L3-L4 level, asymmetric bulging is noted towards the left neural foramen slightly greater than right of approximately 3 mm, mild left greater than right neural foraminal narrowing is noted without significant central canal stenosis. Mild facet arthropathy and ligamentous hypertrophy is noted.  At the L4-L5 level, broad-based protrusion appears to be present centrally of 6 mm with increased T2 signal suggestive of an annular tear. Ligamentous hypertrophy and facet arthropathy appears to be present. Moderate central canal stenosis appears to be present with bunching of the nerve roots and central canal narrowing to 7 mm. Bilateral lateral recess stenosis is noted. Mild/moderate bilateral neural foraminal narrowing is noted slightly greater to the right than left.  At the L5-S1 level, broad-based bulge is noted centrally of 3 mm with increased T2 signal this can be seen with an annular tear, mild left greater than right neural  foraminal stenosis appears to be present. Mild central canal narrowing is noted.      Assessment:  The patient is a 62-year-old man with a history of hypertension, chronic neck and back pain who presents in referral from [facility] for continued neck and back pain.   1. Lumbar radiculopathy  Vital signs    Activity as tolerated    Verify informed consent    Notify physician     Notify physician     Notify physician (specify)    Diet NPO    Case Request Operating Room: INJECTION-STEROID-EPIDURAL-LUMBAR L5/S1    Place in Outpatient    alprazolam ODT dissolvable tablet 1 mg   2. Cervical radiculopathy     3. Cervical stenosis of spine     4. Spinal stenosis of lumbar region with neurogenic claudication     5. Chronic pain of left knee           Plan:  1.  The patient has sufficient relief of his neck pain following his second cervical SOLIS.  This can be repeated in the future if necessary.  2.  I reviewed his new lumbar spine MRI results with him and we discussed that he has moderate canal stenosis at L4-5 likely causing his pain.  I will schedule him for an L5/S1 interlaminar epidural steroid injection.  3.  He will follow-up with orthopedics for his left knee.  He states that he was told he has a torn meniscus.  4.  Follow-up in 4 weeks post procedure or sooner as needed.

## 2017-10-31 NOTE — DISCHARGE SUMMARY
[facility]  Discharge Note  Short Stay    Admit Date: 10/31/2017    Discharge Date: 10/31/2017    Attending Physician: Phil Ann MD     Discharge Provider: Phil Ann    Diagnoses:  Active Hospital Problems    Diagnosis  POA    *Lumbar radiculopathy [M54.16]  Yes      Resolved Hospital Problems    Diagnosis Date Resolved POA   No resolved problems to display.       Discharged Condition: good    Final Diagnoses: Lumbar radiculopathy [M54.16]    Disposition: Home or Self Care    Hospital Course: no complications, uneventful    Outcome of Hospitalization, Treatment, Procedure, or Surgery:  Patient was admitted for outpatient procedure. The patient underwent procedure without complications and are discharged home    Follow up/Patient Instructions:  Follow up as scheduled/Patient has received instructions and follow up date    Medications:  Continue previous medications      Discharge Procedure Orders  Diet general     Activity as tolerated     Call MD for:  temperature >100.4     Call MD for:  severe uncontrolled pain     Call MD for:  redness, tenderness, or signs of infection (pain, swelling, redness, odor or green/yellow discharge around incision site)     Call MD for:  severe persistent headache     No dressing needed           Discharge Procedure Orders (must include Diet, Follow-up, Activity):    Discharge Procedure Orders (must include Diet, Follow-up, Activity)  Diet general     Activity as tolerated     Call MD for:  temperature >100.4     Call MD for:  severe uncontrolled pain     Call MD for:  redness, tenderness, or signs of infection (pain, swelling, redness, odor or green/yellow discharge around incision site)     Call MD for:  severe persistent headache     No dressing needed

## 2017-10-31 NOTE — OP NOTE

## 2017-11-02 ENCOUNTER — OFFICE VISIT (OUTPATIENT)
Dept: FAMILY MEDICINE | Facility: CLINIC | Age: 62
End: 2017-11-02
Payer: MEDICARE

## 2017-11-02 VITALS
HEIGHT: 72 IN | HEART RATE: 66 BPM | TEMPERATURE: 98 F | BODY MASS INDEX: 30.09 KG/M2 | WEIGHT: 222.19 LBS | SYSTOLIC BLOOD PRESSURE: 119 MMHG | DIASTOLIC BLOOD PRESSURE: 76 MMHG

## 2017-11-02 DIAGNOSIS — M51.36 DDD (DEGENERATIVE DISC DISEASE), LUMBAR: ICD-10-CM

## 2017-11-02 DIAGNOSIS — M54.16 LUMBAR RADICULOPATHY: Primary | ICD-10-CM

## 2017-11-02 DIAGNOSIS — M54.12 CERVICAL RADICULOPATHY: ICD-10-CM

## 2017-11-02 PROCEDURE — 99999 PR PBB SHADOW E&M-EST. PATIENT-LVL IV: CPT | Mod: PBBFAC,,, | Performed by: FAMILY MEDICINE

## 2017-11-02 PROCEDURE — 99214 OFFICE O/P EST MOD 30 MIN: CPT | Mod: PBBFAC,PO | Performed by: FAMILY MEDICINE

## 2017-11-02 PROCEDURE — 99213 OFFICE O/P EST LOW 20 MIN: CPT | Mod: S$PBB,,, | Performed by: FAMILY MEDICINE

## 2017-11-02 RX ORDER — HYDROCODONE BITARTRATE AND ACETAMINOPHEN 10; 325 MG/1; MG/1
1 TABLET ORAL 4 TIMES DAILY
Qty: 360 TABLET | Refills: 0 | Status: SHIPPED | OUTPATIENT
Start: 2017-11-02 | End: 2018-01-31 | Stop reason: SDUPTHER

## 2017-11-02 NOTE — PROGRESS NOTES
The patient presents today fu chronic pain management generally tolerating w HC 10 Recent torn meniscus left   Hx cervical stenosis and lumbar disc disease w chonic pain and radiculopathy. He has DJD todd bilateral knees.  HBP and HLP controlled      Past Medical History:  Past Medical History:   Diagnosis Date    Anticoagulant long-term use     aspirn    Asthma     Cervical stenosis of spine     DDD (degenerative disc disease), lumbar     Depression     DJD (degenerative joint disease)     Hyperlipidemia     Hypertension     Intervertebral disc protrusion     Skin abnormalities      Past Surgical History:   Procedure Laterality Date    cervical injection      COLONOSCOPY      MULTIPLE TOOTH EXTRACTIONS       Review of patient's allergies indicates:   Allergen Reactions    Ace inhibitors Swelling     Current Outpatient Prescriptions on File Prior to Visit   Medication Sig Dispense Refill    albuterol 90 mcg/actuation inhaler Inhale 2 puffs into the lungs every 6 (six) hours as needed for Wheezing. 54 g 4    amlodipine (NORVASC) 10 MG tablet Take 1 tablet (10 mg total) by mouth once daily. 90 tablet 3    aspirin 81 MG Chew Take 1 tablet (81 mg total) by mouth once daily. 90 tablet 3    atenolol (TENORMIN) 25 MG tablet Take 0.5 tablets (12.5 mg total) by mouth once daily. 90 tablet 3    diltiaZEM (DILACOR XR) 180 MG CDCR Take 1 capsule (180 mg total) by mouth 2 (two) times daily. 180 capsule 3    fluoxetine (PROZAC) 20 MG capsule Take 1 capsule (20 mg total) by mouth once daily. 90 capsule 3    gabapentin (NEURONTIN) 300 MG capsule Take 2 capsules (600 mg total) by mouth 3 (three) times daily. 540 capsule 3    hydrochlorothiazide (HYDRODIURIL) 12.5 MG Tab Take 1 tablet (12.5 mg total) by mouth once daily. 90 tablet 3    hydrocodone-acetaminophen 10-325mg (NORCO)  mg Tab Take 1 tablet by mouth 4 (four) times daily. 360 tablet 0    ibuprofen (ADVIL,MOTRIN) 800 MG tablet Take 1 tablet (800 mg  total) by mouth 4 (four) times daily. 360 tablet 3    ketoconazole (NIZORAL) 2 % shampoo Apply topically 3 (three) times a week. 120 mL 3    loratadine (CLARITIN) 10 mg tablet Take 1 tablet (10 mg total) by mouth once daily. 90 tablet 3    LUBRICANT EYE, POLYV ALCOHOL, 1.4 % ophthalmic solution       meloxicam (MOBIC) 15 MG tablet Take 0.5 tablets (7.5 mg total) by mouth once daily. 90 tablet 3    potassium chloride (KLOR-CON) 10 MEQ TbSR Take 2 tablets (20 mEq total) by mouth 2 (two) times daily. 360 tablet 3    simvastatin (ZOCOR) 20 MG tablet Take 1 tablet (20 mg total) by mouth every evening. 90 tablet 3    SYMBICORT 80-4.5 mcg/actuation HFAA       terbinafine HCl (LAMISIL) 1 % cream Apply topically 2 (two) times daily. 42 g 3    travoprost, benzalkonium, (TRAVATAN) 0.004 % ophthalmic solution Place 1 drop into both eyes nightly. 5 mL 3    triamcinolone acetonide 0.1% (KENALOG) 0.1 % Lotn Apply topically 2 (two) times daily. 60 mL 3    white petrolatum-mineral oil (EUCERIN) Crea Apply topically as needed. 454 g 3     No current facility-administered medications on file prior to visit.      Social History     Social History    Marital status:      Spouse name: N/A    Number of children: 3    Years of education: N/A     Occupational History    Not on file.     Social History Main Topics    Smoking status: Current Some Day Smoker     Packs/day: 0.50     Last attempt to quit: 5/12/2015    Smokeless tobacco: Never Used      Comment: smokes once per month    Alcohol use No    Drug use: No    Sexual activity: Not on file     Other Topics Concern    Not on file     Social History Narrative    No narrative on file     History reviewed. No pertinent family history.      ROS:GENERAL: No fever, chills, fatigability or weight loss.  SKIN: No rashes, itching or changes in color or texture of skin.  HEAD: No headaches or recent head trauma.EYES: Visual acuity fine. No photophobia, ocular pain or  diplopia.EARS: Denies ear pain, discharge or vertigo.NOSE: No loss of smell, no epistaxis or postnasal drip.MOUTH & THROAT: No hoarseness or change in voice. No excessive gum bleeding.NODES: Denies swollen glands.  CHEST: Denies CHESTER, cyanosis, wheezing, cough and sputum production.  CARDIOVASCULAR: Denies chest pain, PND, orthopnea or reduced exercise tolerance.  ABDOMEN: Appetite fine. No weight loss. Denies diarrhea, abdominal pain, hematemesis or blood in stool.  URINARY: No flank pain, dysuria or hematuria.  PERIPHERAL VASCULAR: No claudication or cyanosis.  MUSCULOSKELETAL: See above.  NEUROLOGIC: No history of seizures, paralysis, alteration of gait or coordination.  PE:   HEAD: Normocephalic, atraumatic.EYES: PERRL. EOMI.   EARS: TM's intact. Light reflex normal. No retraction or perforation.   NOSE: Mucosa pink. Airway clear.MOUTH & THROAT: No tonsillar enlargement. No pharyngeal erythema or exudate. No stridor.  NODES: No cervical, axillary or inguinal lymph node enlargement.  CHEST: Lungs clear to auscultation except scattered ronchi   CARDIOVASCULAR: Normal S1, S2. No rubs, murmurs or gallops.  ABDOMEN: Bowel sounds normal. Not distended. Soft. No tenderness or masses.  MUSCULOSKELETAL: Tender bilateral paracervical paralumbar neg SLR, tender knees and shoulders lt injected     NEUROLOGIC: Cranial Nerves: II-XII grossly intact.  Motor: 5/5 strength major flexors/extensors.  DTR's: Knees, Ankles 2+ and equal bilaterally; downgoing toes.  Sensory: Decr sensations hands  Gait & Posture: Antalgic gait     Impression: Cerv stenosis  Shoulder arthropathy  Radiculopathy  Lt meniscal tear   HBP  HLP     Plan:   Rev med recs   Rev pain management    Reviewed meds ,pain management ,risks benefits meds

## 2017-11-21 ENCOUNTER — OFFICE VISIT (OUTPATIENT)
Dept: PAIN MEDICINE | Facility: CLINIC | Age: 62
End: 2017-11-21
Payer: MEDICARE

## 2017-11-21 VITALS
SYSTOLIC BLOOD PRESSURE: 130 MMHG | DIASTOLIC BLOOD PRESSURE: 72 MMHG | BODY MASS INDEX: 29.36 KG/M2 | TEMPERATURE: 98 F | RESPIRATION RATE: 18 BRPM | WEIGHT: 216.5 LBS | HEART RATE: 74 BPM

## 2017-11-21 DIAGNOSIS — M54.16 LUMBAR RADICULOPATHY: Primary | ICD-10-CM

## 2017-11-21 DIAGNOSIS — M48.062 SPINAL STENOSIS OF LUMBAR REGION WITH NEUROGENIC CLAUDICATION: ICD-10-CM

## 2017-11-21 DIAGNOSIS — M48.02 CERVICAL STENOSIS OF SPINE: ICD-10-CM

## 2017-11-21 DIAGNOSIS — M54.12 CERVICAL RADICULOPATHY: ICD-10-CM

## 2017-11-21 PROCEDURE — 99999 PR PBB SHADOW E&M-EST. PATIENT-LVL V: CPT | Mod: PBBFAC,,, | Performed by: PHYSICIAN ASSISTANT

## 2017-11-21 PROCEDURE — 99213 OFFICE O/P EST LOW 20 MIN: CPT | Mod: S$PBB,,, | Performed by: PHYSICIAN ASSISTANT

## 2017-11-21 PROCEDURE — 99215 OFFICE O/P EST HI 40 MIN: CPT | Mod: PBBFAC,PO | Performed by: PHYSICIAN ASSISTANT

## 2017-11-21 RX ORDER — ALPRAZOLAM 0.5 MG/1
0.5 TABLET, ORALLY DISINTEGRATING ORAL ONCE AS NEEDED
Status: CANCELLED | OUTPATIENT
Start: 2018-01-12 | End: 2018-01-12

## 2017-11-22 NOTE — PROGRESS NOTES
This note was completed with dictation software and grammatical errors may exist.    CC: Neck pain, back pain    HPI: The patient is a 62-year-old man with a history of hypertension, chronic neck and back pain who presents in referral from [facility] for continued neck and back pain.  He is status post L5/S1 interlaminar epidural steroid injection on 10/31/17 with 30% relief.  He continues to have low back pain radiating to the buttocks and posterior thighs.  His pain is worse with walking and standing, improved with sitting down.  He also continues to have neck pain but this is currently tolerable.  He reports numbness in his left foot.  He reports weakness in his left leg because of his knee.  He denies bladder or bowel incontinence.    Pain intervention history: [facility] gabapentin, Robaxin, capsaicin cream for his neck.  He has had lumbar epidural steroid injections but nothing for his neck.  He is status post C7-T1 cervical interlaminar epidural steroid injection on 8/22/16 with what sounds like moderate relief.  He is status post C7-T1 cervical interlaminar epidural steroid on 7/24/17 with 25% relief.  He is not interested in doing any physical therapy.  He is status post C7-T1 cervical interlaminar epidural steroid injection on 9/12/17 with what sounds like excellent relief.  He is status post L5/S1 interlaminar epidural steroid injection on 10/31/17 with 30% relief.     ROS: He reports back pain, neck pain.  Balance of review of systems is negative.    Medical, surgical, family and social history reviewed elsewhere in record.    Medications/Allergies: See med card    Vitals:    11/21/17 1103   BP: 130/72   Pulse: 74   Resp: 18   Temp: 97.9 °F (36.6 °C)   TempSrc: Oral   Weight: 98.2 kg (216 lb 7.9 oz)   PainSc:   4   PainLoc: Back         Physical exam:  Gen: A and O x3, pleasant, well-groomed  Skin: No rashes or obvious lesions  HEENT: PERRLA, no obvious  deformities on ears or in canals.Trachea midline.  CVS: Regular rate and rhythm, normal palpable pulses.  Resp: Clear to auscultation bilaterally, no wheezes or rales.  Abdomen: Soft, NT/ND.  Musculoskeletal:  Antalgic gait due to left knee pain.  Wearing a knee brace.    Neuro:  Upper extremities: 5/5 strength bilaterally   Lower extremities: 5/5 strength bilaterally  Reflexes: Brachioradialis 2+, Bicep 2+, Tricep 2+.  Patellar 2+, Achilles 2+ bilaterally.  Sensory: Intact and symmetrical to light touch and pinprick in C2-T1 dermatomes bilaterally.  Intact and symmetrical to light touch and pinprick in L2-S1 dermatomes bilaterally.    Cervical Spine:  Cervical spine: ROM is full in flexion, extension and lateral rotation without increased pain.  Spurling's maneuver causes no neck pain to either side.  Myofascial exam: No Tenderness to palpation across cervical paraspinous region bilaterally.    Lumbar spine:  Lumbar spine: ROM is mildly limited with flexion moderately limited with extension and oblique extension with increased low back pain during each maneuver but especially with extension.  Juma's test causes no increased pain on either side.    Supine straight leg raise causes posterior thigh pain bilaterally.  Internal and external rotation of the hip causes no increased pain on either side.  Myofascial exam: No tenderness to palpation across lumbar paraspinous muscles.      Imaging:  MRI cervical spine 5/16/16: C1/2 normal.  C2/3 mild facet arthropathy.  At C3/4 mild facet arthropathy.  C4/5 mild small broad-based posterior disc bulge and facet arthrosis resulting in ventral CSF space effacement and mild ventral cord flattening.  At C5/6 there is broad-based disc bulge with focal left paracentral disc protrusion bilateral facet arthrosis and left uncovertebral spurring resulting in severe left neural foraminal narrowing, ventral CSF space effacement and mild ventral cord flattening.  At C6/7 there is  broad-based posterior disc bulge and osteophytes bilateral uncovertebral spurring and facet arthropathy resulting in partial ventral CSF space effacement, mild ventral cord flattening and severe bilateral foraminal stenosis.  At C7/T1 there is broad-based posterior disc bulge with bilateral facet arthrosis and left uncovertebral spurring resulting in partial CSF space effacement and ventral cord flattening and severe left neural foraminal stenosis.    CT lumbar spine report 7/15/11  L2-3 mild disc bulge  L3-4 mild bulging of the disc with flattening of the ventral thecal sac resulting in mild narrowing of the foramina  L4-5 mild bulging of the disc is present along with ligamentum flavum hypertrophy combine to result in a moderate degree of canal stenosis with mild foraminal narrowing    MRI lumbar spine 8/25/17  At the T12-L1 normal, mild ligamentous hypertrophy is noted, no significant central canal stenosis or neural foraminal stenosis is noted.  At the L1-L2 level, mild ligamentous hypertrophy appears to be present,  no significant disk bulge, central canal stenosis, or neural foraminal stenosis is noted  At L2-L3 level, broad-based bulge or protrusion appears to be present of 4 mm with asymmetric bulging towards each neural foramen. Mild bilateral neural foraminal stenosis is noted. Mild central canal narrowing is noted to 9 mm. Mild bilateral lateral recess narrowing is noted.  At L3-L4 level, asymmetric bulging is noted towards the left neural foramen slightly greater than right of approximately 3 mm, mild left greater than right neural foraminal narrowing is noted without significant central canal stenosis. Mild facet arthropathy and ligamentous hypertrophy is noted.  At the L4-L5 level, broad-based protrusion appears to be present centrally of 6 mm with increased T2 signal suggestive of an annular tear. Ligamentous hypertrophy and facet arthropathy appears to be present. Moderate central canal stenosis  appears to be present with bunching of the nerve roots and central canal narrowing to 7 mm. Bilateral lateral recess stenosis is noted. Mild/moderate bilateral neural foraminal narrowing is noted slightly greater to the right than left.  At the L5-S1 level, broad-based bulge is noted centrally of 3 mm with increased T2 signal this can be seen with an annular tear, mild left greater than right neural foraminal stenosis appears to be present. Mild central canal narrowing is noted.      Assessment:  The patient is a 62-year-old man with a history of hypertension, chronic neck and back pain who presents in referral from [facility] for continued neck and back pain.   1. Lumbar radiculopathy  Vital signs    Activity as tolerated    Verify informed consent    Notify physician     Notify physician     Notify physician (specify)    Diet NPO    Case Request Operating Room: INJECTION-STEROID-EPIDURAL-LUMBAR L5/S1    Place in Outpatient    alprazolam ODT dissolvable tablet 0.5 mg   2. Cervical radiculopathy     3. Cervical stenosis of spine     4. Spinal stenosis of lumbar region with neurogenic claudication           Plan:  1.  The patient did well following the L5/S1 interlaminar SOLIS.  I will set him up to repeat this in January.  We discussed that if he is not having lasting relief, I will have him see neurosurgery for further evaluation and operation is not recommended, he may do well with spinal cord stimulation.  2.  We discussed repeating a cervical SOLIS in the future for his neck pain if necessary.  3.  Follow-up in 4 weeks post procedure or sooner as needed.

## 2018-01-10 DIAGNOSIS — M51.36 DDD (DEGENERATIVE DISC DISEASE), LUMBAR: Primary | ICD-10-CM

## 2018-01-15 ENCOUNTER — SURGERY (OUTPATIENT)
Age: 63
End: 2018-01-15

## 2018-01-15 ENCOUNTER — HOSPITAL ENCOUNTER (OUTPATIENT)
Facility: HOSPITAL | Age: 63
Discharge: HOME | End: 2018-01-15
Attending: ANESTHESIOLOGY | Admitting: ANESTHESIOLOGY
Payer: MEDICARE

## 2018-01-15 ENCOUNTER — HOSPITAL ENCOUNTER (OUTPATIENT)
Dept: RADIOLOGY | Facility: HOSPITAL | Age: 63
Discharge: HOME | End: 2018-01-15
Attending: ANESTHESIOLOGY | Admitting: ANESTHESIOLOGY
Payer: MEDICARE

## 2018-01-15 VITALS
WEIGHT: 204 LBS | SYSTOLIC BLOOD PRESSURE: 132 MMHG | HEART RATE: 59 BPM | RESPIRATION RATE: 16 BRPM | HEIGHT: 72 IN | BODY MASS INDEX: 27.63 KG/M2 | DIASTOLIC BLOOD PRESSURE: 87 MMHG | TEMPERATURE: 98 F | OXYGEN SATURATION: 97 %

## 2018-01-15 DIAGNOSIS — M54.16 LUMBAR RADICULOPATHY: Primary | ICD-10-CM

## 2018-01-15 DIAGNOSIS — M51.36 DDD (DEGENERATIVE DISC DISEASE), LUMBAR: ICD-10-CM

## 2018-01-15 PROCEDURE — 25000003 PHARM REV CODE 250: Mod: PO | Performed by: ANESTHESIOLOGY

## 2018-01-15 PROCEDURE — A4216 STERILE WATER/SALINE, 10 ML: HCPCS | Mod: PO | Performed by: ANESTHESIOLOGY

## 2018-01-15 PROCEDURE — 62323 NJX INTERLAMINAR LMBR/SAC: CPT | Mod: ,,, | Performed by: ANESTHESIOLOGY

## 2018-01-15 PROCEDURE — 76000 FLUOROSCOPY <1 HR PHYS/QHP: CPT | Mod: TC,PO

## 2018-01-15 PROCEDURE — 63600175 PHARM REV CODE 636 W HCPCS: Mod: PO | Performed by: ANESTHESIOLOGY

## 2018-01-15 PROCEDURE — 62323 NJX INTERLAMINAR LMBR/SAC: CPT | Mod: PO | Performed by: ANESTHESIOLOGY

## 2018-01-15 PROCEDURE — 25500020 PHARM REV CODE 255: Mod: PO | Performed by: ANESTHESIOLOGY

## 2018-01-15 RX ORDER — LIDOCAINE HYDROCHLORIDE 10 MG/ML
INJECTION, SOLUTION EPIDURAL; INFILTRATION; INTRACAUDAL; PERINEURAL
Status: DISCONTINUED | OUTPATIENT
Start: 2018-01-15 | End: 2018-01-15 | Stop reason: HOSPADM

## 2018-01-15 RX ORDER — ALPRAZOLAM 0.5 MG/1
0.5 TABLET, ORALLY DISINTEGRATING ORAL ONCE AS NEEDED
Status: DISCONTINUED | OUTPATIENT
Start: 2018-01-15 | End: 2018-01-15

## 2018-01-15 RX ORDER — ALPRAZOLAM 0.5 MG/1
1 TABLET, ORALLY DISINTEGRATING ORAL ONCE AS NEEDED
Status: COMPLETED | OUTPATIENT
Start: 2018-01-15 | End: 2018-01-15

## 2018-01-15 RX ORDER — METHYLPREDNISOLONE ACETATE 80 MG/ML
INJECTION, SUSPENSION INTRA-ARTICULAR; INTRALESIONAL; INTRAMUSCULAR; SOFT TISSUE
Status: DISCONTINUED | OUTPATIENT
Start: 2018-01-15 | End: 2018-01-15 | Stop reason: HOSPADM

## 2018-01-15 RX ORDER — SODIUM CHLORIDE 9 MG/ML
INJECTION, SOLUTION INTRAMUSCULAR; INTRAVENOUS; SUBCUTANEOUS
Status: DISCONTINUED | OUTPATIENT
Start: 2018-01-15 | End: 2018-01-15 | Stop reason: HOSPADM

## 2018-01-15 RX ADMIN — ALPRAZOLAM 1 MG: 0.5 TABLET, ORALLY DISINTEGRATING ORAL at 12:01

## 2018-01-15 RX ADMIN — IOHEXOL 3 ML: 300 INJECTION, SOLUTION INTRAVENOUS at 01:01

## 2018-01-15 RX ADMIN — METHYLPREDNISOLONE ACETATE 80 MG: 80 INJECTION, SUSPENSION INTRA-ARTICULAR; INTRALESIONAL; INTRAMUSCULAR; SOFT TISSUE at 01:01

## 2018-01-15 RX ADMIN — SODIUM CHLORIDE 4 ML: 9 INJECTION INTRAMUSCULAR; INTRAVENOUS; SUBCUTANEOUS at 01:01

## 2018-01-15 RX ADMIN — LIDOCAINE HYDROCHLORIDE 10 ML: 10 INJECTION, SOLUTION EPIDURAL; INFILTRATION; INTRACAUDAL; PERINEURAL at 01:01

## 2018-01-15 NOTE — H&P
CC: Back pain    HPI: The patient is a 63yo man with a history of lumbar radiculopathy here for L5/S1 SOLIS. There are no major changes in history and physical from 11/21/17.    Past Medical History:   Diagnosis Date    Anticoagulant long-term use     aspirn    Asthma     Cervical stenosis of spine     DDD (degenerative disc disease), lumbar     Depression     DJD (degenerative joint disease)     Hyperlipidemia     Hypertension     Intervertebral disc protrusion     Skin abnormalities        Past Surgical History:   Procedure Laterality Date    cervical injection      COLONOSCOPY      MULTIPLE TOOTH EXTRACTIONS         History reviewed. No pertinent family history.    Social History     Social History    Marital status:      Spouse name: N/A    Number of children: 3    Years of education: N/A     Social History Main Topics    Smoking status: Current Some Day Smoker     Packs/day: 0.50     Last attempt to quit: 5/12/2015    Smokeless tobacco: Never Used      Comment: smokes once per month    Alcohol use No    Drug use: No    Sexual activity: Not Asked     Other Topics Concern    None     Social History Narrative    None       Current Facility-Administered Medications   Medication Dose Route Frequency Provider Last Rate Last Dose    alprazolam ODT dissolvable tablet 0.5 mg  0.5 mg Oral Once PRN Phil Ann MD           Review of patient's allergies indicates:   Allergen Reactions    Ace inhibitors Swelling       Vitals:    01/09/18 1515 01/15/18 1249   BP:  131/66   Pulse:  (!) 57   Resp:  18   Temp:  97.5 °F (36.4 °C)   TempSrc:  Skin   SpO2:  100%   Weight: 92.5 kg (204 lb)    Height: 6' (1.829 m)        REVIEW OF SYSTEMS:     GENERAL: No weight loss, malaise or fevers.  HEENT:  No recent changes in vision or hearing  NECK: Negative for lumps, no difficulty with swallowing.  RESPIRATORY: Negative for cough, wheezing or shortness of breath, patient denies any recent  URI.  CARDIOVASCULAR: Negative for chest pain, leg swelling or palpitations.  GI: Negative for abdominal discomfort, blood in stools or black stools or change in bowel habits.  MUSCULOSKELETAL: See HPI.  SKIN: Negative for lesions, rash, and itching.  PSYCH: No suicidal or homicidal ideations, no current mood disturbances.  HEMATOLOGY/LYMPHOLOGY: Negative for prolonged bleeding, bruising easily or swollen nodes. Patient is not currently taking any anti-coagulants  ENDO: No history of diabetes or thyroid dysfunction  NEURO: No history of syncope, paralysis, seizures or tremors.All other reviewed and negative other than HPI.    Physical exam:  Gen: A and O x3, pleasant, well-groomed  Skin: No rashes or obvious lesions  HEENT: PERRLA, no obvious deformities on ears or in canals. No thyroid masses, trachea midline, no palpable lymph nodes in neck, axilla.  CVS: Regular rate and rhythm, normal S1 and S2, no murmurs.  Resp: Clear to auscultation bilaterally.  Abdomen: Soft, NT/ND, normal bowel sounds present.  Musculoskeletal/Neuro: Moving all extremities    Assessment:  Lumbar radiculopathy  -     Case Request Operating Room: INJECTION-STEROID-EPIDURAL-LUMBAR L5/S1  -     Activity as tolerated; Standing  -     Place in Outpatient; Standing  -     Diet NPO; Standing  -     alprazolam ODT dissolvable tablet 0.5 mg; Take 1 tablet (0.5 mg total) by mouth once as needed for Anxiety.  -     Notify physician ; Standing  -     Notify physician ; Standing  -     Notify physician (specify); Standing  -     Verify informed consent; Standing  -     Vital signs; Standing

## 2018-01-15 NOTE — OP NOTE

## 2018-01-15 NOTE — DISCHARGE SUMMARY
[facility]  Discharge Note  Short Stay    Admit Date: 1/15/2018    Discharge Date: 1/15/2018    Attending Physician: Phil Ann MD     Discharge Provider: Phil Ann    Diagnoses:  Active Hospital Problems    Diagnosis  POA    *Lumbar radiculopathy [M54.16]  Yes      Resolved Hospital Problems    Diagnosis Date Resolved POA   No resolved problems to display.       Discharged Condition: good    Final Diagnoses: Lumbar radiculopathy [M54.16]    Disposition: Home or Self Care    Hospital Course: no complications, uneventful    Outcome of Hospitalization, Treatment, Procedure, or Surgery:  Patient was admitted for outpatient procedure. The patient underwent procedure without complications and are discharged home    Follow up/Patient Instructions:  Follow up as scheduled/Patient has received instructions and follow up date    Medications:  Continue previous medications      Discharge Procedure Orders  Call MD for:  temperature >100.4     Call MD for:  severe uncontrolled pain     Call MD for:  redness, tenderness, or signs of infection (pain, swelling, redness, odor or green/yellow discharge around incision site)     Call MD for:  severe persistent headache     No dressing needed           Discharge Procedure Orders (must include Diet, Follow-up, Activity):    Discharge Procedure Orders (must include Diet, Follow-up, Activity)  Call MD for:  temperature >100.4     Call MD for:  severe uncontrolled pain     Call MD for:  redness, tenderness, or signs of infection (pain, swelling, redness, odor or green/yellow discharge around incision site)     Call MD for:  severe persistent headache     No dressing needed

## 2018-01-18 ENCOUNTER — PATIENT OUTREACH (OUTPATIENT)
Dept: ADMINISTRATIVE | Facility: HOSPITAL | Age: 63
End: 2018-01-18

## 2018-01-31 ENCOUNTER — OFFICE VISIT (OUTPATIENT)
Dept: FAMILY MEDICINE | Facility: CLINIC | Age: 63
End: 2018-01-31
Payer: MEDICARE

## 2018-01-31 VITALS
DIASTOLIC BLOOD PRESSURE: 87 MMHG | WEIGHT: 218.06 LBS | BODY MASS INDEX: 29.54 KG/M2 | SYSTOLIC BLOOD PRESSURE: 134 MMHG | TEMPERATURE: 98 F | HEIGHT: 72 IN | HEART RATE: 69 BPM

## 2018-01-31 DIAGNOSIS — M54.16 LUMBAR RADICULOPATHY: ICD-10-CM

## 2018-01-31 DIAGNOSIS — M51.36 DDD (DEGENERATIVE DISC DISEASE), LUMBAR: ICD-10-CM

## 2018-01-31 DIAGNOSIS — M48.02 CERVICAL STENOSIS OF SPINE: Primary | ICD-10-CM

## 2018-01-31 DIAGNOSIS — M54.12 CERVICAL RADICULOPATHY: ICD-10-CM

## 2018-01-31 PROCEDURE — 99214 OFFICE O/P EST MOD 30 MIN: CPT | Mod: PBBFAC,PO | Performed by: FAMILY MEDICINE

## 2018-01-31 PROCEDURE — 99213 OFFICE O/P EST LOW 20 MIN: CPT | Mod: S$PBB,,, | Performed by: FAMILY MEDICINE

## 2018-01-31 PROCEDURE — 99999 PR PBB SHADOW E&M-EST. PATIENT-LVL IV: CPT | Mod: PBBFAC,,, | Performed by: FAMILY MEDICINE

## 2018-01-31 RX ORDER — FLUOXETINE HYDROCHLORIDE 20 MG/1
CAPSULE ORAL
COMMUNITY
Start: 2018-01-14 | End: 2019-08-08

## 2018-01-31 RX ORDER — HYDROCODONE BITARTRATE AND ACETAMINOPHEN 10; 325 MG/1; MG/1
1 TABLET ORAL 4 TIMES DAILY
Qty: 360 TABLET | Refills: 0 | Status: SHIPPED | OUTPATIENT
Start: 2018-01-31 | End: 2018-04-30 | Stop reason: SDUPTHER

## 2018-01-31 NOTE — PROGRESS NOTES
The patient presents today fu chronic pain management generally tolerating w HC 10    Hx cervical stenosis and lumbar disc disease w chonic pain and radiculopathy. He has DJD todd bilateral knees.  HBP and HLP controlled      Past Medical History:  Past Medical History:   Diagnosis Date    Anticoagulant long-term use     aspirn    Asthma     Cervical stenosis of spine     DDD (degenerative disc disease), lumbar     Depression     DJD (degenerative joint disease)     Hyperlipidemia     Hypertension     Intervertebral disc protrusion     Skin abnormalities      Past Surgical History:   Procedure Laterality Date    cervical injection      COLONOSCOPY      MULTIPLE TOOTH EXTRACTIONS       Review of patient's allergies indicates:   Allergen Reactions    Ace inhibitors Swelling     Current Outpatient Prescriptions on File Prior to Visit   Medication Sig Dispense Refill    albuterol 90 mcg/actuation inhaler Inhale 2 puffs into the lungs every 6 (six) hours as needed for Wheezing. 54 g 4    amlodipine (NORVASC) 10 MG tablet Take 1 tablet (10 mg total) by mouth once daily. 90 tablet 3    aspirin 81 MG Chew Take 1 tablet (81 mg total) by mouth once daily. 90 tablet 3    atenolol (TENORMIN) 25 MG tablet Take 0.5 tablets (12.5 mg total) by mouth once daily. 90 tablet 3    diltiaZEM (DILACOR XR) 180 MG CDCR Take 1 capsule (180 mg total) by mouth 2 (two) times daily. 180 capsule 3    gabapentin (NEURONTIN) 300 MG capsule Take 2 capsules (600 mg total) by mouth 3 (three) times daily. 540 capsule 3    hydrochlorothiazide (HYDRODIURIL) 12.5 MG Tab Take 1 tablet (12.5 mg total) by mouth once daily. 90 tablet 3    hydrocodone-acetaminophen 10-325mg (NORCO)  mg Tab Take 1 tablet by mouth 4 (four) times daily. 360 tablet 0    ibuprofen (ADVIL,MOTRIN) 800 MG tablet Take 1 tablet (800 mg total) by mouth 4 (four) times daily. 360 tablet 3    ketoconazole (NIZORAL) 2 % shampoo Apply topically 3 (three) times a  week. 120 mL 3    loratadine (CLARITIN) 10 mg tablet Take 1 tablet (10 mg total) by mouth once daily. 90 tablet 3    LUBRICANT EYE, POLYV ALCOHOL, 1.4 % ophthalmic solution       meloxicam (MOBIC) 15 MG tablet Take 0.5 tablets (7.5 mg total) by mouth once daily. 90 tablet 3    potassium chloride (KLOR-CON) 10 MEQ TbSR Take 2 tablets (20 mEq total) by mouth 2 (two) times daily. 360 tablet 3    simvastatin (ZOCOR) 20 MG tablet Take 1 tablet (20 mg total) by mouth every evening. 90 tablet 3    SYMBICORT 80-4.5 mcg/actuation HFAA 2 puffs daily as needed.       terbinafine HCl (LAMISIL) 1 % cream Apply topically 2 (two) times daily. 42 g 3    travoprost, benzalkonium, (TRAVATAN) 0.004 % ophthalmic solution Place 1 drop into both eyes nightly. 5 mL 3    triamcinolone acetonide 0.1% (KENALOG) 0.1 % Lotn Apply topically 2 (two) times daily. 60 mL 3    white petrolatum-mineral oil (EUCERIN) Crea Apply topically as needed. 454 g 3     No current facility-administered medications on file prior to visit.      Social History     Social History    Marital status:      Spouse name: N/A    Number of children: 3    Years of education: N/A     Occupational History    Not on file.     Social History Main Topics    Smoking status: Current Some Day Smoker     Packs/day: 0.50     Last attempt to quit: 5/12/2015    Smokeless tobacco: Never Used      Comment: smokes once per month    Alcohol use No    Drug use: No    Sexual activity: Not on file     Other Topics Concern    Not on file     Social History Narrative    No narrative on file     History reviewed. No pertinent family history.      ROS:GENERAL: No fever, chills, fatigability or weight loss.  SKIN: No rashes, itching or changes in color or texture of skin.  HEAD: No headaches or recent head trauma.EYES: Visual acuity fine. No photophobia, ocular pain or diplopia.EARS: Denies ear pain, discharge or vertigo.NOSE: No loss of smell, no epistaxis or  postnasal drip.MOUTH & THROAT: No hoarseness or change in voice. No excessive gum bleeding.NODES: Denies swollen glands.  CHEST: Denies CHESTER, cyanosis, wheezing, cough and sputum production.  CARDIOVASCULAR: Denies chest pain, PND, orthopnea or reduced exercise tolerance.  ABDOMEN: Appetite fine. No weight loss. Denies diarrhea, abdominal pain, hematemesis or blood in stool.  URINARY: No flank pain, dysuria or hematuria.  PERIPHERAL VASCULAR: No claudication or cyanosis.  MUSCULOSKELETAL: See above.  NEUROLOGIC: No history of seizures, paralysis, alteration of gait or coordination.  PE:   HEAD: Normocephalic, atraumatic.EYES: PERRL. EOMI.   EARS: TM's intact. Light reflex normal. No retraction or perforation.   NOSE: Mucosa pink. Airway clear.MOUTH & THROAT: No tonsillar enlargement. No pharyngeal erythema or exudate. No stridor.  NODES: No cervical, axillary or inguinal lymph node enlargement.  CHEST: Lungs clear to auscultation except scattered ronchi   CARDIOVASCULAR: Normal S1, S2. No rubs, murmurs or gallops.  ABDOMEN: Bowel sounds normal. Not distended. Soft. No tenderness or masses.  MUSCULOSKELETAL: Tender bilateral paracervical paralumbar neg SLR, tender knees and shoulders lt injected     NEUROLOGIC: Cranial Nerves: II-XII grossly intact.  Motor: 5/5 strength major flexors/extensors.  DTR's: Knees, Ankles 2+ and equal bilaterally; downgoing toes.  Sensory: Decr sensations hands  Gait & Posture: Antalgic gait     Impression: Cerv stenosis  Shoulder arthropathy  Radiculopathy  Lt meniscal tear   HBP  HLP     Plan:   Rev med recs   Rev pain management    Reviewed meds ,pain management ,risks benefits meds

## 2018-02-15 ENCOUNTER — OFFICE VISIT (OUTPATIENT)
Dept: PAIN MEDICINE | Facility: CLINIC | Age: 63
End: 2018-02-15
Payer: MEDICARE

## 2018-02-15 VITALS
HEART RATE: 61 BPM | TEMPERATURE: 97 F | SYSTOLIC BLOOD PRESSURE: 147 MMHG | OXYGEN SATURATION: 100 % | WEIGHT: 217.5 LBS | DIASTOLIC BLOOD PRESSURE: 73 MMHG | RESPIRATION RATE: 18 BRPM | BODY MASS INDEX: 29.5 KG/M2

## 2018-02-15 DIAGNOSIS — M48.062 SPINAL STENOSIS OF LUMBAR REGION WITH NEUROGENIC CLAUDICATION: ICD-10-CM

## 2018-02-15 DIAGNOSIS — M54.12 CERVICAL RADICULOPATHY: ICD-10-CM

## 2018-02-15 DIAGNOSIS — M48.02 CERVICAL STENOSIS OF SPINE: ICD-10-CM

## 2018-02-15 DIAGNOSIS — M54.16 LUMBAR RADICULOPATHY: Primary | ICD-10-CM

## 2018-02-15 PROCEDURE — 99215 OFFICE O/P EST HI 40 MIN: CPT | Mod: PBBFAC,PN | Performed by: PHYSICIAN ASSISTANT

## 2018-02-15 PROCEDURE — 99999 PR PBB SHADOW E&M-EST. PATIENT-LVL V: CPT | Mod: PBBFAC,,, | Performed by: PHYSICIAN ASSISTANT

## 2018-02-15 PROCEDURE — 99213 OFFICE O/P EST LOW 20 MIN: CPT | Mod: S$PBB,,, | Performed by: PHYSICIAN ASSISTANT

## 2018-02-16 NOTE — PROGRESS NOTES
This note was completed with dictation software and grammatical errors may exist.    CC: Neck pain, back pain    HPI: The patient is a 62-year-old man with a history of hypertension, chronic neck and back pain who presents in referral from [facility] for continued neck and back pain.  He is status post L5/S1 interlaminar epidural steroid injection on 1/15/18 with 40% relief.  He feels that things are currently tolerable at this time.  He complains of neck pain greater than back pain.  The pain is worse with standing and improved with sitting and pain medication.  He continues to have numbness in his left foot.  He complains of weakness in his left knee.  He denies bladder or bowel incontinence.    Pain intervention history: [facility] gabapentin, Robaxin, capsaicin cream for his neck.  He has had lumbar epidural steroid injections but nothing for his neck.  He is status post C7-T1 cervical interlaminar epidural steroid injection on 8/22/16 with what sounds like moderate relief.  He is status post C7-T1 cervical interlaminar epidural steroid on 7/24/17 with 25% relief.  He is not interested in doing any physical therapy.  He is status post C7-T1 cervical interlaminar epidural steroid injection on 9/12/17 with what sounds like excellent relief.  He is status post L5/S1 interlaminar epidural steroid injection on 10/31/17 with 30% relief.  He is status post L5/S1 interlaminar epidural steroid injection on 1/15/18 with 40% relief.    ROS: He reports back pain, neck pain.  Balance of review of systems is negative.    Medical, surgical, family and social history reviewed elsewhere in record.    Medications/Allergies: See med card    Vitals:    02/15/18 0823   BP: (!) 147/73   Pulse: 61   Resp: 18   Temp: 97.3 °F (36.3 °C)   SpO2: 100%   Weight: 98.6 kg (217 lb 7.7 oz)   PainSc:   4   PainLoc: Neck         Physical exam:  Gen: A and O x3, pleasant, well-groomed  Skin: No  rashes or obvious lesions  HEENT: PERRLA, no obvious deformities on ears or in canals.Trachea midline.  CVS: Regular rate and rhythm, normal palpable pulses.  Resp: Clear to auscultation bilaterally, no wheezes or rales.  Abdomen: Soft, NT/ND.  Musculoskeletal:  Antalgic gait due to left knee pain.  Wearing a knee brace.    Neuro:  Upper extremities: 5/5 strength bilaterally   Lower extremities: 5/5 strength bilaterally  Reflexes: Brachioradialis 2+, Bicep 2+, Tricep 2+.  Patellar 2+, Achilles 2+ bilaterally.  Sensory: Intact and symmetrical to light touch and pinprick in C2-T1 dermatomes bilaterally.  Intact and symmetrical to light touch and pinprick in L2-S1 dermatomes bilaterally.    Cervical Spine:  Cervical spine: ROM is full in flexion, extension and lateral rotation without increased pain.  Spurling's maneuver causes no neck pain to either side.  Myofascial exam: No Tenderness to palpation across cervical paraspinous region bilaterally.    Lumbar spine:  Lumbar spine: ROM is mildly limited with flexion moderately limited with extension and oblique extension with increased low back pain during each maneuver but especially with extension.  Juma's test causes no increased pain on either side.    Supine straight leg raise causes posterior thigh pain bilaterally.  Internal and external rotation of the hip causes no increased pain on either side.  Myofascial exam: No tenderness to palpation across lumbar paraspinous muscles.      Imaging:  MRI cervical spine 5/16/16: C1/2 normal.  C2/3 mild facet arthropathy.  At C3/4 mild facet arthropathy.  C4/5 mild small broad-based posterior disc bulge and facet arthrosis resulting in ventral CSF space effacement and mild ventral cord flattening.  At C5/6 there is broad-based disc bulge with focal left paracentral disc protrusion bilateral facet arthrosis and left uncovertebral spurring resulting in severe left neural foraminal narrowing, ventral CSF space effacement and  mild ventral cord flattening.  At C6/7 there is broad-based posterior disc bulge and osteophytes bilateral uncovertebral spurring and facet arthropathy resulting in partial ventral CSF space effacement, mild ventral cord flattening and severe bilateral foraminal stenosis.  At C7/T1 there is broad-based posterior disc bulge with bilateral facet arthrosis and left uncovertebral spurring resulting in partial CSF space effacement and ventral cord flattening and severe left neural foraminal stenosis.    CT lumbar spine report 7/15/11  L2-3 mild disc bulge  L3-4 mild bulging of the disc with flattening of the ventral thecal sac resulting in mild narrowing of the foramina  L4-5 mild bulging of the disc is present along with ligamentum flavum hypertrophy combine to result in a moderate degree of canal stenosis with mild foraminal narrowing    MRI lumbar spine 8/25/17  At the T12-L1 normal, mild ligamentous hypertrophy is noted, no significant central canal stenosis or neural foraminal stenosis is noted.  At the L1-L2 level, mild ligamentous hypertrophy appears to be present,  no significant disk bulge, central canal stenosis, or neural foraminal stenosis is noted  At L2-L3 level, broad-based bulge or protrusion appears to be present of 4 mm with asymmetric bulging towards each neural foramen. Mild bilateral neural foraminal stenosis is noted. Mild central canal narrowing is noted to 9 mm. Mild bilateral lateral recess narrowing is noted.  At L3-L4 level, asymmetric bulging is noted towards the left neural foramen slightly greater than right of approximately 3 mm, mild left greater than right neural foraminal narrowing is noted without significant central canal stenosis. Mild facet arthropathy and ligamentous hypertrophy is noted.  At the L4-L5 level, broad-based protrusion appears to be present centrally of 6 mm with increased T2 signal suggestive of an annular tear. Ligamentous hypertrophy and facet arthropathy appears to  be present. Moderate central canal stenosis appears to be present with bunching of the nerve roots and central canal narrowing to 7 mm. Bilateral lateral recess stenosis is noted. Mild/moderate bilateral neural foraminal narrowing is noted slightly greater to the right than left.  At the L5-S1 level, broad-based bulge is noted centrally of 3 mm with increased T2 signal this can be seen with an annular tear, mild left greater than right neural foraminal stenosis appears to be present. Mild central canal narrowing is noted.      Assessment:  The patient is a 62-year-old man with a history of hypertension, chronic neck and back pain who presents in referral from [facility] for continued neck and back pain.   1. Lumbar radiculopathy     2. Cervical radiculopathy     3. Cervical stenosis of spine     4. Spinal stenosis of lumbar region with neurogenic claudication           Plan:  1.  The patient feels that his pain is currently tolerable following the L5/S1 interlaminar SOLIS.  This can be repeated in the future if necessary.  We also discussed repeating a cervical SOLIS for his neck pain in the future as well.  2.  Follow-up on an as-needed basis.    Greater than 50% of this 15 minute visit was spent on counseling the patient.

## 2018-04-16 ENCOUNTER — PATIENT OUTREACH (OUTPATIENT)
Dept: ADMINISTRATIVE | Facility: HOSPITAL | Age: 63
End: 2018-04-16

## 2018-04-16 NOTE — LETTER
April 16, 2018    Migue Sellers  [address] [address] [address]           [facility]  [address]  [place] [address]  Phone: [phone] Dear Mr. Sellers:    [facility] is committed to your overall health.  To help you get the most out of each of your visits, we will review your information to make sure you are up to date on all of your recommended tests and/or procedures.      Champ Kramer MD has found that you may be due for   Health Maintenance Due   Topic    Hepatitis C Screening     Pneumococcal PPSV23 (Medium Risk) (1)    Lipid Panel     Influenza Vaccine       If you have had any of the above done at another facility, please bring the records or information with you so that your record at [facility] will be complete.    If you are currently taking medication, please bring it with you to your appointment for review.    We will be happy to assist you with scheduling any necessary appointments or you may contact the [facility] appointment desk at [phone] to schedule at your convenience.     Thank you for choosing [facility] for your healthcare needs,    If you have any questions or concerns, please don't hesitate to call.    Sincerely,    CHELSY Lund  Care Coordination Department  [facility]  [phone]

## 2018-04-26 ENCOUNTER — LAB VISIT (OUTPATIENT)
Dept: LAB | Facility: HOSPITAL | Age: 63
End: 2018-04-26
Attending: FAMILY MEDICINE
Payer: MEDICARE

## 2018-04-26 DIAGNOSIS — Z13.220 SCREENING CHOLESTEROL LEVEL: ICD-10-CM

## 2018-04-26 LAB
CHOLEST SERPL-MCNC: 177 MG/DL
CHOLEST/HDLC SERPL: 4.9 {RATIO}
HDLC SERPL-MCNC: 36 MG/DL
HDLC SERPL: 20.3 %
LDLC SERPL CALC-MCNC: 121.4 MG/DL
NONHDLC SERPL-MCNC: 141 MG/DL
TRIGL SERPL-MCNC: 98 MG/DL

## 2018-04-26 PROCEDURE — 80061 LIPID PANEL: CPT

## 2018-04-26 PROCEDURE — 36415 COLL VENOUS BLD VENIPUNCTURE: CPT | Mod: PO

## 2018-04-30 ENCOUNTER — OFFICE VISIT (OUTPATIENT)
Dept: FAMILY MEDICINE | Facility: CLINIC | Age: 63
End: 2018-04-30
Payer: MEDICARE

## 2018-04-30 VITALS
HEART RATE: 69 BPM | DIASTOLIC BLOOD PRESSURE: 77 MMHG | HEIGHT: 72 IN | SYSTOLIC BLOOD PRESSURE: 134 MMHG | WEIGHT: 220 LBS | BODY MASS INDEX: 29.8 KG/M2

## 2018-04-30 DIAGNOSIS — M51.36 DDD (DEGENERATIVE DISC DISEASE), LUMBAR: ICD-10-CM

## 2018-04-30 DIAGNOSIS — M54.12 CERVICAL RADICULOPATHY: ICD-10-CM

## 2018-04-30 DIAGNOSIS — M54.16 LUMBAR RADICULOPATHY: ICD-10-CM

## 2018-04-30 DIAGNOSIS — M48.02 CERVICAL STENOSIS OF SPINE: Primary | ICD-10-CM

## 2018-04-30 PROCEDURE — 99213 OFFICE O/P EST LOW 20 MIN: CPT | Mod: S$PBB,,, | Performed by: FAMILY MEDICINE

## 2018-04-30 PROCEDURE — 99213 OFFICE O/P EST LOW 20 MIN: CPT | Mod: PBBFAC,PO | Performed by: FAMILY MEDICINE

## 2018-04-30 PROCEDURE — 99999 PR PBB SHADOW E&M-EST. PATIENT-LVL III: CPT | Mod: PBBFAC,,, | Performed by: FAMILY MEDICINE

## 2018-04-30 RX ORDER — MONTELUKAST SODIUM 10 MG/1
10 TABLET ORAL NIGHTLY
Qty: 90 TABLET | Refills: 3 | Status: SHIPPED | OUTPATIENT
Start: 2018-04-30 | End: 2018-05-30

## 2018-04-30 RX ORDER — HYDROCODONE BITARTRATE AND ACETAMINOPHEN 10; 325 MG/1; MG/1
1 TABLET ORAL 4 TIMES DAILY
Qty: 360 TABLET | Refills: 0 | Status: SHIPPED | OUTPATIENT
Start: 2018-04-30 | End: 2018-07-30 | Stop reason: SDUPTHER

## 2018-04-30 NOTE — PROGRESS NOTES
The patient presents today fu chronic pain management generally tolerating w HC 10    Hx cervical stenosis and lumbar disc disease w chonic pain and radiculopathy. He has DJD todd bilateral knees.  HBP and HLP controlled      Past Medical History:  Past Medical History:   Diagnosis Date    Anticoagulant long-term use     aspirn    Asthma     Cervical stenosis of spine     DDD (degenerative disc disease), lumbar     Depression     DJD (degenerative joint disease)     Hyperlipidemia     Hypertension     Intervertebral disc protrusion     Skin abnormalities      Past Surgical History:   Procedure Laterality Date    cervical injection      COLONOSCOPY      MULTIPLE TOOTH EXTRACTIONS       Review of patient's allergies indicates:   Allergen Reactions    Ace inhibitors Swelling     Current Outpatient Prescriptions on File Prior to Visit   Medication Sig Dispense Refill    albuterol 90 mcg/actuation inhaler Inhale 2 puffs into the lungs every 6 (six) hours as needed for Wheezing. 54 g 4    amlodipine (NORVASC) 10 MG tablet Take 1 tablet (10 mg total) by mouth once daily. 90 tablet 3    aspirin 81 MG Chew Take 1 tablet (81 mg total) by mouth once daily. 90 tablet 3    atenolol (TENORMIN) 25 MG tablet Take 0.5 tablets (12.5 mg total) by mouth once daily. 90 tablet 3    diltiaZEM (DILACOR XR) 180 MG CDCR Take 1 capsule (180 mg total) by mouth 2 (two) times daily. 180 capsule 3    FLUoxetine (PROZAC) 20 MG capsule       gabapentin (NEURONTIN) 300 MG capsule Take 2 capsules (600 mg total) by mouth 3 (three) times daily. 540 capsule 3    hydrochlorothiazide (HYDRODIURIL) 12.5 MG Tab Take 1 tablet (12.5 mg total) by mouth once daily. 90 tablet 3    hydrocodone-acetaminophen 10-325mg (NORCO)  mg Tab Take 1 tablet by mouth 4 (four) times daily. 360 tablet 0    ibuprofen (ADVIL,MOTRIN) 800 MG tablet Take 1 tablet (800 mg total) by mouth 4 (four) times daily. 360 tablet 3    ketoconazole (NIZORAL) 2 %  shampoo Apply topically 3 (three) times a week. 120 mL 3    loratadine (CLARITIN) 10 mg tablet Take 1 tablet (10 mg total) by mouth once daily. 90 tablet 3    LUBRICANT EYE, POLYV ALCOHOL, 1.4 % ophthalmic solution       meloxicam (MOBIC) 15 MG tablet Take 0.5 tablets (7.5 mg total) by mouth once daily. 90 tablet 3    potassium chloride (KLOR-CON) 10 MEQ TbSR Take 2 tablets (20 mEq total) by mouth 2 (two) times daily. 360 tablet 3    simvastatin (ZOCOR) 20 MG tablet Take 1 tablet (20 mg total) by mouth every evening. 90 tablet 3    SYMBICORT 80-4.5 mcg/actuation HFAA 2 puffs daily as needed.       terbinafine HCl (LAMISIL) 1 % cream Apply topically 2 (two) times daily. 42 g 3    travoprost, benzalkonium, (TRAVATAN) 0.004 % ophthalmic solution Place 1 drop into both eyes nightly. 5 mL 3    triamcinolone acetonide 0.1% (KENALOG) 0.1 % Lotn Apply topically 2 (two) times daily. 60 mL 3    white petrolatum-mineral oil (EUCERIN) Crea Apply topically as needed. 454 g 3     No current facility-administered medications on file prior to visit.      Social History     Social History    Marital status:      Spouse name: N/A    Number of children: 3    Years of education: N/A     Occupational History    Not on file.     Social History Main Topics    Smoking status: Current Some Day Smoker     Packs/day: 0.50     Last attempt to quit: 5/12/2015    Smokeless tobacco: Never Used      Comment: smokes once per month    Alcohol use No    Drug use: No    Sexual activity: Not on file     Other Topics Concern    Not on file     Social History Narrative    No narrative on file     History reviewed. No pertinent family history.      ROS:GENERAL: No fever, chills, fatigability or weight loss.  SKIN: No rashes, itching or changes in color or texture of skin.  HEAD: No headaches or recent head trauma.EYES: Visual acuity fine. No photophobia, ocular pain or diplopia.EARS: Denies ear pain, discharge or vertigo.NOSE:  No loss of smell, no epistaxis or postnasal drip.MOUTH & THROAT: No hoarseness or change in voice. No excessive gum bleeding.NODES: Denies swollen glands.  CHEST: Denies CHESTER, cyanosis, wheezing, cough and sputum production.  CARDIOVASCULAR: Denies chest pain, PND, orthopnea or reduced exercise tolerance.  ABDOMEN: Appetite fine. No weight loss. Denies diarrhea, abdominal pain, hematemesis or blood in stool.  URINARY: No flank pain, dysuria or hematuria.  PERIPHERAL VASCULAR: No claudication or cyanosis.  MUSCULOSKELETAL: See above.  NEUROLOGIC: No history of seizures, paralysis, alteration of gait or coordination.  PE:   HEAD: Normocephalic, atraumatic.EYES: PERRL. EOMI.   EARS: TM's intact. Light reflex normal. No retraction or perforation.   NOSE: Mucosa pink. Airway clear.MOUTH & THROAT: No tonsillar enlargement. No pharyngeal erythema or exudate. No stridor.  NODES: No cervical, axillary or inguinal lymph node enlargement.  CHEST: Lungs clear to auscultation except scattered ronchi   CARDIOVASCULAR: Normal S1, S2. No rubs, murmurs or gallops.  ABDOMEN: Bowel sounds normal. Not distended. Soft. No tenderness or masses.  MUSCULOSKELETAL: Tender bilateral paracervical paralumbar neg SLR, tender knees and shoulders lt injected     NEUROLOGIC: Cranial Nerves: II-XII grossly intact.  Motor: 5/5 strength major flexors/extensors.  DTR's: Knees, Ankles 2+ and equal bilaterally; downgoing toes.  Sensory: Decr sensations hands  Gait & Posture: Antalgic gait     Impression: Cerv stenosis  Shoulder arthropathy  Radiculopathy  Lt meniscal tear   HBP  HLP     Plan:   Rev med recs   Rev pain management    Reviewed meds ,pain management ,risks benefits meds

## 2018-07-27 ENCOUNTER — PATIENT OUTREACH (OUTPATIENT)
Dept: ADMINISTRATIVE | Facility: HOSPITAL | Age: 63
End: 2018-07-27

## 2018-07-30 ENCOUNTER — OFFICE VISIT (OUTPATIENT)
Dept: FAMILY MEDICINE | Facility: CLINIC | Age: 63
End: 2018-07-30
Payer: MEDICARE

## 2018-07-30 VITALS
WEIGHT: 218.38 LBS | TEMPERATURE: 98 F | BODY MASS INDEX: 29.58 KG/M2 | HEART RATE: 61 BPM | HEIGHT: 72 IN | SYSTOLIC BLOOD PRESSURE: 130 MMHG | DIASTOLIC BLOOD PRESSURE: 83 MMHG

## 2018-07-30 DIAGNOSIS — M51.36 DDD (DEGENERATIVE DISC DISEASE), LUMBAR: ICD-10-CM

## 2018-07-30 DIAGNOSIS — M48.02 CERVICAL STENOSIS OF SPINE: ICD-10-CM

## 2018-07-30 DIAGNOSIS — M54.12 CERVICAL RADICULOPATHY: ICD-10-CM

## 2018-07-30 DIAGNOSIS — Z11.59 ENCOUNTER FOR HEPATITIS C SCREENING TEST FOR LOW RISK PATIENT: Primary | ICD-10-CM

## 2018-07-30 DIAGNOSIS — M54.16 LUMBAR RADICULOPATHY: ICD-10-CM

## 2018-07-30 PROCEDURE — 99999 PR PBB SHADOW E&M-EST. PATIENT-LVL III: CPT | Mod: PBBFAC,,, | Performed by: FAMILY MEDICINE

## 2018-07-30 PROCEDURE — 99213 OFFICE O/P EST LOW 20 MIN: CPT | Mod: S$PBB,,, | Performed by: FAMILY MEDICINE

## 2018-07-30 PROCEDURE — 99213 OFFICE O/P EST LOW 20 MIN: CPT | Mod: PBBFAC,PO | Performed by: FAMILY MEDICINE

## 2018-07-30 RX ORDER — HYDROCODONE BITARTRATE AND ACETAMINOPHEN 10; 325 MG/1; MG/1
1 TABLET ORAL 4 TIMES DAILY
Qty: 360 TABLET | Refills: 0 | Status: SHIPPED | OUTPATIENT
Start: 2018-07-30 | End: 2018-10-25 | Stop reason: SDUPTHER

## 2018-07-30 NOTE — PROGRESS NOTES
The patient presents today fu chronic pain management generally tolerating w HC 10    Hx cervical stenosis and lumbar disc disease w chonic pain and radiculopathy. He has DJD todd bilateral knees. Lt knee is getting worse  HBP and HLP controlled      Past Medical History:  Past Medical History:   Diagnosis Date    Anticoagulant long-term use     aspirn    Asthma     Cervical stenosis of spine     DDD (degenerative disc disease), lumbar     Depression     DJD (degenerative joint disease)     Hyperlipidemia     Hypertension     Intervertebral disc protrusion     Skin abnormalities      Past Surgical History:   Procedure Laterality Date    cervical injection      COLONOSCOPY      MULTIPLE TOOTH EXTRACTIONS       Review of patient's allergies indicates:   Allergen Reactions    Ace inhibitors Swelling     Current Outpatient Prescriptions on File Prior to Visit   Medication Sig Dispense Refill    albuterol 90 mcg/actuation inhaler Inhale 2 puffs into the lungs every 6 (six) hours as needed for Wheezing. 54 g 4    amlodipine (NORVASC) 10 MG tablet Take 1 tablet (10 mg total) by mouth once daily. 90 tablet 3    aspirin 81 MG Chew Take 1 tablet (81 mg total) by mouth once daily. 90 tablet 3    atenolol (TENORMIN) 25 MG tablet Take 0.5 tablets (12.5 mg total) by mouth once daily. 90 tablet 3    diltiaZEM (DILACOR XR) 180 MG CDCR Take 1 capsule (180 mg total) by mouth 2 (two) times daily. 180 capsule 3    FLUoxetine (PROZAC) 20 MG capsule       gabapentin (NEURONTIN) 300 MG capsule Take 2 capsules (600 mg total) by mouth 3 (three) times daily. 540 capsule 3    hydrochlorothiazide (HYDRODIURIL) 12.5 MG Tab Take 1 tablet (12.5 mg total) by mouth once daily. 90 tablet 3    hydrocodone-acetaminophen 10-325mg (NORCO)  mg Tab Take 1 tablet by mouth 4 (four) times daily. 360 tablet 0    ibuprofen (ADVIL,MOTRIN) 800 MG tablet Take 1 tablet (800 mg total) by mouth 4 (four) times daily. 360 tablet 3     ketoconazole (NIZORAL) 2 % shampoo Apply topically 3 (three) times a week. 120 mL 3    loratadine (CLARITIN) 10 mg tablet Take 1 tablet (10 mg total) by mouth once daily. 90 tablet 3    LUBRICANT EYE, POLYV ALCOHOL, 1.4 % ophthalmic solution       meloxicam (MOBIC) 15 MG tablet Take 0.5 tablets (7.5 mg total) by mouth once daily. 90 tablet 3    potassium chloride (KLOR-CON) 10 MEQ TbSR Take 2 tablets (20 mEq total) by mouth 2 (two) times daily. 360 tablet 3    simvastatin (ZOCOR) 20 MG tablet Take 1 tablet (20 mg total) by mouth every evening. 90 tablet 3    SYMBICORT 80-4.5 mcg/actuation HFAA 2 puffs daily as needed.       terbinafine HCl (LAMISIL) 1 % cream Apply topically 2 (two) times daily. 42 g 3    travoprost, benzalkonium, (TRAVATAN) 0.004 % ophthalmic solution Place 1 drop into both eyes nightly. 5 mL 3    triamcinolone acetonide 0.1% (KENALOG) 0.1 % Lotn Apply topically 2 (two) times daily. 60 mL 3    white petrolatum-mineral oil (EUCERIN) Crea Apply topically as needed. 454 g 3     No current facility-administered medications on file prior to visit.      Social History     Social History    Marital status:      Spouse name: N/A    Number of children: 3    Years of education: N/A     Occupational History    Not on file.     Social History Main Topics    Smoking status: Current Some Day Smoker     Packs/day: 0.50     Last attempt to quit: 5/12/2015    Smokeless tobacco: Never Used      Comment: smokes once per month    Alcohol use No    Drug use: No    Sexual activity: Not on file     Other Topics Concern    Not on file     Social History Narrative    No narrative on file     History reviewed. No pertinent family history.      ROS:GENERAL: No fever, chills, fatigability or weight loss.  SKIN: No rashes, itching or changes in color or texture of skin.  HEAD: No headaches or recent head trauma.EYES: Visual acuity fine. No photophobia, ocular pain or diplopia.EARS: Denies ear pain,  discharge or vertigo.NOSE: No loss of smell, no epistaxis or postnasal drip.MOUTH & THROAT: No hoarseness or change in voice. No excessive gum bleeding.NODES: Denies swollen glands.  CHEST: Denies CHESTER, cyanosis, wheezing, cough and sputum production.  CARDIOVASCULAR: Denies chest pain, PND, orthopnea or reduced exercise tolerance.  ABDOMEN: Appetite fine. No weight loss. Denies diarrhea, abdominal pain, hematemesis or blood in stool.  URINARY: No flank pain, dysuria or hematuria.  PERIPHERAL VASCULAR: No claudication or cyanosis.  MUSCULOSKELETAL: See above.  NEUROLOGIC: No history of seizures, paralysis, alteration of gait or coordination.  PE:   HEAD: Normocephalic, atraumatic.EYES: PERRL. EOMI.   EARS: TM's intact. Light reflex normal. No retraction or perforation.   NOSE: Mucosa pink. Airway clear.MOUTH & THROAT: No tonsillar enlargement. No pharyngeal erythema or exudate. No stridor.  NODES: No cervical, axillary or inguinal lymph node enlargement.  CHEST: Lungs clear to auscultation except scattered ronchi   CARDIOVASCULAR: Normal S1, S2. No rubs, murmurs or gallops.  ABDOMEN: Bowel sounds normal. Not distended. Soft. No tenderness or masses.  MUSCULOSKELETAL: Tender bilateral paracervical paralumbar neg SLR, tender knees and shoulders lt injected     NEUROLOGIC: Cranial Nerves: II-XII grossly intact.  Motor: 5/5 strength major flexors/extensors.  DTR's: Knees, Ankles 2+ and equal bilaterally; downgoing toes.  Sensory: Decr sensations hands  Gait & Posture: Antalgic gait     Impression: Cerv stenosis  Shoulder arthropathy  Radiculopathy  Lt meniscal tear   HBP  HLP     Plan:   Rev med recs   Rev pain management    Reviewed meds ,pain management ,risks benefits meds

## 2018-10-08 PROCEDURE — 12002 RPR S/N/AX/GEN/TRNK2.6-7.5CM: CPT

## 2018-10-08 PROCEDURE — 12042 INTMD RPR N-HF/GENIT2.6-7.5: CPT

## 2018-10-08 PROCEDURE — 99284 EMERGENCY DEPT VISIT MOD MDM: CPT | Mod: 25

## 2018-10-09 ENCOUNTER — HOSPITAL ENCOUNTER (EMERGENCY)
Facility: HOSPITAL | Age: 63
Discharge: HOME | End: 2018-10-09
Attending: EMERGENCY MEDICINE
Payer: MEDICARE

## 2018-10-09 VITALS
HEART RATE: 66 BPM | OXYGEN SATURATION: 99 % | TEMPERATURE: 99 F | HEIGHT: 72 IN | RESPIRATION RATE: 18 BRPM | BODY MASS INDEX: 29.48 KG/M2 | WEIGHT: 217.69 LBS | DIASTOLIC BLOOD PRESSURE: 105 MMHG | SYSTOLIC BLOOD PRESSURE: 185 MMHG
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
## 2018-10-09 DIAGNOSIS — S61.412A LACERATION OF PALM, LEFT, INITIAL ENCOUNTER: ICD-10-CM

## 2018-10-09 DIAGNOSIS — S61.452A DOG BITE OF LEFT HAND, INITIAL ENCOUNTER: Primary | ICD-10-CM

## 2018-10-09 DIAGNOSIS — Z23 TETANUS-DIPHTHERIA VACCINATION ADMINISTERED AT CURRENT VISIT: ICD-10-CM

## 2018-10-09 DIAGNOSIS — R03.0 ELEVATED BLOOD PRESSURE READING: ICD-10-CM

## 2018-10-09 DIAGNOSIS — M79.642 HAND PAIN, LEFT: ICD-10-CM

## 2018-10-09 PROCEDURE — 90715 TDAP VACCINE 7 YRS/> IM: CPT | Performed by: NURSE PRACTITIONER

## 2018-10-09 PROCEDURE — 63600175 PHARM REV CODE 636 W HCPCS: Performed by: NURSE PRACTITIONER

## 2018-10-09 PROCEDURE — 25000003 PHARM REV CODE 250: Performed by: NURSE PRACTITIONER

## 2018-10-09 PROCEDURE — 90471 IMMUNIZATION ADMIN: CPT | Performed by: NURSE PRACTITIONER

## 2018-10-09 PROCEDURE — 12042 INTMD RPR N-HF/GENIT2.6-7.5: CPT

## 2018-10-09 RX ORDER — AMOXICILLIN AND CLAVULANATE POTASSIUM 875; 125 MG/1; MG/1
1 TABLET, FILM COATED ORAL
Status: COMPLETED | OUTPATIENT
Start: 2018-10-09 | End: 2018-10-09

## 2018-10-09 RX ORDER — LIDOCAINE HYDROCHLORIDE 10 MG/ML
10 INJECTION INFILTRATION; PERINEURAL
Status: COMPLETED | OUTPATIENT
Start: 2018-10-09 | End: 2018-10-09

## 2018-10-09 RX ORDER — AMOXICILLIN AND CLAVULANATE POTASSIUM 875; 125 MG/1; MG/1
1 TABLET, FILM COATED ORAL 2 TIMES DAILY
Qty: 14 TABLET | Refills: 0 | Status: SHIPPED | OUTPATIENT
Start: 2018-10-09 | End: 2019-01-22

## 2018-10-09 RX ORDER — MUPIROCIN 20 MG/G
OINTMENT TOPICAL 3 TIMES DAILY
Qty: 1 TUBE | Refills: 0 | Status: SHIPPED | OUTPATIENT
Start: 2018-10-09

## 2018-10-09 RX ORDER — TRAMADOL HYDROCHLORIDE 50 MG/1
50 TABLET ORAL EVERY 6 HOURS PRN
Qty: 12 TABLET | Refills: 0 | Status: SHIPPED | OUTPATIENT
Start: 2018-10-09 | End: 2018-10-19

## 2018-10-09 RX ADMIN — BACITRACIN, NEOMYCIN, POLYMYXIN B 1 EACH: 400; 3.5; 5 OINTMENT TOPICAL at 01:10

## 2018-10-09 RX ADMIN — AMOXICILLIN AND CLAVULANATE POTASSIUM 1 TABLET: 875; 125 TABLET, FILM COATED ORAL at 02:10

## 2018-10-09 RX ADMIN — LIDOCAINE HYDROCHLORIDE 10 ML: 10 INJECTION, SOLUTION INFILTRATION; PERINEURAL at 02:10

## 2018-10-09 RX ADMIN — CLOSTRIDIUM TETANI TOXOID ANTIGEN (FORMALDEHYDE INACTIVATED), CORYNEBACTERIUM DIPHTHERIAE TOXOID ANTIGEN (FORMALDEHYDE INACTIVATED), BORDETELLA PERTUSSIS TOXOID ANTIGEN (GLUTARALDEHYDE INACTIVATED), BORDETELLA PERTUSSIS FILAMENTOUS HEMAGGLUTININ ANTIGEN (FORMALDEHYDE INACTIVATED), BORDETELLA PERTUSSIS PERTACTIN ANTIGEN, AND BORDETELLA PERTUSSIS FIMBRIAE 2/3 ANTIGEN 0.5 ML: 5; 2; 2.5; 5; 3; 5 INJECTION, SUSPENSION INTRAMUSCULAR at 01:10

## 2018-10-09 NOTE — ED NOTES
Pt states that his dog was about to attack a stranger in his backyard, but in attempt to prevent the stranger from getting attacked, the pt grabbed his dog, causing him to get bit by his dog. Pt states that he does not want to report his dog.

## 2018-10-09 NOTE — ED PROVIDER NOTES
HISTORY     Chief Complaint   Patient presents with    Animal Bite     pt states that his dog started chasing after someone and he tried to pull dog causing him to get dog bite to L hand     Review of patient's allergies indicates:   Allergen Reactions    Ace inhibitors Swelling        HPI   The history is provided by the patient.   Animal Bite    The incident occurred just prior to arrival. The incident occurred at home. There is an injury to the left wrist and left hand. The pain is at a severity of 4/10. It is unlikely that a foreign body is present. Pertinent negatives include no chest pain, no nausea and no weakness. There have been no prior injuries to these areas. He is right-handed. His tetanus status is unknown. He has received no recent medical care.        PCP: Champ Kramer MD     Past Medical History:  Past Medical History:   Diagnosis Date    Anticoagulant long-term use     aspirn    Asthma     Cervical stenosis of spine     DDD (degenerative disc disease), lumbar     Depression     DJD (degenerative joint disease)     Hyperlipidemia     Hypertension     Intervertebral disc protrusion     Skin abnormalities         Past Surgical History:  Past Surgical History:   Procedure Laterality Date    cervical injection      COLONOSCOPY      COLONOSCOPY N/A 6/8/2015    [facility]    INJECTION-STEROID-EPIDURAL-CERVICAL N/A 9/12/2017    [facility]    INJECTION-STEROID-EPIDURAL-CERVICAL N/A 7/24/2017    [facility]    INJECTION-STEROID-EPIDURAL-CERVICAL N/A 8/22/2016    [facility]    INJECTION-STEROID-EPIDURAL-LUMBAR L5/S1 N/A 1/15/2018    [facility]    INJECTION-STEROID-EPIDURAL-LUMBAR L5/S1 N/A 10/31/2017    [facility]    MULTIPLE TOOTH EXTRACTIONS          Family  History:  No family history on file.     Social History:  Social History     Tobacco Use    Smoking status: Current Some Day Smoker     Packs/day: 0.50     Last attempt to quit: 5/12/2015     Years since quitting: 3.4    Smokeless tobacco: Never Used    Tobacco comment: smokes once per month   Substance and Sexual Activity    Alcohol use: No    Drug use: No    Sexual activity: Not on file         ROS   Review of Systems   Constitutional: Negative for fever.   HENT: Negative for sore throat.    Respiratory: Negative for shortness of breath.    Cardiovascular: Negative for chest pain.   Gastrointestinal: Negative for nausea.   Genitourinary: Negative for dysuria.   Musculoskeletal: Negative for back pain.   Skin: Negative for rash.        Left palm and wrist injury from dog bite    Neurological: Negative for weakness.   Hematological: Does not bruise/bleed easily.       PHYSICAL EXAM     Initial Vitals [10/08/18 2308]   BP Pulse Resp Temp SpO2   (!) 169/95 70 20 99 °F (37.2 °C) 98 %      MAP       --           Physical Exam    Constitutional: He appears well-developed and well-nourished. No distress.   HENT:   Head: Normocephalic and atraumatic.   Eyes: Conjunctivae are normal. Pupils are equal, round, and reactive to light.   Neck: Normal range of motion. Neck supple.   Cardiovascular: Normal rate, regular rhythm and normal heart sounds.   Pulmonary/Chest: Breath sounds normal.   Abdominal: Soft. Bowel sounds are normal.   Musculoskeletal: Normal range of motion.        Hands:  Neurological: He is alert and oriented to person, place, and time. No cranial nerve deficit.   Skin: Skin is warm and dry.   Psychiatric: He has a normal mood and affect.          ED COURSE   Lac Repair  Date/Time: 10/9/2018 2:49 AM  Performed by: Vickey Oakley NP  Authorized by: Birgit Macdonald Do, MD   Body area: upper extremity  Location details: left hand  Laceration length: 3.5 cm  Contamination: The wound is contaminated.  Foreign  bodies: no foreign bodies  Tendon involvement: none  Nerve involvement: none  Vascular damage: no  Anesthesia: local infiltration    Anesthesia:  Local Anesthetic: lidocaine 1% without epinephrine  Preparation: Patient was prepped and draped in the usual sterile fashion.  Irrigation solution: saline  Irrigation method: syringe  Amount of cleaning: extensive  Debridement: none  Degree of undermining: none  Skin closure: 4-0 nylon  Number of sutures: 3  Technique: simple  Approximation: loose  Approximation difficulty: simple  Dressing: antibiotic ointment, dressing applied and non-stick sterile dressing  Patient tolerance: Patient tolerated the procedure well with no immediate complications        ED ONGOING VITALS:  Vitals:    10/08/18 2308 10/09/18 0220   BP: (!) 169/95 (!) 185/105   Pulse: 70 66   Resp: 20 18   Temp: 99 °F (37.2 °C) 98.9 °F (37.2 °C)   TempSrc: Oral Oral   SpO2: 98% 99%   Weight: 98.8 kg (217 lb 11.3 oz)    Height: 6' (1.829 m)          ABNORMAL LAB VALUES:  Labs Reviewed - No data to display      ALL LAB VALUES:        RADIOLOGY STUDIES:  Imaging Results          X-Ray Hand 3 view Left (Final result)  Result time 10/09/18 07:30:30    Final result by KATHRYN Powell Sr., MD (10/09/18 07:30:30)                 Impression:      Normal study.      Electronically signed by: Migel Powell MD  Date:    10/09/2018  Time:    07:30             Narrative:    EXAMINATION:  XR HAND COMPLETE 3 VIEW LEFT    CLINICAL HISTORY:  dog bite hand;    COMPARISON:  None    FINDINGS:  There is no fracture. There is no dislocation.  There is no radiopaque foreign body visualized.                                          The above vital signs and test results have been reviewed by the emergency provider.     ED Medications:  Medications   lidocaine HCL 10 mg/ml (1%) injection 10 mL (10 mLs Infiltration Given by Other 10/9/18 7160)   Tdap vaccine injection 0.5 mL (0.5 mLs Intramuscular Given 10/9/18 1347)    neomycin-bacitracnZn-polymyxnB packet (1 each Topical (Top) Given 10/9/18 0127)   amoxicillin-clavulanate 875-125mg per tablet 1 tablet (1 tablet Oral Given 10/9/18 0209)       Discharge Medications:  This SmartLink is deprecated. Use AVSMEDLIST instead to display the medication list for a patient.   Follow-up Information     Champ Kramer MD In 14 days.    Specialty:  Family Medicine  Why:  For suture removal  Contact information:  [address] [facility] [address] [phone]             [facility] - .    Specialty:  Emergency Medicine  Why:  As needed, If symptoms worsen, For wound re-check  Contact information:  [address] [facility] [address] [phone]                I discussed with patient and/or family/caretaker that evaluation in the ED does not suggest any emergent or life threatening medical conditions requiring immediate intervention beyond what was provided in the ED, and I believe patient is safe for discharge. Regardless, an unremarkable evaluation in the ED does not preclude the development or presence of a serious or life threatening condition. As such, patient was instructed to return immediately for any worsening or change in current symptoms.    I discussed wound care precautions with patient and/or family/caretaker; specifically that all wounds have risk of infection despite efforts to cleanse and debride the wound; and there is a risk of an occult foreign body (and thus increased risk of infection) despite a negative examination.  I discussed with patient need to return for any signs of infection, specifically redness, increased pain, fever, drainage of pus, or any concern, immediately.    Pre-hypertension/Hypertension: The pt has been informed that they may have pre-hypertension or hypertension based on a blood pressure reading in the ED. I recommend that the pt call the PCP listed on their discharge instructions or a physician of their  choice this week to arrange f/u for further evaluation of possible pre-hypertension or hypertension.       MEDICAL DECISION MAKING         Vaccine Counseling: Tetanus, diphtheria, and pertussis are very serious diseases. Tdap vaccine can protect us from these diseases.  And, Tdap vaccine given to pregnant women can protect  babies against pertussis.These diseases are caused by bacteria. Diphtheria and pertussis are spread from person to person through secretions from coughing or sneezing. Tetanus enters the body through cuts, scratches, or wounds. With any medicine, including vaccines, there is a chance of side effects. These are usually mild and go away on their own. Serious reactions are also possible but are rare. Return to ER or see PCP if any adverse reaction occurs immediately            CLINICAL IMPRESSION       ICD-10-CM ICD-9-CM   1. Dog bite of left hand, initial encounter S61.452A 882.0    W54.0XXA E906.0   2. Laceration of palm, left, initial encounter S61.412A 882.0   3. Hand pain, left M79.642 729.5   4. Tetanus-diphtheria vaccination administered at current visit Z23 V49.89   5. Elevated blood pressure reading R03.0 796.2       Disposition:   Disposition: Discharged  Condition: Stable         Vickey Oakley NP  10/09/18 8053

## 2018-10-09 NOTE — ED NOTES
ABHI Oakley notified of BP. NP has instructed pt to go home and take nightly BP medicine of amlodipine 10 mg. Pt verbalizes understanding. NAD noted. AAO x 3. Ambulatory. Gait steady. Will continue with discharge.

## 2018-10-11 ENCOUNTER — PATIENT OUTREACH (OUTPATIENT)
Dept: ADMINISTRATIVE | Facility: HOSPITAL | Age: 63
End: 2018-10-11

## 2018-10-25 ENCOUNTER — OFFICE VISIT (OUTPATIENT)
Dept: FAMILY MEDICINE | Facility: CLINIC | Age: 63
End: 2018-10-25
Payer: MEDICARE

## 2018-10-25 VITALS
DIASTOLIC BLOOD PRESSURE: 77 MMHG | TEMPERATURE: 98 F | HEART RATE: 64 BPM | WEIGHT: 213.81 LBS | HEIGHT: 72 IN | BODY MASS INDEX: 28.96 KG/M2 | SYSTOLIC BLOOD PRESSURE: 138 MMHG

## 2018-10-25 DIAGNOSIS — M54.16 LUMBAR RADICULOPATHY: ICD-10-CM

## 2018-10-25 DIAGNOSIS — Z23 NEED FOR PROPHYLACTIC VACCINATION AND INOCULATION AGAINST INFLUENZA: ICD-10-CM

## 2018-10-25 DIAGNOSIS — Z11.59 ENCOUNTER FOR HEPATITIS C SCREENING TEST FOR LOW RISK PATIENT: ICD-10-CM

## 2018-10-25 DIAGNOSIS — M48.02 CERVICAL STENOSIS OF SPINE: Primary | ICD-10-CM

## 2018-10-25 DIAGNOSIS — M51.36 DDD (DEGENERATIVE DISC DISEASE), LUMBAR: ICD-10-CM

## 2018-10-25 DIAGNOSIS — M54.12 CERVICAL RADICULOPATHY: ICD-10-CM

## 2018-10-25 PROCEDURE — 90662 IIV NO PRSV INCREASED AG IM: CPT | Mod: PBBFAC,PO

## 2018-10-25 PROCEDURE — 99213 OFFICE O/P EST LOW 20 MIN: CPT | Mod: S$PBB,,, | Performed by: FAMILY MEDICINE

## 2018-10-25 PROCEDURE — 99999 PR PBB SHADOW E&M-EST. PATIENT-LVL III: CPT | Mod: PBBFAC,,, | Performed by: FAMILY MEDICINE

## 2018-10-25 PROCEDURE — 99213 OFFICE O/P EST LOW 20 MIN: CPT | Mod: PBBFAC,PO,25 | Performed by: FAMILY MEDICINE

## 2018-10-25 RX ORDER — HYDROCODONE BITARTRATE AND ACETAMINOPHEN 10; 325 MG/1; MG/1
1 TABLET ORAL 4 TIMES DAILY
Qty: 360 TABLET | Refills: 0 | Status: SHIPPED | OUTPATIENT
Start: 2018-10-25 | End: 2019-01-22 | Stop reason: SDUPTHER

## 2018-10-25 NOTE — PROGRESS NOTES
The patient presents today fu chronic pain management generally tolerating w HC 10    Hx cervical stenosis and lumbar disc disease w chonic pain and radiculopathy. He has DJD todd bilateral knees. Lt knee is getting worse  HBP and HLP controlled    Recent dog bite healing   Past Medical History:  Past Medical History:   Diagnosis Date    Anticoagulant long-term use     aspirn    Asthma     Cervical stenosis of spine     DDD (degenerative disc disease), lumbar     Depression     DJD (degenerative joint disease)     Hyperlipidemia     Hypertension     Intervertebral disc protrusion     Skin abnormalities      Past Surgical History:   Procedure Laterality Date    cervical injection      COLONOSCOPY      COLONOSCOPY N/A 6/8/2015    [facility]    INJECTION-STEROID-EPIDURAL-CERVICAL N/A 9/12/2017    [facility]    INJECTION-STEROID-EPIDURAL-CERVICAL N/A 7/24/2017    [facility]    INJECTION-STEROID-EPIDURAL-CERVICAL N/A 8/22/2016    [facility]    INJECTION-STEROID-EPIDURAL-LUMBAR L5/S1 N/A 1/15/2018    [facility]    INJECTION-STEROID-EPIDURAL-LUMBAR L5/S1 N/A 10/31/2017    [facility]    MULTIPLE TOOTH EXTRACTIONS       Review of patient's allergies indicates:   Allergen Reactions    Ace inhibitors Swelling     Current Outpatient Medications on File Prior to Visit   Medication Sig Dispense Refill    albuterol 90 mcg/actuation inhaler Inhale 2 puffs into the lungs every 6 (six) hours as needed for Wheezing. 54 g 4    amlodipine (NORVASC) 10 MG tablet Take 1 tablet (10 mg total) by mouth once daily. 90 tablet 3    amoxicillin-clavulanate 875-125mg (AUGMENTIN) 875-125 mg per tablet Take 1 tablet by mouth 2 (two) times daily. 14 tablet 0    aspirin 81 MG Chew Take 1 tablet (81 mg total) by  mouth once daily. 90 tablet 3    atenolol (TENORMIN) 25 MG tablet Take 0.5 tablets (12.5 mg total) by mouth once daily. 90 tablet 3    diltiaZEM (DILACOR XR) 180 MG CDCR Take 1 capsule (180 mg total) by mouth 2 (two) times daily. 180 capsule 3    FLUoxetine (PROZAC) 20 MG capsule       gabapentin (NEURONTIN) 300 MG capsule Take 2 capsules (600 mg total) by mouth 3 (three) times daily. 540 capsule 3    hydrochlorothiazide (HYDRODIURIL) 12.5 MG Tab Take 1 tablet (12.5 mg total) by mouth once daily. 90 tablet 3    HYDROcodone-acetaminophen (NORCO)  mg per tablet Take 1 tablet by mouth 4 (four) times daily. 360 tablet 0    ibuprofen (ADVIL,MOTRIN) 800 MG tablet Take 1 tablet (800 mg total) by mouth 4 (four) times daily. 360 tablet 3    ketoconazole (NIZORAL) 2 % shampoo Apply topically 3 (three) times a week. 120 mL 3    loratadine (CLARITIN) 10 mg tablet Take 1 tablet (10 mg total) by mouth once daily. 90 tablet 3    LUBRICANT EYE, POLYV ALCOHOL, 1.4 % ophthalmic solution       meloxicam (MOBIC) 15 MG tablet Take 0.5 tablets (7.5 mg total) by mouth once daily. 90 tablet 3    mupirocin (BACTROBAN) 2 % ointment Apply topically 3 (three) times daily. 1 Tube 0    potassium chloride (KLOR-CON) 10 MEQ TbSR Take 2 tablets (20 mEq total) by mouth 2 (two) times daily. 360 tablet 3    simvastatin (ZOCOR) 20 MG tablet Take 1 tablet (20 mg total) by mouth every evening. 90 tablet 3    SYMBICORT 80-4.5 mcg/actuation HFAA 2 puffs daily as needed.       terbinafine HCl (LAMISIL) 1 % cream Apply topically 2 (two) times daily. 42 g 3    travoprost, benzalkonium, (TRAVATAN) 0.004 % ophthalmic solution Place 1 drop into both eyes nightly. 5 mL 3    triamcinolone acetonide 0.1% (KENALOG) 0.1 % Lotn Apply topically 2 (two) times daily. 60 mL 3    white petrolatum-mineral oil (EUCERIN) Crea Apply topically as needed. 454 g 3     No current facility-administered medications on file prior to visit.      Social History      Socioeconomic History    Marital status:      Spouse name: Not on file    Number of children: 3    Years of education: Not on file    Highest education level: Not on file   Social Needs    Financial resource strain: Not on file    Food insecurity - worry: Not on file    Food insecurity - inability: Not on file    Transportation needs - medical: Not on file    Transportation needs - non-medical: Not on file   Occupational History    Not on file   Tobacco Use    Smoking status: Current Some Day Smoker     Packs/day: 0.50     Last attempt to quit: 5/12/2015     Years since quitting: 3.4    Smokeless tobacco: Never Used    Tobacco comment: smokes once per month   Substance and Sexual Activity    Alcohol use: No    Drug use: No    Sexual activity: Not on file   Other Topics Concern    Not on file   Social History Narrative    Not on file     No family history on file.      ROS:GENERAL: No fever, chills, fatigability or weight loss.  SKIN: No rashes, itching or changes in color or texture of skin.  HEAD: No headaches or recent head trauma.EYES: Visual acuity fine. No photophobia, ocular pain or diplopia.EARS: Denies ear pain, discharge or vertigo.NOSE: No loss of smell, no epistaxis or postnasal drip.MOUTH & THROAT: No hoarseness or change in voice. No excessive gum bleeding.NODES: Denies swollen glands.  CHEST: Denies CHESTER, cyanosis, wheezing, cough and sputum production.  CARDIOVASCULAR: Denies chest pain, PND, orthopnea or reduced exercise tolerance.  ABDOMEN: Appetite fine. No weight loss. Denies diarrhea, abdominal pain, hematemesis or blood in stool.  URINARY: No flank pain, dysuria or hematuria.  PERIPHERAL VASCULAR: No claudication or cyanosis.  MUSCULOSKELETAL: See above.  NEUROLOGIC: No history of seizures, paralysis, alteration of gait or coordination.  PE:   HEAD: Normocephalic, atraumatic.EYES: PERRL. EOMI.   EARS: TM's intact. Light reflex normal. No retraction or perforation.    NOSE: Mucosa pink. Airway clear.MOUTH & THROAT: No tonsillar enlargement. No pharyngeal erythema or exudate. No stridor.  NODES: No cervical, axillary or inguinal lymph node enlargement.  CHEST: Lungs clear to auscultation except scattered ronchi   CARDIOVASCULAR: Normal S1, S2. No rubs, murmurs or gallops.  ABDOMEN: Bowel sounds normal. Not distended. Soft. No tenderness or masses.  MUSCULOSKELETAL: Tender bilateral paracervical paralumbar neg SLR, tender knees and shoulders lt injected     Lac healing no redness or dc    NEUROLOGIC: Cranial Nerves: II-XII grossly intact.  Motor: 5/5 strength major flexors/extensors.  DTR's: Knees, Ankles 2+ and equal bilaterally; downgoing toes.  Sensory: Decr sensations hands  Gait & Posture: Antalgic gait     Impression: Cerv stenosis  Shoulder arthropathy  Radiculopathy  Lt meniscal tear   HBP  HLP     Plan:   Rev med recs   Rev pain management    Reviewed meds ,pain management ,risks benefits meds

## 2019-01-17 ENCOUNTER — TELEPHONE (OUTPATIENT)
Dept: ADMINISTRATIVE | Facility: OTHER | Age: 64
End: 2019-01-17

## 2019-01-18 NOTE — TELEPHONE ENCOUNTER
----- Message from Verona Yan sent at 1/17/2019  1:38 PM CST -----  Contact: pt  Pt calling would like to get in to have an injection in his neck ,please as soon as possible..[phone] (home)

## 2019-01-22 ENCOUNTER — OFFICE VISIT (OUTPATIENT)
Dept: FAMILY MEDICINE | Facility: CLINIC | Age: 64
End: 2019-01-22
Payer: MEDICARE

## 2019-01-22 VITALS
HEIGHT: 72 IN | WEIGHT: 217.13 LBS | HEART RATE: 64 BPM | BODY MASS INDEX: 29.41 KG/M2 | SYSTOLIC BLOOD PRESSURE: 128 MMHG | DIASTOLIC BLOOD PRESSURE: 76 MMHG | TEMPERATURE: 98 F

## 2019-01-22 DIAGNOSIS — M51.36 DDD (DEGENERATIVE DISC DISEASE), LUMBAR: ICD-10-CM

## 2019-01-22 DIAGNOSIS — M48.02 CERVICAL STENOSIS OF SPINE: Primary | ICD-10-CM

## 2019-01-22 DIAGNOSIS — M54.12 CERVICAL RADICULOPATHY: ICD-10-CM

## 2019-01-22 DIAGNOSIS — I10 BENIGN ESSENTIAL HTN: ICD-10-CM

## 2019-01-22 DIAGNOSIS — M54.16 LUMBAR RADICULOPATHY: ICD-10-CM

## 2019-01-22 PROCEDURE — 99214 OFFICE O/P EST MOD 30 MIN: CPT | Mod: PBBFAC,PO | Performed by: FAMILY MEDICINE

## 2019-01-22 PROCEDURE — 99214 PR OFFICE/OUTPT VISIT, EST, LEVL IV, 30-39 MIN: ICD-10-PCS | Mod: S$PBB,,, | Performed by: FAMILY MEDICINE

## 2019-01-22 PROCEDURE — 99999 PR PBB SHADOW E&M-EST. PATIENT-LVL IV: CPT | Mod: PBBFAC,,, | Performed by: FAMILY MEDICINE

## 2019-01-22 PROCEDURE — 99999 PR PBB SHADOW E&M-EST. PATIENT-LVL IV: ICD-10-PCS | Mod: PBBFAC,,, | Performed by: FAMILY MEDICINE

## 2019-01-22 PROCEDURE — 99214 OFFICE O/P EST MOD 30 MIN: CPT | Mod: S$PBB,,, | Performed by: FAMILY MEDICINE

## 2019-01-22 RX ORDER — HYDROCODONE BITARTRATE AND ACETAMINOPHEN 10; 325 MG/1; MG/1
1 TABLET ORAL 4 TIMES DAILY
Qty: 360 TABLET | Refills: 0 | Status: SHIPPED | OUTPATIENT
Start: 2019-01-22 | End: 2019-04-18 | Stop reason: SDUPTHER

## 2019-01-22 NOTE — PROGRESS NOTES
The patient presents today fu chronic pain management generally tolerating w HC 10    Hx cervical stenosis and lumbar disc disease w chonic pain and radiculopathy. He has DJD todd bilateral knees. Lt knee is getting worse  HBP and HLP controlled     Past Medical History:  Past Medical History:   Diagnosis Date    Anticoagulant long-term use     aspirn    Asthma     Cervical stenosis of spine     DDD (degenerative disc disease), lumbar     Depression     DJD (degenerative joint disease)     Hyperlipidemia     Hypertension     Intervertebral disc protrusion     Skin abnormalities      Past Surgical History:   Procedure Laterality Date    cervical injection      COLONOSCOPY      COLONOSCOPY N/A 6/8/2015    Performed by [facility]ael [facility]cker [facility] at [facility]    INJECTION-STEROID-EPIDURAL-CERVICAL N/A 9/12/2017    [facility] Phil [facility] at [facility]    INJECTION-STEROID-EPIDURAL-CERVICAL N/A 7/24/2017    [facility]    INJECTION-STEROID-EPIDURAL-CERVICAL N/A 8/22/2016    [facility]    INJECTION-STEROID-EPIDURAL-LUMBAR L5/S1 N/A 1/15/2018    [facility]    INJECTION-STEROID-EPIDURAL-LUMBAR L5/S1 N/A 10/31/2017    [facility]    MULTIPLE TOOTH EXTRACTIONS       Review of patient's allergies indicates:   Allergen Reactions    Ace inhibitors Swelling     Current Outpatient Medications on File Prior to Visit   Medication Sig Dispense Refill    albuterol 90 mcg/actuation inhaler Inhale 2 puffs into the lungs every 6 (six) hours as needed for Wheezing. 54 g 4    amlodipine (NORVASC) 10 MG tablet Take 1 tablet (10 mg total) by mouth once daily. 90 tablet 3    amoxicillin-clavulanate 875-125mg (AUGMENTIN) 875-125 mg per tablet Take 1 tablet by mouth 2 (two) times daily. 14 tablet 0    aspirin 81 MG Chew Take 1 tablet (81 mg total) by mouth once daily. 90 tablet  3    atenolol (TENORMIN) 25 MG tablet Take 0.5 tablets (12.5 mg total) by mouth once daily. 90 tablet 3    diltiaZEM (DILACOR XR) 180 MG CDCR Take 1 capsule (180 mg total) by mouth 2 (two) times daily. 180 capsule 3    FLUoxetine (PROZAC) 20 MG capsule       gabapentin (NEURONTIN) 300 MG capsule Take 2 capsules (600 mg total) by mouth 3 (three) times daily. 540 capsule 3    hydrochlorothiazide (HYDRODIURIL) 12.5 MG Tab Take 1 tablet (12.5 mg total) by mouth once daily. 90 tablet 3    HYDROcodone-acetaminophen (NORCO)  mg per tablet Take 1 tablet by mouth 4 (four) times daily. 360 tablet 0    ibuprofen (ADVIL,MOTRIN) 800 MG tablet Take 1 tablet (800 mg total) by mouth 4 (four) times daily. 360 tablet 3    ketoconazole (NIZORAL) 2 % shampoo Apply topically 3 (three) times a week. 120 mL 3    loratadine (CLARITIN) 10 mg tablet Take 1 tablet (10 mg total) by mouth once daily. 90 tablet 3    LUBRICANT EYE, POLYV ALCOHOL, 1.4 % ophthalmic solution       meloxicam (MOBIC) 15 MG tablet Take 0.5 tablets (7.5 mg total) by mouth once daily. 90 tablet 3    mupirocin (BACTROBAN) 2 % ointment Apply topically 3 (three) times daily. 1 Tube 0    potassium chloride (KLOR-CON) 10 MEQ TbSR Take 2 tablets (20 mEq total) by mouth 2 (two) times daily. 360 tablet 3    simvastatin (ZOCOR) 20 MG tablet Take 1 tablet (20 mg total) by mouth every evening. 90 tablet 3    SYMBICORT 80-4.5 mcg/actuation HFAA 2 puffs daily as needed.       terbinafine HCl (LAMISIL) 1 % cream Apply topically 2 (two) times daily. 42 g 3    travoprost, benzalkonium, (TRAVATAN) 0.004 % ophthalmic solution Place 1 drop into both eyes nightly. 5 mL 3    triamcinolone acetonide 0.1% (KENALOG) 0.1 % Lotn Apply topically 2 (two) times daily. 60 mL 3    white petrolatum-mineral oil (EUCERIN) Crea Apply topically as needed. 454 g 3     No current facility-administered medications on file prior to visit.      Social History     Socioeconomic History     Marital status:      Spouse name: Not on file    Number of children: 3    Years of education: Not on file    Highest education level: Not on file   Social Needs    Financial resource strain: Not on file    Food insecurity - worry: Not on file    Food insecurity - inability: Not on file    Transportation needs - medical: Not on file    Transportation needs - non-medical: Not on file   Occupational History    Not on file   Tobacco Use    Smoking status: Current Some Day Smoker     Packs/day: 0.50     Last attempt to quit: 5/12/2015     Years since quitting: 3.7    Smokeless tobacco: Never Used    Tobacco comment: smokes once per month   Substance and Sexual Activity    Alcohol use: No    Drug use: No    Sexual activity: Not on file   Other Topics Concern    Not on file   Social History Narrative    Not on file     No family history on file.      ROS:GENERAL: No fever, chills, fatigability or weight loss.  SKIN: No rashes, itching or changes in color or texture of skin.  HEAD: No headaches or recent head trauma.EYES: Visual acuity fine. No photophobia, ocular pain or diplopia.EARS: Denies ear pain, discharge or vertigo.NOSE: No loss of smell, no epistaxis or postnasal drip.MOUTH & THROAT: No hoarseness or change in voice. No excessive gum bleeding.NODES: Denies swollen glands.  CHEST: Denies CHESTER, cyanosis, wheezing, cough and sputum production.  CARDIOVASCULAR: Denies chest pain, PND, orthopnea or reduced exercise tolerance.  ABDOMEN: Appetite fine. No weight loss. Denies diarrhea, abdominal pain, hematemesis or blood in stool.  URINARY: No flank pain, dysuria or hematuria.  PERIPHERAL VASCULAR: No claudication or cyanosis.  MUSCULOSKELETAL: See above.  NEUROLOGIC: No history of seizures, paralysis, alteration of gait or coordination.  PE:   HEAD: Normocephalic, atraumatic.EYES: PERRL. EOMI.   EARS: TM's intact. Light reflex normal. No retraction or perforation.   NOSE: Mucosa pink. Airway  clear.MOUTH & THROAT: No tonsillar enlargement. No pharyngeal erythema or exudate. No stridor.  NODES: No cervical, axillary or inguinal lymph node enlargement.  CHEST: Lungs clear to auscultation except scattered ronchi   CARDIOVASCULAR: Normal S1, S2. No rubs, murmurs or gallops.  ABDOMEN: Bowel sounds normal. Not distended. Soft. No tenderness or masses.  MUSCULOSKELETAL: Tender bilateral paracervical paralumbar neg SLR, tender knees and shoulders lt injected     Lac healing no redness or dc    NEUROLOGIC: Cranial Nerves: II-XII grossly intact.  Motor: 5/5 strength major flexors/extensors.  DTR's: Knees, Ankles 2+ and equal bilaterally; downgoing toes.  Sensory: Decr sensations hands  Gait & Posture: Antalgic gait     Impression: Cerv stenosis  Shoulder arthropathy  Radiculopathy  Lt meniscal tear   HBP  HLP     Plan:   Rev med recs   Cont current BP tx   Rev pain management    Reviewed meds ,pain management ,risks benefits meds

## 2019-01-24 ENCOUNTER — OFFICE VISIT (OUTPATIENT)
Dept: PAIN MEDICINE | Facility: CLINIC | Age: 64
End: 2019-01-24
Payer: MEDICARE

## 2019-01-24 VITALS
WEIGHT: 218.13 LBS | OXYGEN SATURATION: 99 % | TEMPERATURE: 97 F | DIASTOLIC BLOOD PRESSURE: 77 MMHG | RESPIRATION RATE: 20 BRPM | SYSTOLIC BLOOD PRESSURE: 133 MMHG | BODY MASS INDEX: 29.59 KG/M2 | HEART RATE: 76 BPM

## 2019-01-24 DIAGNOSIS — M54.12 CERVICAL RADICULOPATHY: Primary | ICD-10-CM

## 2019-01-24 DIAGNOSIS — M51.36 DDD (DEGENERATIVE DISC DISEASE), LUMBAR: ICD-10-CM

## 2019-01-24 DIAGNOSIS — M48.062 SPINAL STENOSIS OF LUMBAR REGION WITH NEUROGENIC CLAUDICATION: ICD-10-CM

## 2019-01-24 DIAGNOSIS — M48.02 CERVICAL STENOSIS OF SPINE: ICD-10-CM

## 2019-01-24 PROCEDURE — 99999 PR PBB SHADOW E&M-EST. PATIENT-LVL V: CPT | Mod: PBBFAC,,, | Performed by: PHYSICIAN ASSISTANT

## 2019-01-24 PROCEDURE — 99215 OFFICE O/P EST HI 40 MIN: CPT | Mod: PBBFAC,PN | Performed by: PHYSICIAN ASSISTANT

## 2019-01-24 PROCEDURE — 99214 PR OFFICE/OUTPT VISIT, EST, LEVL IV, 30-39 MIN: ICD-10-PCS | Mod: S$PBB,,, | Performed by: PHYSICIAN ASSISTANT

## 2019-01-24 PROCEDURE — 99214 OFFICE O/P EST MOD 30 MIN: CPT | Mod: S$PBB,,, | Performed by: PHYSICIAN ASSISTANT

## 2019-01-24 PROCEDURE — 99999 PR PBB SHADOW E&M-EST. PATIENT-LVL V: ICD-10-PCS | Mod: PBBFAC,,, | Performed by: PHYSICIAN ASSISTANT

## 2019-01-24 RX ORDER — SODIUM CHLORIDE, SODIUM LACTATE, POTASSIUM CHLORIDE, CALCIUM CHLORIDE 600; 310; 30; 20 MG/100ML; MG/100ML; MG/100ML; MG/100ML
INJECTION, SOLUTION INTRAVENOUS CONTINUOUS
Status: CANCELLED | OUTPATIENT
Start: 2019-02-04

## 2019-01-24 NOTE — PROGRESS NOTES
This note was completed with dictation software and grammatical errors may exist.    CC: Neck pain, back pain    HPI: The patient is a 63-year-old man with a history of hypertension, chronic neck and back pain who presents in referral from [facility] for continued neck and back pain. He returns in follow-up today with neck pain greater than low back pain. Since his last visit [facility] or lumbar spine [facility].  The patient also asked him about an opioid pump and spinal cord stimulation. He states the surgeon also recommended against these due to the possibility of developing keloids.  His main complaint today is left-sided neck pain that radiates to the left trapezius muscle, left shoulder and left upper arm.  He describes the pain as sharp and is significantly worse with looking to the left.  He has improvement with his pain medication that is prescribed by primary care.  He denies weakness, numbness, bladder or bowel incontinence.     Pain intervention history: [facility], he has tried gabapentin, Robaxin, capsaicin cream for his neck.  He has had lumbar epidural steroid injections but nothing for his neck.  He is status post C7-T1 cervical interlaminar epidural steroid injection on 8/22/16 with what sounds like moderate relief.  He is status post C7-T1 cervical interlaminar epidural steroid on 7/24/17 with 25% relief.  He is not interested in doing any physical therapy.  He is status post C7-T1 cervical interlaminar epidural steroid injection on 9/12/17 with what sounds like excellent relief.  He is status post L5/S1 interlaminar epidural steroid injection on 10/31/17 with 30% relief.  He is status post L5/S1 interlaminar epidural steroid injection on 1/15/18 with 40% relief.    ROS: He reports back pain, neck pain.  Balance of review of systems is negative.    Medical, surgical, family and social history  reviewed elsewhere in record.    Medications/Allergies: See med card    Vitals:    01/24/19 0909   BP: 133/77   Pulse: 76   Resp: 20   Temp: 97 °F (36.1 °C)   TempSrc: Oral   SpO2: 99%   Weight: 98.9 kg (218 lb 2.3 oz)   PainSc:   6   PainLoc: Back         Physical exam:  Gen: A and O x3, pleasant, well-groomed  Skin: No rashes or obvious lesions  HEENT: PERRLA, no obvious deformities on ears or in canals.Trachea midline.  CVS: Regular rate and rhythm, normal palpable pulses.  Resp: Clear to auscultation bilaterally, no wheezes or rales.  Abdomen: Soft, NT/ND.  Musculoskeletal:  Antalgic gait due to left knee pain.  Wearing a knee brace.    Neuro:  Upper extremities: 5/5 strength bilaterally   Lower extremities: 5/5 strength bilaterally  Reflexes: Brachioradialis 2+, Bicep 2+, Tricep 2+.  Patellar 2+, Achilles 2+ bilaterally.  Sensory: Intact and symmetrical to light touch and pinprick in C2-T1 dermatomes bilaterally.  Intact and symmetrical to light touch and pinprick in L2-S1 dermatomes bilaterally.    Cervical Spine:  Cervical spine: ROM is moderately limited with left lateral rotation with increased left neck and left trapezius muscle pain. Range of motion is mildly limited with flexion, extension right lateral rotation with mild increased left neck pain.  Spurling's maneuver causes no neck pain to either side.  Myofascial exam: Mild Tenderness to palpation to the left cervical paraspinous and left trapezius muscle.    Lumbar spine:  Lumbar spine: ROM is mildly limited with flexion moderately limited with extension and oblique extension with increased low back pain during each maneuver but especially with extension.  Juma's test causes no increased pain on either side.    Supine straight leg raise causes posterior thigh pain bilaterally.  Internal and external rotation of the hip causes no increased pain on either side.  Myofascial exam: No tenderness to palpation across lumbar paraspinous  muscles.      Imaging:  MRI cervical spine 5/16/16: C1/2 normal.  C2/3 mild facet arthropathy.  At C3/4 mild facet arthropathy.  C4/5 mild small broad-based posterior disc bulge and facet arthrosis resulting in ventral CSF space effacement and mild ventral cord flattening.  At C5/6 there is broad-based disc bulge with focal left paracentral disc protrusion bilateral facet arthrosis and left uncovertebral spurring resulting in severe left neural foraminal narrowing, ventral CSF space effacement and mild ventral cord flattening.  At C6/7 there is broad-based posterior disc bulge and osteophytes bilateral uncovertebral spurring and facet arthropathy resulting in partial ventral CSF space effacement, mild ventral cord flattening and severe bilateral foraminal stenosis.  At C7/T1 there is broad-based posterior disc bulge with bilateral facet arthrosis and left uncovertebral spurring resulting in partial CSF space effacement and ventral cord flattening and severe left neural foraminal stenosis.    CT lumbar spine report 7/15/11  L2-3 mild disc bulge  L3-4 mild bulging of the disc with flattening of the ventral thecal sac resulting in mild narrowing of the foramina  L4-5 mild bulging of the disc is present along with ligamentum flavum hypertrophy combine to result in a moderate degree of canal stenosis with mild foraminal narrowing    MRI lumbar spine 8/25/17  At the T12-L1 normal, mild ligamentous hypertrophy is noted, no significant central canal stenosis or neural foraminal stenosis is noted.  At the L1-L2 level, mild ligamentous hypertrophy appears to be present,  no significant disk bulge, central canal stenosis, or neural foraminal stenosis is noted  At L2-L3 level, broad-based bulge or protrusion appears to be present of 4 mm with asymmetric bulging towards each neural foramen. Mild bilateral neural foraminal stenosis is noted. Mild central canal narrowing is noted to 9 mm. Mild bilateral lateral recess narrowing is  noted.  At L3-L4 level, asymmetric bulging is noted towards the left neural foramen slightly greater than right of approximately 3 mm, mild left greater than right neural foraminal narrowing is noted without significant central canal stenosis. Mild facet arthropathy and ligamentous hypertrophy is noted.  At the L4-L5 level, broad-based protrusion appears to be present centrally of 6 mm with increased T2 signal suggestive of an annular tear. Ligamentous hypertrophy and facet arthropathy appears to be present. Moderate central canal stenosis appears to be present with bunching of the nerve roots and central canal narrowing to 7 mm. Bilateral lateral recess stenosis is noted. Mild/moderate bilateral neural foraminal narrowing is noted slightly greater to the right than left.  At the L5-S1 level, broad-based bulge is noted centrally of 3 mm with increased T2 signal this can be seen with an annular tear, mild left greater than right neural foraminal stenosis appears to be present. Mild central canal narrowing is noted.      Assessment:  The patient is a 63-year-old man with a history of hypertension, chronic neck and back pain who presents in referral from [facility] for continued neck and back pain.   1. Cervical radiculopathy  Vital signs    Place 18-22 [facility] IV     Verify informed consent    Notify physician     Notify physician     Notify physician (specify)    Diet NPO    Case Request Operating Room: Injection-steroid-epidural-cervical    Place in Outpatient    lactated ringers infusion   2. Cervical stenosis of spine     3. DDD (degenerative disc disease), lumbar     4. Spinal stenosis of lumbar region with neurogenic claudication           Plan:  1.  I will schedule patient for a cervical SOLIS to the left.  He has done very well with this in the past.  2.  We discussed repeating a lumbar SOLIS in the future if necessary.  3.  Follow-up in 4 weeks postprocedure sooner as needed.    Greater  than 50% of this 25 min visit was spent on counseling the patient.

## 2019-01-24 NOTE — H&P (VIEW-ONLY)
This note was completed with dictation software and grammatical errors may exist.    CC: Neck pain, back pain    HPI: The patient is a 63-year-old man with a history of hypertension, chronic neck and back pain who presents in referral from [facility] for continued neck and back pain. He returns in follow-up today with neck pain greater than low back pain. Since his last visit he [facility] or lumbar spine [facility].  The patient also asked him about an opioid pump and spinal cord stimulation. He states the surgeon also recommended against these due to the possibility of developing keloids.  His main complaint today is left-sided neck pain that radiates to the left trapezius muscle, left shoulder and left upper arm.  He describes the pain as sharp and is significantly worse with looking to the left.  He has improvement with his pain medication that is prescribed by primary care.  He denies weakness, numbness, bladder or bowel incontinence.     Pain intervention history: [facility], he has tried gabapentin, Robaxin, capsaicin cream for his neck.  He has had lumbar epidural steroid injections but nothing for his neck.  He is status post C7-T1 cervical interlaminar epidural steroid injection on 8/22/16 with what sounds like moderate relief.  He is status post C7-T1 cervical interlaminar epidural steroid on 7/24/17 with 25% relief.  He is not interested in doing any physical therapy.  He is status post C7-T1 cervical interlaminar epidural steroid injection on 9/12/17 with what sounds like excellent relief.  He is status post L5/S1 interlaminar epidural steroid injection on 10/31/17 with 30% relief.  He is status post L5/S1 interlaminar epidural steroid injection on 1/15/18 with 40% relief.    ROS: He reports back pain, neck pain.  Balance of review of systems is negative.    Medical, surgical, family and social history  reviewed elsewhere in record.    Medications/Allergies: See med card    Vitals:    01/24/19 0909   BP: 133/77   Pulse: 76   Resp: 20   Temp: 97 °F (36.1 °C)   TempSrc: Oral   SpO2: 99%   Weight: 98.9 kg (218 lb 2.3 oz)   PainSc:   6   PainLoc: Back         Physical exam:  Gen: A and O x3, pleasant, well-groomed  Skin: No rashes or obvious lesions  HEENT: PERRLA, no obvious deformities on ears or in canals.Trachea midline.  CVS: Regular rate and rhythm, normal palpable pulses.  Resp: Clear to auscultation bilaterally, no wheezes or rales.  Abdomen: Soft, NT/ND.  Musculoskeletal:  Antalgic gait due to left knee pain.  Wearing a knee brace.    Neuro:  Upper extremities: 5/5 strength bilaterally   Lower extremities: 5/5 strength bilaterally  Reflexes: Brachioradialis 2+, Bicep 2+, Tricep 2+.  Patellar 2+, Achilles 2+ bilaterally.  Sensory: Intact and symmetrical to light touch and pinprick in C2-T1 dermatomes bilaterally.  Intact and symmetrical to light touch and pinprick in L2-S1 dermatomes bilaterally.    Cervical Spine:  Cervical spine: ROM is moderately limited with left lateral rotation with increased left neck and left trapezius muscle pain. Range of motion is mildly limited with flexion, extension right lateral rotation with mild increased left neck pain.  Spurling's maneuver causes no neck pain to either side.  Myofascial exam: Mild Tenderness to palpation to the left cervical paraspinous and left trapezius muscle.    Lumbar spine:  Lumbar spine: ROM is mildly limited with flexion moderately limited with extension and oblique extension with increased low back pain during each maneuver but especially with extension.  Juma's test causes no increased pain on either side.    Supine straight leg raise causes posterior thigh pain bilaterally.  Internal and external rotation of the hip causes no increased pain on either side.  Myofascial exam: No tenderness to palpation across lumbar paraspinous  muscles.      Imaging:  MRI cervical spine 5/16/16: C1/2 normal.  C2/3 mild facet arthropathy.  At C3/4 mild facet arthropathy.  C4/5 mild small broad-based posterior disc bulge and facet arthrosis resulting in ventral CSF space effacement and mild ventral cord flattening.  At C5/6 there is broad-based disc bulge with focal left paracentral disc protrusion bilateral facet arthrosis and left uncovertebral spurring resulting in severe left neural foraminal narrowing, ventral CSF space effacement and mild ventral cord flattening.  At C6/7 there is broad-based posterior disc bulge and osteophytes bilateral uncovertebral spurring and facet arthropathy resulting in partial ventral CSF space effacement, mild ventral cord flattening and severe bilateral foraminal stenosis.  At C7/T1 there is broad-based posterior disc bulge with bilateral facet arthrosis and left uncovertebral spurring resulting in partial CSF space effacement and ventral cord flattening and severe left neural foraminal stenosis.    CT lumbar spine report 7/15/11  L2-3 mild disc bulge  L3-4 mild bulging of the disc with flattening of the ventral thecal sac resulting in mild narrowing of the foramina  L4-5 mild bulging of the disc is present along with ligamentum flavum hypertrophy combine to result in a moderate degree of canal stenosis with mild foraminal narrowing    MRI lumbar spine 8/25/17  At the T12-L1 normal, mild ligamentous hypertrophy is noted, no significant central canal stenosis or neural foraminal stenosis is noted.  At the L1-L2 level, mild ligamentous hypertrophy appears to be present,  no significant disk bulge, central canal stenosis, or neural foraminal stenosis is noted  At L2-L3 level, broad-based bulge or protrusion appears to be present of 4 mm with asymmetric bulging towards each neural foramen. Mild bilateral neural foraminal stenosis is noted. Mild central canal narrowing is noted to 9 mm. Mild bilateral lateral recess narrowing is  noted.  At L3-L4 level, asymmetric bulging is noted towards the left neural foramen slightly greater than right of approximately 3 mm, mild left greater than right neural foraminal narrowing is noted without significant central canal stenosis. Mild facet arthropathy and ligamentous hypertrophy is noted.  At the L4-L5 level, broad-based protrusion appears to be present centrally of 6 mm with increased T2 signal suggestive of an annular tear. Ligamentous hypertrophy and facet arthropathy appears to be present. Moderate central canal stenosis appears to be present with bunching of the nerve roots and central canal narrowing to 7 mm. Bilateral lateral recess stenosis is noted. Mild/moderate bilateral neural foraminal narrowing is noted slightly greater to the right than left.  At the L5-S1 level, broad-based bulge is noted centrally of 3 mm with increased T2 signal this can be seen with an annular tear, mild left greater than right neural foraminal stenosis appears to be present. Mild central canal narrowing is noted.      Assessment:  The patient is a 63-year-old man with a history of hypertension, chronic neck and back pain who presents in referral from [facility] for continued neck and back pain.   1. Cervical radiculopathy  Vital signs    Place 18-22 [facility] IV     Verify informed consent    Notify physician     Notify physician     Notify physician (specify)    Diet NPO    Case Request Operating Room: Injection-steroid-epidural-cervical    Place in Outpatient    lactated ringers infusion   2. Cervical stenosis of spine     3. DDD (degenerative disc disease), lumbar     4. Spinal stenosis of lumbar region with neurogenic claudication           Plan:  1.  I will schedule patient for a cervical SOLIS to the left.  He has done very well with this in the past.  2.  We discussed repeating a lumbar SOLIS in the future if necessary.  3.  Follow-up in 4 weeks postprocedure sooner as needed.    Greater  than 50% of this 25 min visit was spent on counseling the patient.

## 2019-02-01 DIAGNOSIS — M50.30 DDD (DEGENERATIVE DISC DISEASE), CERVICAL: Primary | ICD-10-CM

## 2019-02-04 ENCOUNTER — HOSPITAL ENCOUNTER (OUTPATIENT)
Facility: HOSPITAL | Age: 64
Discharge: HOME | End: 2019-02-04
Attending: ANESTHESIOLOGY | Admitting: ANESTHESIOLOGY
Payer: MEDICARE

## 2019-02-04 ENCOUNTER — HOSPITAL ENCOUNTER (OUTPATIENT)
Dept: RADIOLOGY | Facility: HOSPITAL | Age: 64
Discharge: HOME | End: 2019-02-04
Attending: ANESTHESIOLOGY
Payer: MEDICARE

## 2019-02-04 DIAGNOSIS — M54.12 CERVICAL RADICULOPATHY: Primary | ICD-10-CM

## 2019-02-04 DIAGNOSIS — M50.30 DDD (DEGENERATIVE DISC DISEASE), CERVICAL: ICD-10-CM

## 2019-02-04 PROCEDURE — 62321 NJX INTERLAMINAR CRV/THRC: CPT | Mod: ,,, | Performed by: ANESTHESIOLOGY

## 2019-02-04 PROCEDURE — 99152 PR MOD CONSCIOUS SEDATION, SAME PHYS, 5+ YRS, FIRST 15 MIN: ICD-10-PCS | Mod: ,,, | Performed by: ANESTHESIOLOGY

## 2019-02-04 PROCEDURE — 25000003 PHARM REV CODE 250: Mod: PO | Performed by: ANESTHESIOLOGY

## 2019-02-04 PROCEDURE — 62321 PR INJ CERV/THORAC, W/GUIDANCE: ICD-10-PCS | Mod: ,,, | Performed by: ANESTHESIOLOGY

## 2019-02-04 PROCEDURE — 76000 FLUOROSCOPY <1 HR PHYS/QHP: CPT | Mod: TC,PO

## 2019-02-04 PROCEDURE — 25500020 PHARM REV CODE 255: Mod: PO | Performed by: ANESTHESIOLOGY

## 2019-02-04 PROCEDURE — 62321 NJX INTERLAMINAR CRV/THRC: CPT | Mod: PO | Performed by: ANESTHESIOLOGY

## 2019-02-04 PROCEDURE — A4216 STERILE WATER/SALINE, 10 ML: HCPCS | Mod: PO | Performed by: ANESTHESIOLOGY

## 2019-02-04 PROCEDURE — 99152 MOD SED SAME PHYS/QHP 5/>YRS: CPT | Mod: ,,, | Performed by: ANESTHESIOLOGY

## 2019-02-04 PROCEDURE — 63600175 PHARM REV CODE 636 W HCPCS: Mod: PO | Performed by: ANESTHESIOLOGY

## 2019-02-04 RX ORDER — LIDOCAINE HYDROCHLORIDE 10 MG/ML
INJECTION, SOLUTION EPIDURAL; INFILTRATION; INTRACAUDAL; PERINEURAL
Status: DISCONTINUED | OUTPATIENT
Start: 2019-02-04 | End: 2019-02-04 | Stop reason: HOSPADM

## 2019-02-04 RX ORDER — SODIUM CHLORIDE 9 MG/ML
INJECTION, SOLUTION INTRAMUSCULAR; INTRAVENOUS; SUBCUTANEOUS
Status: DISCONTINUED | OUTPATIENT
Start: 2019-02-04 | End: 2019-02-04 | Stop reason: HOSPADM

## 2019-02-04 RX ORDER — METHYLPREDNISOLONE ACETATE 80 MG/ML
INJECTION, SUSPENSION INTRA-ARTICULAR; INTRALESIONAL; INTRAMUSCULAR; SOFT TISSUE
Status: DISCONTINUED | OUTPATIENT
Start: 2019-02-04 | End: 2019-02-04 | Stop reason: HOSPADM

## 2019-02-04 RX ORDER — SODIUM CHLORIDE, SODIUM LACTATE, POTASSIUM CHLORIDE, CALCIUM CHLORIDE 600; 310; 30; 20 MG/100ML; MG/100ML; MG/100ML; MG/100ML
INJECTION, SOLUTION INTRAVENOUS CONTINUOUS
Status: DISCONTINUED | OUTPATIENT
Start: 2019-02-04 | End: 2019-02-04 | Stop reason: HOSPADM

## 2019-02-04 RX ORDER — MIDAZOLAM HYDROCHLORIDE 2 MG/2ML
INJECTION, SOLUTION INTRAMUSCULAR; INTRAVENOUS
Status: DISCONTINUED | OUTPATIENT
Start: 2019-02-04 | End: 2019-02-04 | Stop reason: HOSPADM

## 2019-02-04 RX ORDER — LIDOCAINE HYDROCHLORIDE 10 MG/ML
1 INJECTION INFILTRATION; PERINEURAL ONCE
Status: COMPLETED | OUTPATIENT
Start: 2019-02-04 | End: 2019-02-04

## 2019-02-04 RX ADMIN — SODIUM CHLORIDE, SODIUM LACTATE, POTASSIUM CHLORIDE, AND CALCIUM CHLORIDE: .6; .31; .03; .02 INJECTION, SOLUTION INTRAVENOUS at 12:02

## 2019-02-04 RX ADMIN — LIDOCAINE HYDROCHLORIDE 1 ML: 10 INJECTION, SOLUTION INFILTRATION; PERINEURAL at 12:02

## 2019-02-04 NOTE — DISCHARGE SUMMARY
[facility]  Discharge Note  Short Stay    Admit Date: 2/4/2019    Discharge Date: 2/4/2019    Attending Physician: Phil Ann MD     Discharge Provider: Phil Ann    Diagnoses:  Active Hospital Problems    Diagnosis  POA    *Cervical radiculopathy [M54.12]  Yes      Resolved Hospital Problems   No resolved problems to display.       Discharged Condition: good    Final Diagnoses: Cervical radiculopathy [M54.12]    Disposition: Home or Self Care    Hospital Course: no complications, uneventful    Outcome of Hospitalization, Treatment, Procedure, or Surgery:  Patient was admitted for outpatient procedure. The patient underwent procedure without complications and are discharged home    Follow up/Patient Instructions:  Follow up as scheduled in Pain Management clinic in 3-4 weeks/Patient has received instructions and follow up date and time    Medications:  Continue previous medications    Discharge Procedure Orders   Call MD for:  temperature >100.4     Call MD for:  severe uncontrolled pain     Call MD for:  redness, tenderness, or signs of infection (pain, swelling, redness, odor or green/yellow discharge around incision site)     Call MD for:  severe persistent headache     No dressing needed         Discharge Procedure Orders (must include Diet, Follow-up, Activity):   Discharge Procedure Orders (must include Diet, Follow-up, Activity)   Call MD for:  temperature >100.4     Call MD for:  severe uncontrolled pain     Call MD for:  redness, tenderness, or signs of infection (pain, swelling, redness, odor or green/yellow discharge around incision site)     Call MD for:  severe persistent headache     No dressing needed

## 2019-02-04 NOTE — PLAN OF CARE
VSS. Tolerated PO fluids. Discharge instructions given and explained. Pain at acceptable level. Patient requesting to be discharged home.

## 2019-02-04 NOTE — DISCHARGE INSTRUCTIONS
Recovery After Procedural Sedation (Adult)  You have been given medicine by vein to make you sleep during your surgery. This may have included both a pain medicine and sleeping medicine. Most of the effects have worn off. But you may still have some drowsiness for the next 6 to 8 hours.  Home care  Follow these guidelines when you get home:  · For the next 8 hours, you should be watched by a responsible adult. This person should make sure your condition is not getting worse.  · Don't drink any alcohol for the next 24 hours.  · Don't drive, operate dangerous machinery, or make important business or personal decisions during the next 24 hours.  Note: Your healthcare provider may tell you not to take any medicine by mouth for pain or sleep in the next 4 hours. These medicines may react with the medicines you were given in the hospital. This could cause a much stronger response than usual.  Follow-up care  Follow up with your healthcare provider if you are not alert and back to your usual level of activity within 12 hours.  When to seek medical advice  Call your healthcare provider right away if any of these occur:  · Drowsiness gets worse  · Weakness or dizziness gets worse  · Repeated vomiting  · You can't be awakened   Date Last Reviewed: 10/18/2016  © 5466-6358 [employer]. [address] [place] [address], [place] [address]. All rights reserved. This information is not intended as a substitute for professional medical care. Always follow your healthcare professional's instructions.      Home care instructions  Apply ice pack to the injection site for 20 minutes periods for the first 24 hrs for soreness/discomfort at injection site DO NOT USE HEAT FOR 24 HOURS  Keep site clean and dry for 24 hours, remove bandaid when desired  Do not drive until tomorrow  Take care when walking after a lumbar injection  Avoid strenuous activities for 2 days  Make take 2 weeks to feel the full effects   Resume home medication  as prescribed today  Resume Aspirin, Plavix, or Coumadin the day after the procedure unless otherwise instructed.    SEE IMMEDIATE MEDICAL HELP FOR:  Severe increase in your usual pain or appearance of new pain  Prolonged or increasing weakness or numbness in the legs or arms  Drainage, redness, active bleeding, or increased swelling at the injection site  Temperature over 100.0 degrees F.  Headache that increases when your head is upright and decreases when you lie flat    CALL 911 OR GO DIRECTLY TO EMERGENCY DEPARTMENT FOR:  Shortness of breath, chest pain, or problems breathing

## 2019-02-04 NOTE — OP NOTE
PROCEDURE DATE: 2/4/2019    Procedure: C7-T1 cervical interlaminar epidural steroid injection under utilizing fluoroscopy.    Diagnosis: Cervical Radiculopathy    POSTOP DIAGNOSIS: SAME    Physician: Phil Ann MD    Medications injected:  Methylprednisone 80mg followed by a slow injection of 4 mL sterile, preservative-free normal saline.    Local anesthetic used: Lidocaine 1%, 4 ml.    Sedation Medications: 4mg versed    Complications:  none    Estimated blood loss: none    Technique:  A time-out was taken to identify patient and procedure prior to starting the procedure.  With the patient laying in a prone position with the neck in a mid-flexed forward position, the area was prepped and draped in the usual sterile fashion using ChloraPrep and a fenestrated drape.  The area was determined under AP fluoroscopic guidance.  Local anesthetic was given using a 25-gauge 1.5 inch needle by raising a wheal and then infiltrating ventrally.  A 3.5 inch 20-gauge Touhy needle was introduced under fluoroscopic guidance to meet the lamina of C7.  The needle was then hinged under the lamina then advanced using loss of resistance technique.  Once the tip of the needle was in the desired position, the contrast dye Omnipaque was injected to determine placement and no uptake.  The steroid was then injected slowly followed by a slow injection of 4 mL of the sterile preservative-free normal saline.  The patient tolerated the procedure well.    The patient was monitored after the procedure and was given post-procedure and discharge instructions to follow at home. The patient was discharged in a stable condition.

## 2019-02-05 VITALS
WEIGHT: 208 LBS | RESPIRATION RATE: 16 BRPM | SYSTOLIC BLOOD PRESSURE: 140 MMHG | BODY MASS INDEX: 28.17 KG/M2 | OXYGEN SATURATION: 100 % | DIASTOLIC BLOOD PRESSURE: 84 MMHG | TEMPERATURE: 98 F | HEART RATE: 65 BPM | HEIGHT: 72 IN

## 2019-03-08 ENCOUNTER — OFFICE VISIT (OUTPATIENT)
Dept: PAIN MEDICINE | Facility: CLINIC | Age: 64
End: 2019-03-08
Payer: MEDICARE

## 2019-03-08 VITALS
BODY MASS INDEX: 28.7 KG/M2 | WEIGHT: 211.63 LBS | SYSTOLIC BLOOD PRESSURE: 140 MMHG | OXYGEN SATURATION: 98 % | TEMPERATURE: 98 F | RESPIRATION RATE: 20 BRPM | DIASTOLIC BLOOD PRESSURE: 77 MMHG | HEART RATE: 64 BPM

## 2019-03-08 DIAGNOSIS — M54.16 LUMBAR RADICULOPATHY: ICD-10-CM

## 2019-03-08 DIAGNOSIS — M48.062 SPINAL STENOSIS OF LUMBAR REGION WITH NEUROGENIC CLAUDICATION: ICD-10-CM

## 2019-03-08 DIAGNOSIS — M25.562 CHRONIC PAIN OF LEFT KNEE: ICD-10-CM

## 2019-03-08 DIAGNOSIS — M48.02 CERVICAL STENOSIS OF SPINE: ICD-10-CM

## 2019-03-08 DIAGNOSIS — M54.12 CERVICAL RADICULOPATHY: Primary | ICD-10-CM

## 2019-03-08 DIAGNOSIS — G89.29 CHRONIC PAIN OF LEFT KNEE: ICD-10-CM

## 2019-03-08 PROCEDURE — 99999 PR PBB SHADOW E&M-EST. PATIENT-LVL V: ICD-10-PCS | Mod: PBBFAC,,, | Performed by: PHYSICIAN ASSISTANT

## 2019-03-08 PROCEDURE — 99215 OFFICE O/P EST HI 40 MIN: CPT | Mod: PBBFAC,PN | Performed by: PHYSICIAN ASSISTANT

## 2019-03-08 PROCEDURE — 99999 PR PBB SHADOW E&M-EST. PATIENT-LVL V: CPT | Mod: PBBFAC,,, | Performed by: PHYSICIAN ASSISTANT

## 2019-03-08 PROCEDURE — 99213 OFFICE O/P EST LOW 20 MIN: CPT | Mod: S$PBB,,, | Performed by: PHYSICIAN ASSISTANT

## 2019-03-08 PROCEDURE — 99213 PR OFFICE/OUTPT VISIT, EST, LEVL III, 20-29 MIN: ICD-10-PCS | Mod: S$PBB,,, | Performed by: PHYSICIAN ASSISTANT

## 2019-03-08 NOTE — PROGRESS NOTES
This note was completed with dictation software and grammatical errors may exist.    CC: Neck pain, back pain    HPI: The patient is a 63-year-old man with a history of hypertension, chronic neck and back pain who presents in referral from [facility] for continued neck and back pain. He is status post C7-T1 cervical interlaminar epidural steroid injection on 02/04/2019 with 40% relief.  He continues to have neck pain but feels that it is much more tolerable following the injection.  He states that he can function is long as he takes his medication and he is not having difficulty performing activities of daily living since the injection. The pain is still in the left side of his neck and radiates to the left trapezius muscle but does not have pain radiating to the left upper arm anymore.  His range of motion has improved a little bit as well. Currently his low back pain is tolerable.  He denies weakness or numbness, no bladder or bowel incontinence.    Pain intervention history: [facility] gabapentin, Robaxin, capsaicin cream for his neck.  He has had lumbar epidural steroid injections but nothing for his neck.  He is status post C7-T1 cervical interlaminar epidural steroid injection on 8/22/16 with what sounds like moderate relief.  He is status post C7-T1 cervical interlaminar epidural steroid on 7/24/17 with 25% relief.  He is not interested in doing any physical therapy.  He is status post C7-T1 cervical interlaminar epidural steroid injection on 9/12/17 with what sounds like excellent relief.  He is status post L5/S1 interlaminar epidural steroid injection on 10/31/17 with 30% relief.  He is status post L5/S1 interlaminar epidural steroid injection on 1/15/18 with 40% relief.  He is status post C7-T1 cervical interlaminar epidural steroid injection on 02/04/2019 with 40% relief.      From 1/24/2019 visit:  [facility] who  suggested against cervical or lumbar spine surgery.  The patient also asked him about an opioid pump and spinal cord stimulation. He states the surgeon also recommended against these due to the possibility of developing keloids.     ROS: He reports back pain, neck pain.  Balance of review of systems is negative.    Medical, surgical, family and social history reviewed elsewhere in record.    Medications/Allergies: See med card    Vitals:    03/08/19 1031   BP: (!) 140/77   Pulse: 64   Resp: 20   Temp: 97.5 °F (36.4 °C)   TempSrc: Oral   SpO2: 98%   Weight: 96 kg (211 lb 10.3 oz)   PainSc:   4   PainLoc: Neck         Physical exam:  Gen: A and O x3, pleasant, well-groomed  Skin: No rashes or obvious lesions  HEENT: PERRLA, no obvious deformities on ears or in canals.Trachea midline.  CVS: Regular rate and rhythm, normal palpable pulses.  Resp: Clear to auscultation bilaterally, no wheezes or rales.  Abdomen: Soft, NT/ND.  Musculoskeletal:  Antalgic gait due to left knee pain.  Wearing a knee brace.    Neuro:  Upper extremities: 5/5 strength bilaterally   Lower extremities: 5/5 strength bilaterally  Reflexes: Brachioradialis 2+, Bicep 2+, Tricep 2+.  Patellar 2+, Achilles 2+ bilaterally.  Sensory: Intact and symmetrical to light touch and pinprick in C2-T1 dermatomes bilaterally.  Intact and symmetrical to light touch and pinprick in L2-S1 dermatomes bilaterally.    Cervical Spine:  Cervical spine: ROM is moderately limited with left lateral rotation with increased left neck and left trapezius muscle pain. Range of motion has mildly improved.  Range of motion is mildly limited with flexion, extension right lateral rotation with mild increased left neck pain.  Spurling's maneuver causes no neck pain to either side.  Myofascial exam:  No tenderness to palpation to the cervical paraspinous muscles.    Lumbar spine:  Lumbar spine: ROM is mildly limited with flexion moderately limited with extension and oblique extension  with increased low back pain during each maneuver but especially with extension.  Juma's test causes no increased pain on either side.    Supine straight leg raise causes posterior thigh pain bilaterally.  Internal and external rotation of the hip causes no increased pain on either side.  Myofascial exam: No tenderness to palpation across lumbar paraspinous muscles.      Imaging:  MRI cervical spine 5/16/16: C1/2 normal.  C2/3 mild facet arthropathy.  At C3/4 mild facet arthropathy.  C4/5 mild small broad-based posterior disc bulge and facet arthrosis resulting in ventral CSF space effacement and mild ventral cord flattening.  At C5/6 there is broad-based disc bulge with focal left paracentral disc protrusion bilateral facet arthrosis and left uncovertebral spurring resulting in severe left neural foraminal narrowing, ventral CSF space effacement and mild ventral cord flattening.  At C6/7 there is broad-based posterior disc bulge and osteophytes bilateral uncovertebral spurring and facet arthropathy resulting in partial ventral CSF space effacement, mild ventral cord flattening and severe bilateral foraminal stenosis.  At C7/T1 there is broad-based posterior disc bulge with bilateral facet arthrosis and left uncovertebral spurring resulting in partial CSF space effacement and ventral cord flattening and severe left neural foraminal stenosis.    CT lumbar spine report 7/15/11  L2-3 mild disc bulge  L3-4 mild bulging of the disc with flattening of the ventral thecal sac resulting in mild narrowing of the foramina  L4-5 mild bulging of the disc is present along with ligamentum flavum hypertrophy combine to result in a moderate degree of canal stenosis with mild foraminal narrowing    MRI lumbar spine 8/25/17  At the T12-L1 normal, mild ligamentous hypertrophy is noted, no significant central canal stenosis or neural foraminal stenosis is noted.  At the L1-L2 level, mild ligamentous hypertrophy appears to be present,   no significant disk bulge, central canal stenosis, or neural foraminal stenosis is noted  At L2-L3 level, broad-based bulge or protrusion appears to be present of 4 mm with asymmetric bulging towards each neural foramen. Mild bilateral neural foraminal stenosis is noted. Mild central canal narrowing is noted to 9 mm. Mild bilateral lateral recess narrowing is noted.  At L3-L4 level, asymmetric bulging is noted towards the left neural foramen slightly greater than right of approximately 3 mm, mild left greater than right neural foraminal narrowing is noted without significant central canal stenosis. Mild facet arthropathy and ligamentous hypertrophy is noted.  At the L4-L5 level, broad-based protrusion appears to be present centrally of 6 mm with increased T2 signal suggestive of an annular tear. Ligamentous hypertrophy and facet arthropathy appears to be present. Moderate central canal stenosis appears to be present with bunching of the nerve roots and central canal narrowing to 7 mm. Bilateral lateral recess stenosis is noted. Mild/moderate bilateral neural foraminal narrowing is noted slightly greater to the right than left.  At the L5-S1 level, broad-based bulge is noted centrally of 3 mm with increased T2 signal this can be seen with an annular tear, mild left greater than right neural foraminal stenosis appears to be present. Mild central canal narrowing is noted.      Assessment:  The patient is a 63-year-old man with a history of hypertension, chronic neck and back pain who presents in referral from [facility] for continued neck and back pain.   1. Cervical radiculopathy     2. Cervical stenosis of spine     3. Spinal stenosis of lumbar region with neurogenic claudication     4. Lumbar radiculopathy     5. Chronic pain of left knee           Plan:  1.  The patient did well following the cervical SOLIS and he feels that his pain is now tolerable.  I will have him follow-up in 3 months or sooner  as needed and we can discuss repeating the injection if necessary or repeating a lumbar SOLIS if necessary at that time.    Greater than 50% of this 15 min visit was spent on counseling the patient.

## 2019-04-18 ENCOUNTER — OFFICE VISIT (OUTPATIENT)
Dept: FAMILY MEDICINE | Facility: CLINIC | Age: 64
End: 2019-04-18
Payer: MEDICARE

## 2019-04-18 VITALS
BODY MASS INDEX: 29.21 KG/M2 | DIASTOLIC BLOOD PRESSURE: 76 MMHG | HEART RATE: 66 BPM | SYSTOLIC BLOOD PRESSURE: 125 MMHG | WEIGHT: 215.63 LBS | TEMPERATURE: 98 F | HEIGHT: 72 IN

## 2019-04-18 DIAGNOSIS — I10 BENIGN ESSENTIAL HTN: ICD-10-CM

## 2019-04-18 DIAGNOSIS — M54.12 CERVICAL RADICULOPATHY: ICD-10-CM

## 2019-04-18 DIAGNOSIS — M54.16 LUMBAR RADICULOPATHY: ICD-10-CM

## 2019-04-18 DIAGNOSIS — M51.36 DDD (DEGENERATIVE DISC DISEASE), LUMBAR: ICD-10-CM

## 2019-04-18 DIAGNOSIS — M48.02 CERVICAL STENOSIS OF SPINE: Primary | ICD-10-CM

## 2019-04-18 PROCEDURE — 99999 PR PBB SHADOW E&M-EST. PATIENT-LVL IV: ICD-10-PCS | Mod: PBBFAC,,, | Performed by: FAMILY MEDICINE

## 2019-04-18 PROCEDURE — 99213 PR OFFICE/OUTPT VISIT, EST, LEVL III, 20-29 MIN: ICD-10-PCS | Mod: S$PBB,,, | Performed by: FAMILY MEDICINE

## 2019-04-18 PROCEDURE — 99999 PR PBB SHADOW E&M-EST. PATIENT-LVL IV: CPT | Mod: PBBFAC,,, | Performed by: FAMILY MEDICINE

## 2019-04-18 PROCEDURE — 99214 OFFICE O/P EST MOD 30 MIN: CPT | Mod: PBBFAC,PO | Performed by: FAMILY MEDICINE

## 2019-04-18 PROCEDURE — 99213 OFFICE O/P EST LOW 20 MIN: CPT | Mod: S$PBB,,, | Performed by: FAMILY MEDICINE

## 2019-04-18 RX ORDER — HYDROCODONE BITARTRATE AND ACETAMINOPHEN 10; 325 MG/1; MG/1
1 TABLET ORAL 4 TIMES DAILY
Qty: 360 TABLET | Refills: 0 | Status: SHIPPED | OUTPATIENT
Start: 2019-04-18 | End: 2019-07-17 | Stop reason: SDUPTHER

## 2019-04-18 NOTE — PROGRESS NOTES
The patient presents today fu chronic pain management generally tolerating w HC 10    Hx cervical stenosis and lumbar disc disease w chonic pain and radiculopathy. He has DJD todd bilateral knees. Lt knee is getting worse  HBP and HLP controlled     Past Medical History:  Past Medical History:   Diagnosis Date    Anticoagulant long-term use     aspirn    Asthma     Cervical stenosis of spine     DDD (degenerative disc disease), lumbar     Depression     DJD (degenerative joint disease)     Hyperlipidemia     Hypertension     Intervertebral disc protrusion     Seasonal allergies     Skin abnormalities      Past Surgical History:   Procedure Laterality Date    cervical injection      COLONOSCOPY      COLONOSCOPY N/A 6/8/2015    [facility]formed [facility] ENDO    Injection-steroid-epidural-cervical N/A 2/4/2019    [facility] Phil [facility] at [facility] OR    INJECTION-STEROID-EPIDURAL-CERVICAL N/A 9/12/2017    [facility]    INJECTION-STEROID-EPIDURAL-CERVICAL N/A 7/24/2017    [facility]    INJECTION-STEROID-EPIDURAL-CERVICAL N/A 8/22/2016    [facility]    INJECTION-STEROID-EPIDURAL-LUMBAR L5/S1 N/A 1/15/2018    [facility]    INJECTION-STEROID-EPIDURAL-LUMBAR L5/S1 N/A 10/31/2017    [facility] OR    MULTIPLE TOOTH EXTRACTIONS       Review of patient's allergies indicates:   Allergen Reactions    Ace inhibitors Swelling     Current Outpatient Medications on File Prior to Visit   Medication Sig Dispense Refill    albuterol 90 mcg/actuation inhaler Inhale 2 puffs into the lungs every 6 (six) hours as needed for Wheezing. 54 g 4    amlodipine (NORVASC) 10 MG tablet Take 1 tablet (10 mg total) by mouth once daily. 90 tablet 3    aspirin 81 MG Chew Take 1 tablet (81 mg total) by mouth once daily. 90 tablet  3    atenolol (TENORMIN) 25 MG tablet Take 0.5 tablets (12.5 mg total) by mouth once daily. 90 tablet 3    diltiaZEM (DILACOR XR) 180 MG CDCR Take 1 capsule (180 mg total) by mouth 2 (two) times daily. 180 capsule 3    FLUoxetine (PROZAC) 20 MG capsule       gabapentin (NEURONTIN) 300 MG capsule Take 2 capsules (600 mg total) by mouth 3 (three) times daily. 540 capsule 3    hydrochlorothiazide (HYDRODIURIL) 12.5 MG Tab Take 1 tablet (12.5 mg total) by mouth once daily. 90 tablet 3    ibuprofen (ADVIL,MOTRIN) 800 MG tablet Take 1 tablet (800 mg total) by mouth 4 (four) times daily. 360 tablet 3    ketoconazole (NIZORAL) 2 % shampoo Apply topically 3 (three) times a week. 120 mL 3    loratadine (CLARITIN) 10 mg tablet Take 1 tablet (10 mg total) by mouth once daily. 90 tablet 3    LUBRICANT EYE, POLYV ALCOHOL, 1.4 % ophthalmic solution       meloxicam (MOBIC) 15 MG tablet Take 0.5 tablets (7.5 mg total) by mouth once daily. 90 tablet 3    mupirocin (BACTROBAN) 2 % ointment Apply topically 3 (three) times daily. 1 Tube 0    potassium chloride (KLOR-CON) 10 MEQ TbSR Take 2 tablets (20 mEq total) by mouth 2 (two) times daily. 360 tablet 3    simvastatin (ZOCOR) 20 MG tablet Take 1 tablet (20 mg total) by mouth every evening. 90 tablet 3    SYMBICORT 80-4.5 mcg/actuation HFAA 2 puffs daily as needed.       terbinafine HCl (LAMISIL) 1 % cream Apply topically 2 (two) times daily. 42 g 3    travoprost, benzalkonium, (TRAVATAN) 0.004 % ophthalmic solution Place 1 drop into both eyes nightly. 5 mL 3    triamcinolone acetonide 0.1% (KENALOG) 0.1 % Lotn Apply topically 2 (two) times daily. 60 mL 3    white petrolatum-mineral oil (EUCERIN) Crea Apply topically as needed. 454 g 3    [DISCONTINUED] HYDROcodone-acetaminophen (NORCO)  mg per tablet Take 1 tablet by mouth 4 (four) times daily. 360 tablet 0     No current facility-administered medications on file prior to visit.      Social History      Socioeconomic History    Marital status:      Spouse name: Not on file    Number of children: 3    Years of education: Not on file    Highest education level: Not on file   Occupational History    Not on file   Social Needs    Financial resource strain: Not on file    Food insecurity:     Worry: Not on file     Inability: Not on file    Transportation needs:     Medical: Not on file     Non-medical: Not on file   Tobacco Use    Smoking status: Current Some Day Smoker     Packs/day: 0.50     Last attempt to quit: 5/12/2015     Years since quitting: 3.9    Smokeless tobacco: Never Used    Tobacco comment: smokes once per month   Substance and Sexual Activity    Alcohol use: No    Drug use: No    Sexual activity: Not on file   Lifestyle    Physical activity:     Days per week: Not on file     Minutes per session: Not on file    Stress: Not on file   Relationships    Social connections:     Talks on phone: Not on file     Gets together: Not on file     Attends [employer] service: Not on file     Active member of club or organization: Not on file     Attends meetings of clubs or organizations: Not on file     Relationship status: Not on file   Other Topics Concern    Not on file   Social History Narrative    Not on file     History reviewed. No pertinent family history.      ROS:GENERAL: No fever, chills, fatigability or weight loss.  SKIN: No rashes, itching or changes in color or texture of skin.  HEAD: No headaches or recent head trauma.EYES: Visual acuity fine. No photophobia, ocular pain or diplopia.EARS: Denies ear pain, discharge or vertigo.NOSE: No loss of smell, no epistaxis or postnasal drip.MOUTH & THROAT: No hoarseness or change in voice. No excessive gum bleeding.NODES: Denies swollen glands.  CHEST: Denies CHESTER, cyanosis, wheezing, cough and sputum production.  CARDIOVASCULAR: Denies chest pain, PND, orthopnea or reduced exercise tolerance.  ABDOMEN: Appetite fine. No weight  loss. Denies diarrhea, abdominal pain, hematemesis or blood in stool.  URINARY: No flank pain, dysuria or hematuria.  PERIPHERAL VASCULAR: No claudication or cyanosis.  MUSCULOSKELETAL: See above.  NEUROLOGIC: No history of seizures, paralysis, alteration of gait or coordination.  PE:   HEAD: Normocephalic, atraumatic.EYES: PERRL. EOMI.   EARS: TM's intact. Light reflex normal. No retraction or perforation.   NOSE: Mucosa pink. Airway clear.MOUTH & THROAT: No tonsillar enlargement. No pharyngeal erythema or exudate. No stridor.  NODES: No cervical, axillary or inguinal lymph node enlargement.  CHEST: Lungs clear to auscultation except scattered ronchi   CARDIOVASCULAR: Normal S1, S2. No rubs, murmurs or gallops.  ABDOMEN: Bowel sounds normal. Not distended. Soft. No tenderness or masses.  MUSCULOSKELETAL: Tender bilateral paracervical paralumbar neg SLR, tender knees and shoulders lt injected     Lac healing no redness or dc    NEUROLOGIC: Cranial Nerves: II-XII grossly intact.  Motor: 5/5 strength major flexors/extensors.  DTR's: Knees, Ankles 2+ and equal bilaterally; downgoing toes.  Sensory: Decr sensations hands  Gait & Posture: Antalgic gait     Impression: Cerv stenosis  Shoulder arthropathy  Radiculopathy  Lt meniscal tear   HBP  HLP     Plan:   Rev med recs   Cont current BP tx   Rev pain management    Reviewed meds ,pain management ,risks benefits meds

## 2019-04-18 NOTE — PROGRESS NOTES
The patient presents today fu chronic pain management generally tolerating w HC 10    Hx cervical stenosis and lumbar disc disease w chonic pain and radiculopathy. He has DJD todd bilateral knees. Lt knee is getting worse  HBP and HLP controlled     Past Medical History:  Past Medical History:   Diagnosis Date    Anticoagulant long-term use     aspirn    Asthma     Cervical stenosis of spine     DDD (degenerative disc disease), lumbar     Depression     DJD (degenerative joint disease)     Hyperlipidemia     Hypertension     Intervertebral disc protrusion     Seasonal allergies     Skin abnormalities      Past Surgical History:   Procedure Laterality Date    cervical injection      COLONOSCOPY      COLONOSCOPY N/A 6/8/2015    Performed by Isael Decker MD at [facility] ENDO    Injection-steroid-epidural-cervical N/A 2/4/2019    Performed by Phil Ann [facility] at [facility] OR    INJECTION-STEROID-EPIDURAL-CERVICAL N/A 9/12/2017    [facility] OR    INJECTION-STEROID-EPIDURAL-CERVICAL N/A 7/24/2017    Per[facility] OR    INJECTION-STEROID-EPIDURAL-CERVICAL N/A 8/22/2016    [facility] OR    INJECTION-STEROID-EPIDURAL-LUMBAR L5/S1 N/A 1/15/2018    Per[facility] OR    INJECTION-STEROID-EPIDURAL-LUMBAR L5/S1 N/A 10/31/2017    Per[facility] OR    MULTIPLE TOOTH EXTRACTIONS       Review of patient's allergies indicates:   Allergen Reactions    Ace inhibitors Swelling     Current Outpatient Medications on File Prior to Visit   Medication Sig Dispense Refill    albuterol 90 mcg/actuation inhaler Inhale 2 puffs into the lungs every 6 (six) hours as needed for Wheezing. 54 g 4    amlodipine (NORVASC) 10 MG tablet Take 1 tablet (10 mg total) by mouth once daily. 90 tablet 3    aspirin 81 MG Chew Take 1 tablet (81 mg total) by mouth once daily. 90 tablet  3    atenolol (TENORMIN) 25 MG tablet Take 0.5 tablets (12.5 mg total) by mouth once daily. 90 tablet 3    diltiaZEM (DILACOR XR) 180 MG CDCR Take 1 capsule (180 mg total) by mouth 2 (two) times daily. 180 capsule 3    FLUoxetine (PROZAC) 20 MG capsule       gabapentin (NEURONTIN) 300 MG capsule Take 2 capsules (600 mg total) by mouth 3 (three) times daily. 540 capsule 3    hydrochlorothiazide (HYDRODIURIL) 12.5 MG Tab Take 1 tablet (12.5 mg total) by mouth once daily. 90 tablet 3    HYDROcodone-acetaminophen (NORCO)  mg per tablet Take 1 tablet by mouth 4 (four) times daily. 360 tablet 0    ibuprofen (ADVIL,MOTRIN) 800 MG tablet Take 1 tablet (800 mg total) by mouth 4 (four) times daily. 360 tablet 3    ketoconazole (NIZORAL) 2 % shampoo Apply topically 3 (three) times a week. 120 mL 3    loratadine (CLARITIN) 10 mg tablet Take 1 tablet (10 mg total) by mouth once daily. 90 tablet 3    LUBRICANT EYE, POLYV ALCOHOL, 1.4 % ophthalmic solution       meloxicam (MOBIC) 15 MG tablet Take 0.5 tablets (7.5 mg total) by mouth once daily. 90 tablet 3    mupirocin (BACTROBAN) 2 % ointment Apply topically 3 (three) times daily. 1 Tube 0    potassium chloride (KLOR-CON) 10 MEQ TbSR Take 2 tablets (20 mEq total) by mouth 2 (two) times daily. 360 tablet 3    simvastatin (ZOCOR) 20 MG tablet Take 1 tablet (20 mg total) by mouth every evening. 90 tablet 3    SYMBICORT 80-4.5 mcg/actuation HFAA 2 puffs daily as needed.       terbinafine HCl (LAMISIL) 1 % cream Apply topically 2 (two) times daily. 42 g 3    travoprost, benzalkonium, (TRAVATAN) 0.004 % ophthalmic solution Place 1 drop into both eyes nightly. 5 mL 3    triamcinolone acetonide 0.1% (KENALOG) 0.1 % Lotn Apply topically 2 (two) times daily. 60 mL 3    white petrolatum-mineral oil (EUCERIN) Crea Apply topically as needed. 454 g 3     No current facility-administered medications on file prior to visit.      Social History     Socioeconomic History     Marital status:      Spouse name: Not on file    Number of children: 3    Years of education: Not on file    Highest education level: Not on file   Occupational History    Not on file   Social Needs    Financial resource strain: Not on file    Food insecurity:     Worry: Not on file     Inability: Not on file    Transportation needs:     Medical: Not on file     Non-medical: Not on file   Tobacco Use    Smoking status: Current Some Day Smoker     Packs/day: 0.50     Last attempt to quit: 5/12/2015     Years since quitting: 3.9    Smokeless tobacco: Never Used    Tobacco comment: smokes once per month   Substance and Sexual Activity    Alcohol use: No    Drug use: No    Sexual activity: Not on file   Lifestyle    Physical activity:     Days per week: Not on file     Minutes per session: Not on file    Stress: Not on file   Relationships    Social connections:     Talks on phone: Not on file     Gets together: Not on file     Attends [employer] service: Not on file     Active member of club or organization: Not on file     Attends meetings of clubs or organizations: Not on file     Relationship status: Not on file   Other Topics Concern    Not on file   Social History Narrative    Not on file     History reviewed. No pertinent family history.      ROS:GENERAL: No fever, chills, fatigability or weight loss.  SKIN: No rashes, itching or changes in color or texture of skin.  HEAD: No headaches or recent head trauma.EYES: Visual acuity fine. No photophobia, ocular pain or diplopia.EARS: Denies ear pain, discharge or vertigo.NOSE: No loss of smell, no epistaxis or postnasal drip.MOUTH & THROAT: No hoarseness or change in voice. No excessive gum bleeding.NODES: Denies swollen glands.  CHEST: Denies CHESTER, cyanosis, wheezing, cough and sputum production.  CARDIOVASCULAR: Denies chest pain, PND, orthopnea or reduced exercise tolerance.  ABDOMEN: Appetite fine. No weight loss. Denies diarrhea,  abdominal pain, hematemesis or blood in stool.  URINARY: No flank pain, dysuria or hematuria.  PERIPHERAL VASCULAR: No claudication or cyanosis.  MUSCULOSKELETAL: See above.  NEUROLOGIC: No history of seizures, paralysis, alteration of gait or coordination.  PE:   HEAD: Normocephalic, atraumatic.EYES: PERRL. EOMI.   EARS: TM's intact. Light reflex normal. No retraction or perforation.   NOSE: Mucosa pink. Airway clear.MOUTH & THROAT: No tonsillar enlargement. No pharyngeal erythema or exudate. No stridor.  NODES: No cervical, axillary or inguinal lymph node enlargement.  CHEST: Lungs clear to auscultation except scattered ronchi   CARDIOVASCULAR: Normal S1, S2. No rubs, murmurs or gallops.  ABDOMEN: Bowel sounds normal. Not distended. Soft. No tenderness or masses.  MUSCULOSKELETAL: Tender bilateral paracervical paralumbar neg SLR, tender knees and shoulders lt injected     Lac healing no redness or dc    NEUROLOGIC: Cranial Nerves: II-XII grossly intact.  Motor: 5/5 strength major flexors/extensors.  DTR's: Knees, Ankles 2+ and equal bilaterally; downgoing toes.  Sensory: Decr sensations hands  Gait & Posture: Antalgic gait     Impression: Cerv stenosis  Shoulder arthropathy  Radiculopathy  Lt meniscal tear   HBP  HLP     Plan:   Rev med recs   Cont current BP tx   Rev pain management    Reviewed meds ,pain management ,risks benefits meds

## 2019-06-24 ENCOUNTER — OFFICE VISIT (OUTPATIENT)
Dept: PAIN MEDICINE | Facility: CLINIC | Age: 64
End: 2019-06-24
Payer: MEDICARE

## 2019-06-24 VITALS
OXYGEN SATURATION: 98 % | RESPIRATION RATE: 20 BRPM | SYSTOLIC BLOOD PRESSURE: 134 MMHG | WEIGHT: 207.88 LBS | BODY MASS INDEX: 28.2 KG/M2 | HEART RATE: 60 BPM | DIASTOLIC BLOOD PRESSURE: 69 MMHG | TEMPERATURE: 96 F

## 2019-06-24 DIAGNOSIS — M54.12 CERVICAL RADICULOPATHY: Primary | ICD-10-CM

## 2019-06-24 DIAGNOSIS — M48.02 CERVICAL STENOSIS OF SPINE: ICD-10-CM

## 2019-06-24 DIAGNOSIS — M54.16 LUMBAR RADICULOPATHY: ICD-10-CM

## 2019-06-24 DIAGNOSIS — M48.062 SPINAL STENOSIS OF LUMBAR REGION WITH NEUROGENIC CLAUDICATION: ICD-10-CM

## 2019-06-24 DIAGNOSIS — M51.36 DDD (DEGENERATIVE DISC DISEASE), LUMBAR: ICD-10-CM

## 2019-06-24 DIAGNOSIS — G89.29 CHRONIC PAIN OF LEFT KNEE: ICD-10-CM

## 2019-06-24 DIAGNOSIS — M25.562 CHRONIC PAIN OF LEFT KNEE: ICD-10-CM

## 2019-06-24 PROCEDURE — 99214 PR OFFICE/OUTPT VISIT, EST, LEVL IV, 30-39 MIN: ICD-10-PCS | Mod: S$PBB,,, | Performed by: PHYSICIAN ASSISTANT

## 2019-06-24 PROCEDURE — 99999 PR PBB SHADOW E&M-EST. PATIENT-LVL V: CPT | Mod: PBBFAC,,, | Performed by: PHYSICIAN ASSISTANT

## 2019-06-24 PROCEDURE — 99214 OFFICE O/P EST MOD 30 MIN: CPT | Mod: S$PBB,,, | Performed by: PHYSICIAN ASSISTANT

## 2019-06-24 PROCEDURE — 99999 PR PBB SHADOW E&M-EST. PATIENT-LVL V: ICD-10-PCS | Mod: PBBFAC,,, | Performed by: PHYSICIAN ASSISTANT

## 2019-06-24 PROCEDURE — 99215 OFFICE O/P EST HI 40 MIN: CPT | Mod: PBBFAC,PN | Performed by: PHYSICIAN ASSISTANT

## 2019-06-24 RX ORDER — SODIUM CHLORIDE, SODIUM LACTATE, POTASSIUM CHLORIDE, CALCIUM CHLORIDE 600; 310; 30; 20 MG/100ML; MG/100ML; MG/100ML; MG/100ML
INJECTION, SOLUTION INTRAVENOUS CONTINUOUS
Status: CANCELLED | OUTPATIENT
Start: 2019-07-18

## 2019-06-27 NOTE — PROGRESS NOTES
This note was completed with dictation software and grammatical errors may exist.    CC: Neck pain, back pain    HPI: The patient is a 63-year-old man with a history of hypertension, chronic neck and back pain who presents in referral from [facility] for continued neck and back pain. He returns in follow-up today with neck pain greater than back pain. He describes pain in the left neck radiating to the left trapezius muscle, left shoulder and left upper arm down to the elbow.  This is constant, worse with turning his head in using his arms.  He also reports pain across the low back without current radiation into his legs.  He continues to have left knee pain as well and wears a brace for this.  He reports generalized weakness in his arms and legs.  He denies numbness or incontinence.    [facility] intervention history: [facility] gabapentin, Robaxin, capsaicin cream for his neck.  He has had lumbar epidural steroid injections but nothing for his neck.  He is status post C7-T1 cervical interlaminar epidural steroid injection on 8/22/16 with what sounds like moderate relief.  He is status post C7-T1 cervical interlaminar epidural steroid on 7/24/17 with 25% relief.  He is not interested in doing any physical therapy.  He is status post C7-T1 cervical interlaminar epidural steroid injection on 9/12/17 with what sounds like excellent relief.  He is status post L5/S1 interlaminar epidural steroid injection on 10/31/17 with 30% relief.  He is status post L5/S1 interlaminar epidural steroid injection on 1/15/18 with 40% relief.  He is status post C7-T1 cervical interlaminar epidural steroid injection on 02/04/2019 with 40% relief.      From 1/24/2019 visit:  [facility] who [facility] against cervical or lumbar spine surgery.  The patient also asked him about an opioid pump and spinal cord stimulation. He states the surgeon also recommended against  these due to the possibility of developing keloids.     ROS: He reports back pain, neck pain.  Balance of review of systems is negative.    Medical, surgical, family and social history reviewed elsewhere in record.    Medications/Allergies: See med card    Vitals:    06/24/19 1122   BP: 134/69   Pulse: 60   Resp: 20   Temp: 96.4 °F (35.8 °C)   TempSrc: Oral   SpO2: 98%   Weight: 94.3 kg (207 lb 14.3 oz)   PainSc:   4   PainLoc: Neck         Physical exam:  Gen: A and O x3, pleasant, well-groomed  Skin: No rashes or obvious lesions  HEENT: PERRLA, no obvious deformities on ears or in canals.Trachea midline.  CVS: Regular rate and rhythm, normal palpable pulses.  Resp: Clear to auscultation bilaterally, no wheezes or rales.  Abdomen: Soft, NT/ND.  Musculoskeletal:  Antalgic gait due to left knee pain.  Wearing a knee brace.    Neuro:  Upper extremities: 5/5 strength bilaterally   Lower extremities: 5/5 strength bilaterally  Reflexes: Brachioradialis 2+, Bicep 2+, Tricep 2+.  Patellar 2+, Achilles 2+ bilaterally.  Sensory: Intact and symmetrical to light touch and pinprick in C2-T1 dermatomes bilaterally.  Intact and symmetrical to light touch and pinprick in L2-S1 dermatomes bilaterally.    Cervical Spine:  Cervical spine: ROM is moderately limited with left lateral rotation with increased left neck and left trapezius muscle pain. Range of motion has mildly improved.  Range of motion is mildly limited with flexion, extension right lateral rotation with mild increased left neck pain.  Spurling's maneuver causes no neck pain to either side.  Myofascial exam:  No tenderness to palpation to the cervical paraspinous muscles.    Lumbar spine:  Lumbar spine: ROM is mildly limited with flexion moderately limited with extension and oblique extension with increased low back pain during each maneuver but especially with extension.  Juma's test causes no increased pain on either side.    Supine straight leg raise causes  posterior thigh pain bilaterally.  Internal and external rotation of the hip causes no increased pain on either side.  Myofascial exam: No tenderness to palpation across lumbar paraspinous muscles.      Imaging:  MRI cervical spine 5/16/16: C1/2 normal.  C2/3 mild facet arthropathy.  At C3/4 mild facet arthropathy.  C4/5 mild small broad-based posterior disc bulge and facet arthrosis resulting in ventral CSF space effacement and mild ventral cord flattening.  At C5/6 there is broad-based disc bulge with focal left paracentral disc protrusion bilateral facet arthrosis and left uncovertebral spurring resulting in severe left neural foraminal narrowing, ventral CSF space effacement and mild ventral cord flattening.  At C6/7 there is broad-based posterior disc bulge and osteophytes bilateral uncovertebral spurring and facet arthropathy resulting in partial ventral CSF space effacement, mild ventral cord flattening and severe bilateral foraminal stenosis.  At C7/T1 there is broad-based posterior disc bulge with bilateral facet arthrosis and left uncovertebral spurring resulting in partial CSF space effacement and ventral cord flattening and severe left neural foraminal stenosis.    CT lumbar spine report 7/15/11  L2-3 mild disc bulge  L3-4 mild bulging of the disc with flattening of the ventral thecal sac resulting in mild narrowing of the foramina  L4-5 mild bulging of the disc is present along with ligamentum flavum hypertrophy combine to result in a moderate degree of canal stenosis with mild foraminal narrowing    MRI lumbar spine 8/25/17  At the T12-L1 normal, mild ligamentous hypertrophy is noted, no significant central canal stenosis or neural foraminal stenosis is noted.  At the L1-L2 level, mild ligamentous hypertrophy appears to be present,  no significant disk bulge, central canal stenosis, or neural foraminal stenosis is noted  At L2-L3 level, broad-based bulge or protrusion appears to be present of 4 mm with  asymmetric bulging towards each neural foramen. Mild bilateral neural foraminal stenosis is noted. Mild central canal narrowing is noted to 9 mm. Mild bilateral lateral recess narrowing is noted.  At L3-L4 level, asymmetric bulging is noted towards the left neural foramen slightly greater than right of approximately 3 mm, mild left greater than right neural foraminal narrowing is noted without significant central canal stenosis. Mild facet arthropathy and ligamentous hypertrophy is noted.  At the L4-L5 level, broad-based protrusion appears to be present centrally of 6 mm with increased T2 signal suggestive of an annular tear. Ligamentous hypertrophy and facet arthropathy appears to be present. Moderate central canal stenosis appears to be present with bunching of the nerve roots and central canal narrowing to 7 mm. Bilateral lateral recess stenosis is noted. Mild/moderate bilateral neural foraminal narrowing is noted slightly greater to the right than left.  At the L5-S1 level, broad-based bulge is noted centrally of 3 mm with increased T2 signal this can be seen with an annular tear, mild left greater than right neural foraminal stenosis appears to be present. Mild central canal narrowing is noted.      Assessment:  The patient is a 63-year-old man with a history of hypertension, chronic neck and back pain who presents in referral from [facility] for continued neck and back pain.   1. Cervical radiculopathy  Vital signs    Place 18-22 gaua peripheral IV     Verify informed consent    Notify physician     Notify physician     Notify physician (specify)    Diet NPO    Case Request Operating Room: Injection-steroid-epidural-cervical    Place in Outpatient    lactated ringers infusion   2. Cervical stenosis of spine     3. Spinal stenosis of lumbar region with neurogenic claudication     4. Lumbar radiculopathy     5. DDD (degenerative disc disease), lumbar     6. Chronic pain of left knee            Plan:  1.  I will set the patient up to repeat a cervical SOLIS to the left.  He has done well with this in the past.  2.  We can repeat a lumbar SOLIS in the future if necessary.  3.  Follow-up in 4 weeks postprocedure sooner as needed.    Greater than 50% of this 25 min visit was spent counseling patient.

## 2019-06-27 NOTE — H&P (VIEW-ONLY)
This note was completed with dictation software and grammatical errors may exist.    CC: Neck pain, back pain    HPI: The patient is a 63-year-old man with a history of hypertension, chronic neck and back pain who presents in referral from [facility] for continued neck and back pain. He returns in follow-up today with neck pain greater than back pain. He describes pain in the left neck radiating to the left trapezius muscle, left shoulder and left upper arm down to the elbow.  This is constant, worse with turning his head in using his arms.  He also reports pain across the low back without current radiation into his legs.  He continues to have left knee pain as well and wears a brace for this.  He reports generalized weakness in his arms and legs.  He denies numbness or incontinence.    Pain intervention history: [facility], he has tried gabapentin, Robaxin, capsaicin cream for his neck.  He has had lumbar epidural steroid injections but nothing for his neck.  He is status post C7-T1 cervical interlaminar epidural steroid injection on 8/22/16 with what sounds like moderate relief.  He is status post C7-T1 cervical interlaminar epidural steroid on 7/24/17 with 25% relief.  He is not interested in doing any physical therapy.  He is status post C7-T1 cervical interlaminar epidural steroid injection on 9/12/17 with what sounds like excellent relief.  He is status post L5/S1 interlaminar epidural steroid injection on 10/31/17 with 30% relief.  He is status post L5/S1 interlaminar epidural steroid injection on 1/15/18 with 40% relief.  He is status post C7-T1 cervical interlaminar epidural steroid injection on 02/04/2019 with 40% relief.      From 1/24/2019 visit:  [facility] who [facility] against cervical or lumbar spine surgery.  The patient also asked him about an opioid pump and spinal cord stimulation. He states the surgeon also recommended against  these due to the possibility of developing keloids.     ROS: He reports back pain, neck pain.  Balance of review of systems is negative.    Medical, surgical, family and social history reviewed elsewhere in record.    Medications/Allergies: See med card    Vitals:    06/24/19 1122   BP: 134/69   Pulse: 60   Resp: 20   Temp: 96.4 °F (35.8 °C)   TempSrc: Oral   SpO2: 98%   Weight: 94.3 kg (207 lb 14.3 oz)   PainSc:   4   PainLoc: Neck         Physical exam:  Gen: A and O x3, pleasant, well-groomed  Skin: No rashes or obvious lesions  HEENT: PERRLA, no obvious deformities on ears or in canals.Trachea midline.  CVS: Regular rate and rhythm, normal palpable pulses.  Resp: Clear to auscultation bilaterally, no wheezes or rales.  Abdomen: Soft, NT/ND.  Musculoskeletal:  Antalgic gait due to left knee pain.  Wearing a knee brace.    Neuro:  Upper extremities: 5/5 strength bilaterally   Lower extremities: 5/5 strength bilaterally  Reflexes: Brachioradialis 2+, Bicep 2+, Tricep 2+.  Patellar 2+, Achilles 2+ bilaterally.  Sensory: Intact and symmetrical to light touch and pinprick in C2-T1 dermatomes bilaterally.  Intact and symmetrical to light touch and pinprick in L2-S1 dermatomes bilaterally.    Cervical Spine:  Cervical spine: ROM is moderately limited with left lateral rotation with increased left neck and left trapezius muscle pain. Range of motion has mildly improved.  Range of motion is mildly limited with flexion, extension right lateral rotation with mild increased left neck pain.  Spurling's maneuver causes no neck pain to either side.  Myofascial exam:  No tenderness to palpation to the cervical paraspinous muscles.    Lumbar spine:  Lumbar spine: ROM is mildly limited with flexion moderately limited with extension and oblique extension with increased low back pain during each maneuver but especially with extension.  Juma's test causes no increased pain on either side.    Supine straight leg raise causes  posterior thigh pain bilaterally.  Internal and external rotation of the hip causes no increased pain on either side.  Myofascial exam: No tenderness to palpation across lumbar paraspinous muscles.      Imaging:  MRI cervical spine 5/16/16: C1/2 normal.  C2/3 mild facet arthropathy.  At C3/4 mild facet arthropathy.  C4/5 mild small broad-based posterior disc bulge and facet arthrosis resulting in ventral CSF space effacement and mild ventral cord flattening.  At C5/6 there is broad-based disc bulge with focal left paracentral disc protrusion bilateral facet arthrosis and left uncovertebral spurring resulting in severe left neural foraminal narrowing, ventral CSF space effacement and mild ventral cord flattening.  At C6/7 there is broad-based posterior disc bulge and osteophytes bilateral uncovertebral spurring and facet arthropathy resulting in partial ventral CSF space effacement, mild ventral cord flattening and severe bilateral foraminal stenosis.  At C7/T1 there is broad-based posterior disc bulge with bilateral facet arthrosis and left uncovertebral spurring resulting in partial CSF space effacement and ventral cord flattening and severe left neural foraminal stenosis.    CT lumbar spine report 7/15/11  L2-3 mild disc bulge  L3-4 mild bulging of the disc with flattening of the ventral thecal sac resulting in mild narrowing of the foramina  L4-5 mild bulging of the disc is present along with ligamentum flavum hypertrophy combine to result in a moderate degree of canal stenosis with mild foraminal narrowing    MRI lumbar spine 8/25/17  At the T12-L1 normal, mild ligamentous hypertrophy is noted, no significant central canal stenosis or neural foraminal stenosis is noted.  At the L1-L2 level, mild ligamentous hypertrophy appears to be present,  no significant disk bulge, central canal stenosis, or neural foraminal stenosis is noted  At L2-L3 level, broad-based bulge or protrusion appears to be present of 4 mm with  asymmetric bulging towards each neural foramen. Mild bilateral neural foraminal stenosis is noted. Mild central canal narrowing is noted to 9 mm. Mild bilateral lateral recess narrowing is noted.  At L3-L4 level, asymmetric bulging is noted towards the left neural foramen slightly greater than right of approximately 3 mm, mild left greater than right neural foraminal narrowing is noted without significant central canal stenosis. Mild facet arthropathy and ligamentous hypertrophy is noted.  At the L4-L5 level, broad-based protrusion appears to be present centrally of 6 mm with increased T2 signal suggestive of an annular tear. Ligamentous hypertrophy and facet arthropathy appears to be present. Moderate central canal stenosis appears to be present with bunching of the nerve roots and central canal narrowing to 7 mm. Bilateral lateral recess stenosis is noted. Mild/moderate bilateral neural foraminal narrowing is noted slightly greater to the right than left.  At the L5-S1 level, broad-based bulge is noted centrally of 3 mm with increased T2 signal this can be seen with an annular tear, mild left greater than right neural foraminal stenosis appears to be present. Mild central canal narrowing is noted.      Assessment:  The patient is a 63-year-old man with a history of hypertension, chronic neck and back pain who presents in referral from [facility] for continued neck and back pain.   1. Cervical radiculopathy  Vital signs    Place 18-22 gaua peripheral IV     Verify informed consent    Notify physician     Notify physician     Notify physician (specify)    Diet NPO    Case Request Operating Room: Injection-steroid-epidural-cervical    Place in Outpatient    lactated ringers infusion   2. Cervical stenosis of spine     3. Spinal stenosis of lumbar region with neurogenic claudication     4. Lumbar radiculopathy     5. DDD (degenerative disc disease), lumbar     6. Chronic pain of left knee            Plan:  1.  I will set the patient up to repeat a cervical SOLIS to the left.  He has done well with this in the past.  2.  We can repeat a lumbar SOLIS in the future if necessary.  3.  Follow-up in 4 weeks postprocedure sooner as needed.    Greater than 50% of this 25 min visit was spent counseling patient.

## 2019-07-17 ENCOUNTER — OFFICE VISIT (OUTPATIENT)
Dept: FAMILY MEDICINE | Facility: CLINIC | Age: 64
End: 2019-07-17
Payer: MEDICARE

## 2019-07-17 VITALS
HEIGHT: 72 IN | DIASTOLIC BLOOD PRESSURE: 77 MMHG | HEART RATE: 58 BPM | BODY MASS INDEX: 27.87 KG/M2 | SYSTOLIC BLOOD PRESSURE: 132 MMHG | TEMPERATURE: 99 F | WEIGHT: 205.81 LBS

## 2019-07-17 DIAGNOSIS — M51.36 DDD (DEGENERATIVE DISC DISEASE), LUMBAR: ICD-10-CM

## 2019-07-17 DIAGNOSIS — M48.02 CERVICAL STENOSIS OF SPINE: ICD-10-CM

## 2019-07-17 DIAGNOSIS — M50.30 DDD (DEGENERATIVE DISC DISEASE), CERVICAL: Primary | ICD-10-CM

## 2019-07-17 DIAGNOSIS — M54.12 CERVICAL RADICULOPATHY: Primary | ICD-10-CM

## 2019-07-17 DIAGNOSIS — I10 BENIGN ESSENTIAL HTN: ICD-10-CM

## 2019-07-17 PROCEDURE — 99213 PR OFFICE/OUTPT VISIT, EST, LEVL III, 20-29 MIN: ICD-10-PCS | Mod: S$PBB,,, | Performed by: FAMILY MEDICINE

## 2019-07-17 PROCEDURE — 99999 PR PBB SHADOW E&M-EST. PATIENT-LVL IV: CPT | Mod: PBBFAC,,, | Performed by: FAMILY MEDICINE

## 2019-07-17 PROCEDURE — 99213 OFFICE O/P EST LOW 20 MIN: CPT | Mod: S$PBB,,, | Performed by: FAMILY MEDICINE

## 2019-07-17 PROCEDURE — 99999 PR PBB SHADOW E&M-EST. PATIENT-LVL IV: ICD-10-PCS | Mod: PBBFAC,,, | Performed by: FAMILY MEDICINE

## 2019-07-17 PROCEDURE — 99214 OFFICE O/P EST MOD 30 MIN: CPT | Mod: PBBFAC,PO | Performed by: FAMILY MEDICINE

## 2019-07-17 RX ORDER — HYDROCODONE BITARTRATE AND ACETAMINOPHEN 10; 325 MG/1; MG/1
1 TABLET ORAL 4 TIMES DAILY
Qty: 360 TABLET | Refills: 0 | Status: SHIPPED | OUTPATIENT
Start: 2019-07-17 | End: 2019-10-14 | Stop reason: SDUPTHER

## 2019-07-17 NOTE — PROGRESS NOTES
The patient presents today fu chronic pain management generally tolerating w HC 10    Hx cervical stenosis and lumbar disc disease w chonic pain and radiculopathy. He has DJD todd bilateral knees. Lt knee is getting worse  HBP and HLP controlled     Past Medical History:  Past Medical History:   Diagnosis Date    Anticoagulant long-term use     aspirn    Asthma     Cervical stenosis of spine     DDD (degenerative disc disease), lumbar     Depression     DJD (degenerative joint disease)     Hyperlipidemia     Hypertension     Intervertebral disc protrusion     Seasonal allergies     Skin abnormalities      Past Surgical History:   Procedure Laterality Date    cervical injection      COLONOSCOPY      COLONOSCOPY N/A 6/8/2015    Performed by Isael Decker MD at [facility] ENDO    Injection-steroid-epidural-cervical N/A 2/4/2019    Performed by Phil Ann MD at [facility] OR    INJECTION-STEROID-EPIDURAL-CERVICAL N/A 9/12/2017    Performed by Phil [facility] at [facility] OR    INJECTION-STEROID-EPIDURAL-CERVICAL N/A 7/24/2017    Performed by [facility] at [facility] OR    INJECTION-STEROID-EPIDURAL-CERVICAL N/A 8/22/2016    Performed by [facility] at [facility] OR    INJECTION-STEROID-EPIDURAL-LUMBAR L5/S1 N/A 1/15/2018    Performed by [facility] at [facility] OR    INJECTION-STEROID-EPIDURAL-LUMBAR L5/S1 N/A 10/31/2017    Performed by Phil [facility] at [facility] OR    MULTIPLE TOOTH EXTRACTIONS       Review of patient's allergies indicates:   Allergen Reactions    Ace inhibitors Swelling     Current Outpatient Medications on File Prior to Visit   Medication Sig Dispense Refill    albuterol 90 mcg/actuation inhaler Inhale 2 puffs into the lungs every 6 (six) hours as needed for Wheezing. 54 g 4    amlodipine (NORVASC) 10 MG tablet Take 1 tablet (10 mg total) by mouth once daily. 90 tablet 3    aspirin 81 MG Chew Take 1 tablet (81 mg total) by mouth once daily. 90 tablet  3    atenolol (TENORMIN) 25 MG tablet Take 0.5 tablets (12.5 mg total) by mouth once daily. 90 tablet 3    diltiaZEM (DILACOR XR) 180 MG CDCR Take 1 capsule (180 mg total) by mouth 2 (two) times daily. 180 capsule 3    FLUoxetine (PROZAC) 20 MG capsule       gabapentin (NEURONTIN) 300 MG capsule Take 2 capsules (600 mg total) by mouth 3 (three) times daily. 540 capsule 3    hydrochlorothiazide (HYDRODIURIL) 12.5 MG Tab Take 1 tablet (12.5 mg total) by mouth once daily. 90 tablet 3    HYDROcodone-acetaminophen (NORCO)  mg per tablet Take 1 tablet by mouth 4 (four) times daily. 360 tablet 0    ibuprofen (ADVIL,MOTRIN) 800 MG tablet Take 1 tablet (800 mg total) by mouth 4 (four) times daily. 360 tablet 3    ketoconazole (NIZORAL) 2 % shampoo Apply topically 3 (three) times a week. 120 mL 3    loratadine (CLARITIN) 10 mg tablet Take 1 tablet (10 mg total) by mouth once daily. 90 tablet 3    LUBRICANT EYE, POLYV ALCOHOL, 1.4 % ophthalmic solution       meloxicam (MOBIC) 15 MG tablet Take 0.5 tablets (7.5 mg total) by mouth once daily. 90 tablet 3    mupirocin (BACTROBAN) 2 % ointment Apply topically 3 (three) times daily. 1 Tube 0    potassium chloride (KLOR-CON) 10 MEQ TbSR Take 2 tablets (20 mEq total) by mouth 2 (two) times daily. 360 tablet 3    simvastatin (ZOCOR) 20 MG tablet Take 1 tablet (20 mg total) by mouth every evening. 90 tablet 3    SYMBICORT 80-4.5 mcg/actuation HFAA 2 puffs daily as needed.       terbinafine HCl (LAMISIL) 1 % cream Apply topically 2 (two) times daily. 42 g 3    travoprost, benzalkonium, (TRAVATAN) 0.004 % ophthalmic solution Place 1 drop into both eyes nightly. 5 mL 3    triamcinolone acetonide 0.1% (KENALOG) 0.1 % Lotn Apply topically 2 (two) times daily. 60 mL 3    white petrolatum-mineral oil (EUCERIN) Crea Apply topically as needed. 454 g 3     No current facility-administered medications on file prior to visit.      Social History     Socioeconomic History     Marital status:      Spouse name: Not on file    Number of children: 3    Years of education: Not on file    Highest education level: Not on file   Occupational History    Not on file   Social Needs    Financial resource strain: Not on file    Food insecurity:     Worry: Not on file     Inability: Not on file    Transportation needs:     Medical: Not on file     Non-medical: Not on file   Tobacco Use    Smoking status: Current Some Day Smoker     Packs/day: 0.50     Last attempt to quit: 2015     Years since quittin.1    Smokeless tobacco: Never Used    Tobacco comment: smokes once per month   Substance and Sexual Activity    Alcohol use: No    Drug use: No    Sexual activity: Not on file   Lifestyle    Physical activity:     Days per week: Not on file     Minutes per session: Not on file    Stress: Not on file   Relationships    Social connections:     Talks on phone: Not on file     Gets together: Not on file     Attends Jehovah's witness service: Not on file     Active member of club or organization: Not on file     Attends meetings of clubs or organizations: Not on file     Relationship status: Not on file   Other Topics Concern    Not on file   Social History Narrative    Not on file     No family history on file.      ROS:GENERAL: No fever, chills, fatigability or weight loss.  SKIN: No rashes, itching or changes in color or texture of skin.  HEAD: No headaches or recent head trauma.EYES: Visual acuity fine. No photophobia, ocular pain or diplopia.EARS: Denies ear pain, discharge or vertigo.NOSE: No loss of smell, no epistaxis or postnasal drip.MOUTH & THROAT: No hoarseness or change in voice. No excessive gum bleeding.NODES: Denies swollen glands.  CHEST: Denies CHESTER, cyanosis, wheezing, cough and sputum production.  CARDIOVASCULAR: Denies chest pain, PND, orthopnea or reduced exercise tolerance.  ABDOMEN: Appetite fine. No weight loss. Denies diarrhea, abdominal pain, hematemesis  or blood in stool.  URINARY: No flank pain, dysuria or hematuria.  PERIPHERAL VASCULAR: No claudication or cyanosis.  MUSCULOSKELETAL: See above.  NEUROLOGIC: No history of seizures, paralysis, alteration of gait or coordination.  PE:   HEAD: Normocephalic, atraumatic.EYES: PERRL. EOMI.   EARS: TM's intact. Light reflex normal. No retraction or perforation.   NOSE: Mucosa pink. Airway clear.MOUTH & THROAT: No tonsillar enlargement. No pharyngeal erythema or exudate. No stridor.  NODES: No cervical, axillary or inguinal lymph node enlargement.  CHEST: Lungs clear to auscultation except scattered ronchi   CARDIOVASCULAR: Normal S1, S2. No rubs, murmurs or gallops.  ABDOMEN: Bowel sounds normal. Not distended. Soft. No tenderness or masses.  MUSCULOSKELETAL: Tender bilateral paracervical paralumbar neg SLR, tender knees and shoulders lt injected     Lac healing no redness or dc    NEUROLOGIC: Cranial Nerves: II-XII grossly intact.  Motor: 5/5 strength major flexors/extensors.  DTR's: Knees, Ankles 2+ and equal bilaterally; downgoing toes.  Sensory: Decr sensations hands  Gait & Posture: Antalgic gait     Impression: Cerv stenosis  Shoulder arthropathy  Radiculopathy  Lt meniscal tear   HBP  HLP     Plan:   Rev med recs   Cont current BP tx   Rev pain management    Reviewed meds ,pain management ,risks benefits meds   Rev

## 2019-07-18 ENCOUNTER — HOSPITAL ENCOUNTER (OUTPATIENT)
Facility: HOSPITAL | Age: 64
Discharge: HOME | End: 2019-07-18
Attending: ANESTHESIOLOGY | Admitting: ANESTHESIOLOGY
Payer: MEDICARE

## 2019-07-18 ENCOUNTER — HOSPITAL ENCOUNTER (OUTPATIENT)
Dept: RADIOLOGY | Facility: HOSPITAL | Age: 64
Discharge: HOME | End: 2019-07-18
Attending: ANESTHESIOLOGY
Payer: MEDICARE

## 2019-07-18 VITALS
HEIGHT: 72 IN | BODY MASS INDEX: 27.77 KG/M2 | WEIGHT: 205 LBS | OXYGEN SATURATION: 99 % | SYSTOLIC BLOOD PRESSURE: 133 MMHG | HEART RATE: 57 BPM | RESPIRATION RATE: 20 BRPM | DIASTOLIC BLOOD PRESSURE: 79 MMHG | TEMPERATURE: 97 F

## 2019-07-18 DIAGNOSIS — M54.12 CERVICAL RADICULOPATHY: ICD-10-CM

## 2019-07-18 DIAGNOSIS — M54.12 CERVICAL RADICULOPATHY: Primary | ICD-10-CM

## 2019-07-18 DIAGNOSIS — M50.30 DDD (DEGENERATIVE DISC DISEASE), CERVICAL: ICD-10-CM

## 2019-07-18 PROCEDURE — 25000003 PHARM REV CODE 250: Mod: PO | Performed by: ANESTHESIOLOGY

## 2019-07-18 RX ORDER — SODIUM CHLORIDE, SODIUM LACTATE, POTASSIUM CHLORIDE, CALCIUM CHLORIDE 600; 310; 30; 20 MG/100ML; MG/100ML; MG/100ML; MG/100ML
INJECTION, SOLUTION INTRAVENOUS CONTINUOUS
Status: CANCELLED | OUTPATIENT
Start: 2019-07-23

## 2019-07-18 RX ORDER — SODIUM CHLORIDE, SODIUM LACTATE, POTASSIUM CHLORIDE, CALCIUM CHLORIDE 600; 310; 30; 20 MG/100ML; MG/100ML; MG/100ML; MG/100ML
INJECTION, SOLUTION INTRAVENOUS CONTINUOUS
Status: DISCONTINUED | OUTPATIENT
Start: 2019-07-18 | End: 2019-07-18 | Stop reason: HOSPADM

## 2019-07-18 RX ADMIN — SODIUM CHLORIDE, SODIUM LACTATE, POTASSIUM CHLORIDE, AND CALCIUM CHLORIDE: .6; .31; .03; .02 INJECTION, SOLUTION INTRAVENOUS at 12:07

## 2019-07-22 ENCOUNTER — TELEPHONE (OUTPATIENT)
Dept: SURGERY | Facility: HOSPITAL | Age: 64
End: 2019-07-22

## 2019-07-22 NOTE — TELEPHONE ENCOUNTER
"When calling patient, he stated that he felt he needed to "opt out" of his scheduled procedure tomorrow because his arm was giving him a lot of pain and the swelling had not went down. Patient asked that someone from the office call him please to reschedule. Thank you  "

## 2019-07-22 NOTE — TELEPHONE ENCOUNTER
Left voice message that we moved his injection to Aug 6. Advised to call the office back if this is not a good day.

## 2019-08-02 ENCOUNTER — TELEPHONE (OUTPATIENT)
Dept: SURGERY | Facility: HOSPITAL | Age: 64
End: 2019-08-02

## 2019-08-02 NOTE — TELEPHONE ENCOUNTER
When doing pre op call, patient stated he wants to cancel.  Will call back in a couple of months to reschedule

## 2019-08-08 ENCOUNTER — OFFICE VISIT (OUTPATIENT)
Dept: FAMILY MEDICINE | Facility: CLINIC | Age: 64
End: 2019-08-08
Payer: MEDICARE

## 2019-08-08 VITALS
TEMPERATURE: 98 F | DIASTOLIC BLOOD PRESSURE: 70 MMHG | BODY MASS INDEX: 26.98 KG/M2 | SYSTOLIC BLOOD PRESSURE: 121 MMHG | HEART RATE: 62 BPM | WEIGHT: 199.19 LBS | HEIGHT: 72 IN

## 2019-08-08 DIAGNOSIS — I10 BENIGN ESSENTIAL HTN: Primary | ICD-10-CM

## 2019-08-08 DIAGNOSIS — M54.12 CERVICAL RADICULOPATHY: ICD-10-CM

## 2019-08-08 DIAGNOSIS — M54.16 LUMBAR RADICULOPATHY: ICD-10-CM

## 2019-08-08 DIAGNOSIS — Z86.010 HX OF COLONIC POLYP: ICD-10-CM

## 2019-08-08 DIAGNOSIS — E78.49 OTHER HYPERLIPIDEMIA: ICD-10-CM

## 2019-08-08 DIAGNOSIS — M51.36 DDD (DEGENERATIVE DISC DISEASE), LUMBAR: ICD-10-CM

## 2019-08-08 DIAGNOSIS — M48.02 CERVICAL STENOSIS OF SPINE: ICD-10-CM

## 2019-08-08 PROCEDURE — 99213 OFFICE O/P EST LOW 20 MIN: CPT | Mod: PBBFAC,PO | Performed by: NURSE PRACTITIONER

## 2019-08-08 PROCEDURE — G0402 INITIAL PREVENTIVE EXAM: HCPCS | Mod: ,,, | Performed by: NURSE PRACTITIONER

## 2019-08-08 PROCEDURE — G0402 PR WELCOME MEDICARE PREVENTIVE VISIT NEW ENROLLEE: ICD-10-PCS | Mod: ,,, | Performed by: NURSE PRACTITIONER

## 2019-08-08 PROCEDURE — 99999 PR PBB SHADOW E&M-EST. PATIENT-LVL III: ICD-10-PCS | Mod: PBBFAC,,, | Performed by: NURSE PRACTITIONER

## 2019-08-08 PROCEDURE — 99999 PR PBB SHADOW E&M-EST. PATIENT-LVL III: CPT | Mod: PBBFAC,,, | Performed by: NURSE PRACTITIONER

## 2019-08-15 ENCOUNTER — OFFICE VISIT (OUTPATIENT)
Dept: PAIN MEDICINE | Facility: CLINIC | Age: 64
End: 2019-08-15
Payer: MEDICARE

## 2019-08-15 VITALS
DIASTOLIC BLOOD PRESSURE: 65 MMHG | BODY MASS INDEX: 27.81 KG/M2 | OXYGEN SATURATION: 98 % | SYSTOLIC BLOOD PRESSURE: 123 MMHG | HEART RATE: 60 BPM | WEIGHT: 205 LBS | RESPIRATION RATE: 18 BRPM | TEMPERATURE: 97 F

## 2019-08-15 DIAGNOSIS — M54.12 CERVICAL RADICULOPATHY: Primary | ICD-10-CM

## 2019-08-15 DIAGNOSIS — M48.02 CERVICAL STENOSIS OF SPINE: ICD-10-CM

## 2019-08-15 DIAGNOSIS — M48.062 SPINAL STENOSIS OF LUMBAR REGION WITH NEUROGENIC CLAUDICATION: ICD-10-CM

## 2019-08-15 DIAGNOSIS — M25.562 CHRONIC PAIN OF LEFT KNEE: ICD-10-CM

## 2019-08-15 DIAGNOSIS — G89.29 CHRONIC PAIN OF LEFT KNEE: ICD-10-CM

## 2019-08-15 PROBLEM — E78.49 OTHER HYPERLIPIDEMIA: Status: ACTIVE | Noted: 2019-08-15

## 2019-08-15 PROCEDURE — 99214 OFFICE O/P EST MOD 30 MIN: CPT | Mod: PBBFAC,PN | Performed by: ANESTHESIOLOGY

## 2019-08-15 PROCEDURE — 99213 OFFICE O/P EST LOW 20 MIN: CPT | Mod: S$PBB,,, | Performed by: ANESTHESIOLOGY

## 2019-08-15 PROCEDURE — 99999 PR PBB SHADOW E&M-EST. PATIENT-LVL IV: ICD-10-PCS | Mod: PBBFAC,,, | Performed by: ANESTHESIOLOGY

## 2019-08-15 PROCEDURE — 99213 PR OFFICE/OUTPT VISIT, EST, LEVL III, 20-29 MIN: ICD-10-PCS | Mod: S$PBB,,, | Performed by: ANESTHESIOLOGY

## 2019-08-15 PROCEDURE — 99999 PR PBB SHADOW E&M-EST. PATIENT-LVL IV: CPT | Mod: PBBFAC,,, | Performed by: ANESTHESIOLOGY

## 2019-08-15 NOTE — PROGRESS NOTES
Migue Sellers presented for a  Medicare AWV and comprehensive Health Risk Assessment today. The following components were reviewed and updated:    · Medical history  · Family History  · Social history  · Allergies and Current Medications  · Health Risk Assessment  · Health Maintenance  · Care Team     ** See Completed Assessments for Annual Wellness Visit within the encounter summary.**       The following assessments were completed:  · Living Situation  · CAGE  · Depression Screening  · Timed Get Up and Go  · Whisper Test  · Cognitive Function Screening  · Nutrition Screening  · ADL Screening  · PAQ Screening    Vitals:    08/08/19 1122   BP: 121/70   Pulse: 62   Temp: 98.4 °F (36.9 °C)   TempSrc: Oral   Weight: 90.4 kg (199 lb 3.2 oz)   Height: 6' (1.829 m)     Body mass index is 27.02 kg/m².  Physical Exam   Constitutional: He is oriented to person, place, and time. He appears well-developed. No distress.   HENT:   Head: Normocephalic and atraumatic.   Eyes: Pupils are equal, round, and reactive to light. EOM are normal.   Neck: Normal range of motion. Neck supple.   Cardiovascular: Normal rate and regular rhythm.   Pulmonary/Chest: Effort normal and breath sounds normal.   Musculoskeletal: Normal range of motion.   Neurological: He is alert and oriented to person, place, and time.   Skin: Skin is warm and dry. No rash noted.   Psychiatric: He has a normal mood and affect. Thought content normal.   Nursing note and vitals reviewed.        Diagnoses and health risks identified today and associated recommendations/orders:    1. Benign essential HTN  Stable and controlled on amlodipine, atenolol, hydrochlorothiazide  Continue medication as prescribed  Continue current treatment plan as previously prescribed with your PCP      2. Lumbar radiculopathy  Stable and controlled on gabapentin  Continue medication as prescribed  Continue current treatment plan as previously prescribed with your PCP      3. DDD (degenerative  disc disease), lumbar  Stable and controlled on ibuprofen, Norco  Continue medication as prescribed  Continue current treatment plan as previously prescribed with your PCP      4. Cervical stenosis of spine  Stable and controlled on Norco, gabapentin, ibuprofen  Continue medication as prescribed  Continue current treatment plan as previously prescribed with your PCP      5. Cervical radiculopathy  Stable and controlled on gabapentin  Continue medication as prescribed  Continue current treatment plan as previously prescribed with your PCP      6. Hx of colonic polyp  Stable - routine colonoscopy    7. Hyperlipidemia  Stable and controlled on simvastatin  Continue medication as prescribed  Continue current treatment plan as previously prescribed with your PCP      Provided Don with a 5-10 year written screening schedule and personal prevention plan. Recommendations were developed using the USPSTF age appropriate recommendations. Education, counseling, and referrals were provided as needed. After Visit Summary printed and given to patient which includes a list of additional screenings\tests needed.    Follow up if symptoms worsen or fail to improve.    Elliot Scherer NP

## 2019-08-15 NOTE — PROGRESS NOTES
This note was completed with dictation software and grammatical errors may exist.    CC: Neck pain, back pain    HPI: The patient is a 63-year-old man with a history of hypertension, chronic neck and back pain who presents in referral from [facility] for continued neck and back pain. He returns in follow-up today, he head come for cervical epidural steroid injection several weeks ago but apparently had some difficulties with starting his IV.  Eventually and IV was placed that infiltrated and he had swelling in his right arm.  He decided at that point that he would leave and reschedule.  He comes back today complaining of neck pain radiating into the left arm, worse with turning his head side to side.  Otherwise denies any new weakness or numbness, no bowel or bladder incontinence.  He complains of bilateral knee pain, has injections scheduled for his bilateral knees in several weeks on 08/27/2019.      Pain intervention history: [facility] gabapentin, Robaxin, capsaicin cream for his neck.  He has had lumbar epidural steroid injections but nothing for his neck.  He is status post C7-T1 cervical interlaminar epidural steroid injection on 8/22/16 with what sounds like moderate relief.  He is status post C7-T1 cervical interlaminar epidural steroid on 7/24/17 with 25% relief.  He is not interested in doing any physical therapy.  He is status post C7-T1 cervical interlaminar epidural steroid injection on 9/12/17 with what sounds like excellent relief.  He is status post L5/S1 interlaminar epidural steroid injection on 10/31/17 with 30% relief.  He is status post L5/S1 interlaminar epidural steroid injection on 1/15/18 with 40% relief.  He is status post C7-T1 cervical interlaminar epidural steroid injection on 02/04/2019 with 40% relief.      From 1/24/2019 visit:  [facility] who suggested against cervical or lumbar spine surgery.  The  patient also asked him about an opioid pump and spinal cord stimulation. He states the surgeon also recommended against these due to the possibility of developing keloids.     ROS: He reports back pain, neck pain.  Balance of review of systems is negative.    Medical, surgical, family and social history reviewed elsewhere in record.    Medications/Allergies: See med card    Vitals:    08/15/19 1100   BP: 123/65   Pulse: 60   Resp: 18   Temp: 96.8 °F (36 °C)   TempSrc: Oral   SpO2: 98%   Weight: 93 kg (205 lb 0.4 oz)   PainSc:   4   PainLoc: Neck         Physical exam:  Gen: A and O x3, pleasant, well-groomed  Skin: No rashes or obvious lesions  HEENT: PERRLA, no obvious deformities on ears or in canals.Trachea midline.  CVS: Regular rate and rhythm, normal palpable pulses.  Resp: Clear to auscultation bilaterally, no wheezes or rales.  Abdomen: Soft, NT/ND.  Musculoskeletal:  Antalgic gait due to left knee pain.  Wearing a knee brace.    Neuro:  Upper extremities: 5/5 strength bilaterally   Lower extremities: 5/5 strength bilaterally  Reflexes: Brachioradialis 2+, Bicep 2+, Tricep 2+.  Patellar 2+, Achilles 2+ bilaterally.  Sensory: Intact and symmetrical to light touch and pinprick in C2-T1 dermatomes bilaterally.  Intact and symmetrical to light touch and pinprick in L2-S1 dermatomes bilaterally.    Cervical Spine:  Cervical spine: ROM is moderately limited with left lateral rotation with increased left neck and left trapezius muscle pain. Range of motion has mildly improved.  Range of motion is mildly limited with flexion, extension right lateral rotation with mild increased left neck pain.  Spurling's maneuver causes no neck pain to either side.  Myofascial exam:  No tenderness to palpation to the cervical paraspinous muscles.    Lumbar spine:  Lumbar spine: ROM is mildly limited with flexion moderately limited with extension and oblique extension with increased low back pain during each maneuver but especially  with extension.  Juma's test causes no increased pain on either side.    Supine straight leg raise causes posterior thigh pain bilaterally.  Internal and external rotation of the hip causes no increased pain on either side.  Myofascial exam: No tenderness to palpation across lumbar paraspinous muscles.      Imaging:  MRI cervical spine 5/16/16: C1/2 normal.  C2/3 mild facet arthropathy.  At C3/4 mild facet arthropathy.  C4/5 mild small broad-based posterior disc bulge and facet arthrosis resulting in ventral CSF space effacement and mild ventral cord flattening.  At C5/6 there is broad-based disc bulge with focal left paracentral disc protrusion bilateral facet arthrosis and left uncovertebral spurring resulting in severe left neural foraminal narrowing, ventral CSF space effacement and mild ventral cord flattening.  At C6/7 there is broad-based posterior disc bulge and osteophytes bilateral uncovertebral spurring and facet arthropathy resulting in partial ventral CSF space effacement, mild ventral cord flattening and severe bilateral foraminal stenosis.  At C7/T1 there is broad-based posterior disc bulge with bilateral facet arthrosis and left uncovertebral spurring resulting in partial CSF space effacement and ventral cord flattening and severe left neural foraminal stenosis.    CT lumbar spine report 7/15/11  L2-3 mild disc bulge  L3-4 mild bulging of the disc with flattening of the ventral thecal sac resulting in mild narrowing of the foramina  L4-5 mild bulging of the disc is present along with ligamentum flavum hypertrophy combine to result in a moderate degree of canal stenosis with mild foraminal narrowing    MRI lumbar spine 8/25/17  At the T12-L1 normal, mild ligamentous hypertrophy is noted, no significant central canal stenosis or neural foraminal stenosis is noted.  At the L1-L2 level, mild ligamentous hypertrophy appears to be present,  no significant disk bulge, central canal stenosis, or neural  foraminal stenosis is noted  At L2-L3 level, broad-based bulge or protrusion appears to be present of 4 mm with asymmetric bulging towards each neural foramen. Mild bilateral neural foraminal stenosis is noted. Mild central canal narrowing is noted to 9 mm. Mild bilateral lateral recess narrowing is noted.  At L3-L4 level, asymmetric bulging is noted towards the left neural foramen slightly greater than right of approximately 3 mm, mild left greater than right neural foraminal narrowing is noted without significant central canal stenosis. Mild facet arthropathy and ligamentous hypertrophy is noted.  At the L4-L5 level, broad-based protrusion appears to be present centrally of 6 mm with increased T2 signal suggestive of an annular tear. Ligamentous hypertrophy and facet arthropathy appears to be present. Moderate central canal stenosis appears to be present with bunching of the nerve roots and central canal narrowing to 7 mm. Bilateral lateral recess stenosis is noted. Mild/moderate bilateral neural foraminal narrowing is noted slightly greater to the right than left.  At the L5-S1 level, broad-based bulge is noted centrally of 3 mm with increased T2 signal this can be seen with an annular tear, mild left greater than right neural foraminal stenosis appears to be present. Mild central canal narrowing is noted.      Assessment:  The patient is a 63-year-old man with a history of hypertension, chronic neck and back pain who presents in referral from [facility] for continued neck and back pain.   1. Cervical radiculopathy     2. Cervical stenosis of spine     3. Chronic pain of left knee     4. Spinal stenosis of lumbar region with neurogenic claudication         Plan:  1.  He is having the same symptoms, no major changes.  I will have him return for cervical epidural steroid injection about three weeks after he has had his knee injections.  I will then have him follow up in three weeks afterwards.

## 2019-09-24 DIAGNOSIS — M50.30 DDD (DEGENERATIVE DISC DISEASE), CERVICAL: Primary | ICD-10-CM

## 2019-09-25 ENCOUNTER — HOSPITAL ENCOUNTER (OUTPATIENT)
Facility: HOSPITAL | Age: 64
Discharge: HOME | End: 2019-09-25
Attending: ANESTHESIOLOGY | Admitting: ANESTHESIOLOGY
Payer: MEDICARE

## 2019-09-25 ENCOUNTER — HOSPITAL ENCOUNTER (OUTPATIENT)
Dept: RADIOLOGY | Facility: HOSPITAL | Age: 64
Discharge: HOME | End: 2019-09-25
Attending: ANESTHESIOLOGY
Payer: MEDICARE

## 2019-09-25 VITALS
TEMPERATURE: 97 F | WEIGHT: 193 LBS | DIASTOLIC BLOOD PRESSURE: 68 MMHG | HEIGHT: 72 IN | HEART RATE: 57 BPM | SYSTOLIC BLOOD PRESSURE: 127 MMHG | RESPIRATION RATE: 16 BRPM | BODY MASS INDEX: 26.14 KG/M2 | OXYGEN SATURATION: 99 %

## 2019-09-25 DIAGNOSIS — M50.30 DDD (DEGENERATIVE DISC DISEASE), CERVICAL: ICD-10-CM

## 2019-09-25 DIAGNOSIS — M54.12 CERVICAL RADICULOPATHY: Primary | ICD-10-CM

## 2019-09-25 PROCEDURE — 62321 NJX INTERLAMINAR CRV/THRC: CPT | Mod: ,,, | Performed by: ANESTHESIOLOGY

## 2019-09-25 PROCEDURE — 62321 PR INJ CERV/THORAC, W/GUIDANCE: ICD-10-PCS | Mod: ,,, | Performed by: ANESTHESIOLOGY

## 2019-09-25 PROCEDURE — 25500020 PHARM REV CODE 255: Mod: PO | Performed by: ANESTHESIOLOGY

## 2019-09-25 PROCEDURE — 76000 FLUOROSCOPY <1 HR PHYS/QHP: CPT | Mod: TC,PO

## 2019-09-25 PROCEDURE — 25000003 PHARM REV CODE 250: Mod: PO | Performed by: ANESTHESIOLOGY

## 2019-09-25 PROCEDURE — 99152 MOD SED SAME PHYS/QHP 5/>YRS: CPT | Mod: ,,, | Performed by: ANESTHESIOLOGY

## 2019-09-25 PROCEDURE — 63600175 PHARM REV CODE 636 W HCPCS: Mod: PO | Performed by: ANESTHESIOLOGY

## 2019-09-25 PROCEDURE — 62321 NJX INTERLAMINAR CRV/THRC: CPT | Mod: PO | Performed by: ANESTHESIOLOGY

## 2019-09-25 PROCEDURE — A4216 STERILE WATER/SALINE, 10 ML: HCPCS | Mod: PO | Performed by: ANESTHESIOLOGY

## 2019-09-25 PROCEDURE — 99152 PR MOD CONSCIOUS SEDATION, SAME PHYS, 5+ YRS, FIRST 15 MIN: ICD-10-PCS | Mod: ,,, | Performed by: ANESTHESIOLOGY

## 2019-09-25 RX ORDER — LIDOCAINE HYDROCHLORIDE 10 MG/ML
1 INJECTION INFILTRATION; PERINEURAL ONCE
Status: COMPLETED | OUTPATIENT
Start: 2019-09-25 | End: 2019-09-25

## 2019-09-25 RX ORDER — METHYLPREDNISOLONE ACETATE 80 MG/ML
INJECTION, SUSPENSION INTRA-ARTICULAR; INTRALESIONAL; INTRAMUSCULAR; SOFT TISSUE
Status: DISCONTINUED | OUTPATIENT
Start: 2019-09-25 | End: 2019-09-25 | Stop reason: HOSPADM

## 2019-09-25 RX ORDER — SODIUM CHLORIDE, SODIUM LACTATE, POTASSIUM CHLORIDE, CALCIUM CHLORIDE 600; 310; 30; 20 MG/100ML; MG/100ML; MG/100ML; MG/100ML
INJECTION, SOLUTION INTRAVENOUS CONTINUOUS
Status: DISCONTINUED | OUTPATIENT
Start: 2019-09-25 | End: 2019-09-25 | Stop reason: HOSPADM

## 2019-09-25 RX ORDER — SODIUM CHLORIDE 9 MG/ML
INJECTION, SOLUTION INTRAMUSCULAR; INTRAVENOUS; SUBCUTANEOUS
Status: DISCONTINUED | OUTPATIENT
Start: 2019-09-25 | End: 2019-09-25 | Stop reason: HOSPADM

## 2019-09-25 RX ORDER — MIDAZOLAM HYDROCHLORIDE 1 MG/ML
INJECTION INTRAMUSCULAR; INTRAVENOUS
Status: DISCONTINUED | OUTPATIENT
Start: 2019-09-25 | End: 2019-09-25 | Stop reason: HOSPADM

## 2019-09-25 RX ORDER — LIDOCAINE HYDROCHLORIDE 10 MG/ML
INJECTION, SOLUTION EPIDURAL; INFILTRATION; INTRACAUDAL; PERINEURAL
Status: DISCONTINUED | OUTPATIENT
Start: 2019-09-25 | End: 2019-09-25 | Stop reason: HOSPADM

## 2019-09-25 RX ADMIN — SODIUM CHLORIDE, SODIUM LACTATE, POTASSIUM CHLORIDE, AND CALCIUM CHLORIDE: .6; .31; .03; .02 INJECTION, SOLUTION INTRAVENOUS at 03:09

## 2019-09-25 RX ADMIN — LIDOCAINE HYDROCHLORIDE: 10 INJECTION, SOLUTION EPIDURAL; INFILTRATION; INTRACAUDAL; PERINEURAL at 03:09

## 2019-09-25 NOTE — H&P
CC: Neck pain    HPI: The patient is a 65yo man with a history of cervical radiculopathy here for cervical SOLIS. There are no major changes in history and physical from 8/15/19.    Past Medical History:   Diagnosis Date    Anticoagulant long-term use     aspirn    Asthma     Cervical stenosis of spine     DDD (degenerative disc disease), lumbar     Depression     DJD (degenerative joint disease)     Hyperlipidemia     Hypertension     Intervertebral disc protrusion     Seasonal allergies     Skin abnormalities        Past Surgical History:   Procedure Laterality Date    cervical injection      COLONOSCOPY      EPIDURAL STEROID INJECTION INTO CERVICAL SPINE N/A 2019    Procedure: Injection-steroid-epidural-cervical;  Surgeon: Phil Ann MD;  Location: [facility] OR;  Service: Pain Management;  Laterality: N/A;  to the LEFT    EYE SURGERY      MULTIPLE TOOTH EXTRACTIONS         History reviewed. No pertinent family history.    Social History     Socioeconomic History    Marital status:      Spouse name: Not on file    Number of children: 3    Years of education: Not on file    Highest education level: Not on file   Occupational History    Not on file   Social Needs    Financial resource strain: Not on file    Food insecurity:     Worry: Not on file     Inability: Not on file    Transportation needs:     Medical: Not on file     Non-medical: Not on file   Tobacco Use    Smoking status: Current Some Day Smoker     Packs/day: 0.25     Last attempt to quit: 2015     Years since quittin.3    Smokeless tobacco: Never Used    Tobacco comment: smokes once per month   Substance and Sexual Activity    Alcohol use: No    Drug use: No    Sexual activity: Not on file   Lifestyle    Physical activity:     Days per week: Not on file     Minutes per session: Not on file    Stress: Not on file   Relationships    Social connections:     Talks on phone: Not on file     Gets  together: Not on file     Attends Church service: Not on file     Active member of club or organization: Not on file     Attends meetings of clubs or organizations: Not on file     Relationship status: Not on file   Other Topics Concern    Not on file   Social History Narrative    Not on file       Current Facility-Administered Medications   Medication Dose Route Frequency Provider Last Rate Last Dose    lactated ringers infusion   Intravenous Continuous Phil Ann MD           Review of patient's allergies indicates:   Allergen Reactions    Ace inhibitors Swelling       Vitals:    09/25/19 1525   BP: (!) 141/77   Pulse: (!) 57   Resp: 16   Temp: 97.7 °F (36.5 °C)   TempSrc: Skin   SpO2: 100%   Weight: 87.5 kg (193 lb)   Height: 6' (1.829 m)       REVIEW OF SYSTEMS:     GENERAL: No weight loss, malaise or fevers.  HEENT:  No recent changes in vision or hearing  NECK: Negative for lumps, no difficulty with swallowing.  RESPIRATORY: Negative for cough, wheezing or shortness of breath, patient denies any recent URI.  CARDIOVASCULAR: Negative for chest pain, leg swelling or palpitations.  GI: Negative for abdominal discomfort, blood in stools or black stools or change in bowel habits.  MUSCULOSKELETAL: See HPI.  SKIN: Negative for lesions, rash, and itching.  PSYCH: No suicidal or homicidal ideations, no current mood disturbances.  HEMATOLOGY/LYMPHOLOGY: Negative for prolonged bleeding, bruising easily or swollen nodes. Patient is not currently taking any anti-coagulants  ENDO: No history of diabetes or thyroid dysfunction  NEURO: No history of syncope, paralysis, seizures or tremors.All other reviewed and negative other than HPI.    Physical exam:  Gen: A and O x3, pleasant, well-groomed  Skin: No rashes or obvious lesions  HEENT: PERRLA, no obvious deformities on ears or in canals. No thyroid masses, trachea midline, no palpable lymph nodes in neck, axilla.  CVS: Regular rate and rhythm, normal S1 and  S2, no murmurs.  Resp: Clear to auscultation bilaterally.  Abdomen: Soft, NT/ND, normal bowel sounds present.  Musculoskeletal/Neuro: Moving all extremities      ASA 2, mallampati 2    Assessment:  Cervical radiculopathy  -     Case Request Operating Room: Injection-steroid-epidural-cervical  -     Place in Outpatient; Standing  -     Diet NPO; Standing  -     lactated ringers infusion  -     Notify physician ; Standing  -     Notify physician ; Standing  -     Notify physician (specify); Standing  -     Place 18-22 gaua peripheral IV ; Standing  -     Verify informed consent; Standing  -     Vital signs; Standing    Other orders  -     IP VTE LOW RISK PATIENT; Standing

## 2019-09-25 NOTE — DISCHARGE SUMMARY
[facility]  Discharge Note  Short Stay    Admit Date: 9/25/2019    Discharge Date: 9/25/2019    Attending Physician: Phil Ann MD     Discharge Provider: Phil Ann    Diagnoses:  Active Hospital Problems    Diagnosis  POA    *Cervical radiculopathy [M54.12]  Yes      Resolved Hospital Problems   No resolved problems to display.       Discharged Condition: good    Final Diagnoses: Cervical radiculopathy [M54.12]    Disposition: Home or Self Care    Hospital Course: no complications, uneventful    Outcome of Hospitalization, Treatment, Procedure, or Surgery:  Patient was admitted for outpatient procedure. The patient underwent procedure without complications and are discharged home    Follow up/Patient Instructions:  Follow up as scheduled in Pain Management clinic in 3-4 weeks/Patient has received instructions and follow up date and time    Medications:  Continue previous medications    Discharge Procedure Orders   Call MD for:  temperature >100.4     Call MD for:  severe uncontrolled pain     Call MD for:  redness, tenderness, or signs of infection (pain, swelling, redness, odor or green/yellow discharge around incision site)     Call MD for:  severe persistent headache     No dressing needed         Discharge Procedure Orders (must include Diet, Follow-up, Activity):   Discharge Procedure Orders (must include Diet, Follow-up, Activity)   Call MD for:  temperature >100.4     Call MD for:  severe uncontrolled pain     Call MD for:  redness, tenderness, or signs of infection (pain, swelling, redness, odor or green/yellow discharge around incision site)     Call MD for:  severe persistent headache     No dressing needed

## 2019-09-25 NOTE — DISCHARGE INSTRUCTIONS
PAIN MANAGEMENT    Home care instructions   Apply ice pack to the injection site for 20 minute prior for the first 24 hours for soreness/discomfort at injection site   DO NOT USE HEAT FOR 24 HOURS   Keep site clean and dry for 24 hours, remove bandaid when desired   Do not drive until tomorrow  Take care when walking after a lumbar injection     STEROIDS OR RADIOFREQUENCY    May take 10-14 days for full effects  Avoid strenuous exercises for 2 days  Resume Aspirin, Plavix, or Coumadin the day after the procedure unless other wise instructed  Resume home medication as prescribed today      CALL PHYSICIAN FOR:   Severe increase in your usual pain or appearance of new pain   Prolonged or increasing weakness or numbness in the legs or arms   Fever greater then 100 degrees F..   Drainage from the incision site, redness, active bleeding or increased swelling at the injection site   Headache that increases when your head is upright and decreases when you lie flat    FOR EMERGENCIES:   Go directly to Emergency Department for Shortness of breath, chest pain, or problems breathing      Recovery After Procedural Sedation (Adult)  You have been given medicine by vein to make you sleep during your surgery. This may have included both a pain medicine and sleeping medicine. Most of the effects have worn off. But you may still have some drowsiness for the next 6 to 8 hours.  Home care  Follow these guidelines when you get home:  · For the next 8 hours, you should be watched by a responsible adult. This person should make sure your condition is not getting worse.  · Don't drink any alcohol for the next 24 hours.  · Don't drive, operate dangerous machinery, or make important business or personal decisions during the next 24 hours.  Note: Your healthcare provider may tell you not to take any medicine by mouth for pain or sleep in the next 4 hours. These medicines may react with the medicines you were given in the hospital.  This could cause a much stronger response than usual.  Follow-up care  Follow up with your healthcare provider if you are not alert and back to your usual level of activity within 12 hours.  When to seek medical advice  Call your healthcare provider right away if any of these occur:  · Drowsiness gets worse  · Weakness or dizziness gets worse  · Repeated vomiting  · You can't be awakened   Date Last Reviewed: 10/18/2016  © 1396-3691 [employer]. [address] [place] [address], [place] [address]. All rights reserved. This information is not intended as a substitute for professional medical care. Always follow your healthcare professional's instructions.

## 2019-09-25 NOTE — OP NOTE
PROCEDURE DATE: 9/25/2019    Procedure: C7-T1 cervical interlaminar epidural steroid injection under utilizing fluoroscopy.    Diagnosis: Cervical Radiculopathy    POSTOP DIAGNOSIS: SAME    Physician: Phil Ann MD    Medications injected:  Methylprednisone 80mg followed by a slow injection of 4 mL sterile, preservative-free normal saline.    Local anesthetic used: Lidocaine 1%, 4 ml.    Sedation Medications: 2mg versed    Complications:  none    Estimated blood loss: none    Technique:  A time-out was taken to identify patient and procedure prior to starting the procedure.  With the patient laying in a prone position with the neck in a mid-flexed forward position, the area was prepped and draped in the usual sterile fashion using ChloraPrep and a fenestrated drape.  The area was determined under AP fluoroscopic guidance.  Local anesthetic was given using a 25-gauge 1.5 inch needle by raising a wheal and then infiltrating ventrally.  A 3.5 inch 20-gauge Touhy needle was introduced under fluoroscopic guidance to meet the lamina of C7.  The needle was then hinged under the lamina then advanced using loss of resistance technique.  Once the tip of the needle was in the desired position, the contrast dye Omnipaque was injected to determine placement and no uptake.  The steroid was then injected slowly followed by a slow injection of 4 mL of the sterile preservative-free normal saline.  The patient tolerated the procedure well.    The patient was monitored after the procedure and was given post-procedure and discharge instructions to follow at home. The patient was discharged in a stable condition.

## 2019-09-30 ENCOUNTER — PATIENT OUTREACH (OUTPATIENT)
Dept: ADMINISTRATIVE | Facility: HOSPITAL | Age: 64
End: 2019-09-30

## 2019-10-14 ENCOUNTER — OFFICE VISIT (OUTPATIENT)
Dept: FAMILY MEDICINE | Facility: CLINIC | Age: 64
End: 2019-10-14
Payer: MEDICARE

## 2019-10-14 VITALS
SYSTOLIC BLOOD PRESSURE: 124 MMHG | TEMPERATURE: 98 F | HEART RATE: 58 BPM | DIASTOLIC BLOOD PRESSURE: 70 MMHG | WEIGHT: 206.81 LBS | BODY MASS INDEX: 28.01 KG/M2 | HEIGHT: 72 IN

## 2019-10-14 DIAGNOSIS — M48.02 CERVICAL STENOSIS OF SPINE: ICD-10-CM

## 2019-10-14 DIAGNOSIS — M51.36 DDD (DEGENERATIVE DISC DISEASE), LUMBAR: ICD-10-CM

## 2019-10-14 DIAGNOSIS — E78.5 HYPERLIPIDEMIA, UNSPECIFIED HYPERLIPIDEMIA TYPE: Primary | ICD-10-CM

## 2019-10-14 DIAGNOSIS — M54.12 CERVICAL RADICULOPATHY: ICD-10-CM

## 2019-10-14 PROCEDURE — 99999 PR PBB SHADOW E&M-EST. PATIENT-LVL III: ICD-10-PCS | Mod: PBBFAC,,, | Performed by: FAMILY MEDICINE

## 2019-10-14 PROCEDURE — 99213 OFFICE O/P EST LOW 20 MIN: CPT | Mod: PBBFAC,PO | Performed by: FAMILY MEDICINE

## 2019-10-14 PROCEDURE — 99214 PR OFFICE/OUTPT VISIT, EST, LEVL IV, 30-39 MIN: ICD-10-PCS | Mod: S$PBB,,, | Performed by: FAMILY MEDICINE

## 2019-10-14 PROCEDURE — 99214 OFFICE O/P EST MOD 30 MIN: CPT | Mod: S$PBB,,, | Performed by: FAMILY MEDICINE

## 2019-10-14 PROCEDURE — 99999 PR PBB SHADOW E&M-EST. PATIENT-LVL III: CPT | Mod: PBBFAC,,, | Performed by: FAMILY MEDICINE

## 2019-10-14 RX ORDER — HYDROCODONE BITARTRATE AND ACETAMINOPHEN 10; 325 MG/1; MG/1
1 TABLET ORAL 4 TIMES DAILY
Qty: 360 TABLET | Refills: 0 | Status: SHIPPED | OUTPATIENT
Start: 2019-10-14 | End: 2020-01-09 | Stop reason: SDUPTHER

## 2019-10-14 RX ORDER — AMOXICILLIN AND CLAVULANATE POTASSIUM 875; 125 MG/1; MG/1
1 TABLET, FILM COATED ORAL 2 TIMES DAILY
Qty: 28 TABLET | Refills: 0 | Status: SHIPPED | OUTPATIENT
Start: 2019-10-14 | End: 2019-10-28

## 2019-10-14 NOTE — PROGRESS NOTES
The patient presents today fu chronic pain management generally tolerating w HC 10    Hx cervical stenosis and lumbar disc disease w chonic pain and radiculopathy. He has DJD todd bilateral knees. Lt knee is getting worse  HBP and HLP controlled     Past Medical History:  Past Medical History:   Diagnosis Date    Anticoagulant long-term use     aspirn    Asthma     Cervical stenosis of spine     DDD (degenerative disc disease), lumbar     Depression     DJD (degenerative joint disease)     Hyperlipidemia     Hypertension     Intervertebral disc protrusion     Seasonal allergies     Skin abnormalities      Past Surgical History:   Procedure Laterality Date    cervical injection      COLONOSCOPY      EPIDURAL STEROID INJECTION INTO CERVICAL SPINE N/A 2/4/2019    Procedure: Injection-steroid-epidural-cervical;  Surgeon: Phil Ann MD;  Location: [facility] OR;  Service: Pain Management;  Laterality: N/A;  to the LEFT    EPIDURAL STEROID INJECTION INTO CERVICAL SPINE N/A 9/25/2019    Procedure: Injection-steroid-epidural-cervical;  Surgeon: Phil Ann MD;  Location: [facility] OR;  Service: Pain Management;  Laterality: N/A;    EYE SURGERY      MULTIPLE TOOTH EXTRACTIONS       Review of patient's allergies indicates:   Allergen Reactions    Ace inhibitors Swelling     Current Outpatient Medications on File Prior to Visit   Medication Sig Dispense Refill    albuterol 90 mcg/actuation inhaler Inhale 2 puffs into the lungs every 6 (six) hours as needed for Wheezing. 54 g 4    amlodipine (NORVASC) 10 MG tablet Take 1 tablet (10 mg total) by mouth once daily. 90 tablet 3    aspirin 81 MG Chew Take 1 tablet (81 mg total) by mouth once daily. 90 tablet 3    atenolol (TENORMIN) 25 MG tablet Take 0.5 tablets (12.5 mg total) by mouth once daily. 90 tablet 3    diltiaZEM (DILACOR XR) 180 MG CDCR Take 1 capsule (180 mg total) by mouth 2 (two) times daily. 180 capsule 3    gabapentin (NEURONTIN) 300 MG  capsule Take 2 capsules (600 mg total) by mouth 3 (three) times daily. 540 capsule 3    hydrochlorothiazide (HYDRODIURIL) 12.5 MG Tab Take 1 tablet (12.5 mg total) by mouth once daily. 90 tablet 3    HYDROcodone-acetaminophen (NORCO)  mg per tablet Take 1 tablet by mouth 4 (four) times daily. 360 tablet 0    ibuprofen (ADVIL,MOTRIN) 800 MG tablet Take 1 tablet (800 mg total) by mouth 4 (four) times daily. 360 tablet 3    loratadine (CLARITIN) 10 mg tablet Take 1 tablet (10 mg total) by mouth once daily. 90 tablet 3    LUBRICANT EYE, POLYV ALCOHOL, 1.4 % ophthalmic solution       meloxicam (MOBIC) 15 MG tablet Take 0.5 tablets (7.5 mg total) by mouth once daily. 90 tablet 3    mupirocin (BACTROBAN) 2 % ointment Apply topically 3 (three) times daily. 1 Tube 0    potassium chloride (KLOR-CON) 10 MEQ TbSR Take 2 tablets (20 mEq total) by mouth 2 (two) times daily. 360 tablet 3    simvastatin (ZOCOR) 20 MG tablet Take 1 tablet (20 mg total) by mouth every evening. 90 tablet 3    SYMBICORT 80-4.5 mcg/actuation HFAA 2 puffs daily as needed.       travoprost, benzalkonium, (TRAVATAN) 0.004 % ophthalmic solution Place 1 drop into both eyes nightly. 5 mL 3     No current facility-administered medications on file prior to visit.      Social History     Socioeconomic History    Marital status:      Spouse name: Not on file    Number of children: 3    Years of education: Not on file    Highest education level: Not on file   Occupational History    Not on file   Social Needs    Financial resource strain: Not on file    Food insecurity:     Worry: Not on file     Inability: Not on file    Transportation needs:     Medical: Not on file     Non-medical: Not on file   Tobacco Use    Smoking status: Current Some Day Smoker     Packs/day: 0.25     Last attempt to quit: 2015     Years since quittin.4    Smokeless tobacco: Never Used    Tobacco comment: smokes once per month   Substance and  Sexual Activity    Alcohol use: No    Drug use: No    Sexual activity: Not on file   Lifestyle    Physical activity:     Days per week: Not on file     Minutes per session: Not on file    Stress: Not on file   Relationships    Social connections:     Talks on phone: Not on file     Gets together: Not on file     Attends Sabianism service: Not on file     Active member of club or organization: Not on file     Attends meetings of clubs or organizations: Not on file     Relationship status: Not on file   Other Topics Concern    Not on file   Social History Narrative    Not on file     No family history on file.      ROS:GENERAL: No fever, chills, fatigability or weight loss.  SKIN: No rashes, itching or changes in color or texture of skin.  HEAD: No headaches or recent head trauma.EYES: Visual acuity fine. No photophobia, ocular pain or diplopia.EARS: Denies ear pain, discharge or vertigo.NOSE: No loss of smell, no epistaxis or postnasal drip.MOUTH & THROAT: No hoarseness or change in voice. No excessive gum bleeding.NODES: Denies swollen glands.  CHEST: Denies CHETSER, cyanosis, wheezing, cough and sputum production.  CARDIOVASCULAR: Denies chest pain, PND, orthopnea or reduced exercise tolerance.  ABDOMEN: Appetite fine. No weight loss. Denies diarrhea, abdominal pain, hematemesis or blood in stool.  URINARY: No flank pain, dysuria or hematuria.  PERIPHERAL VASCULAR: No claudication or cyanosis.  MUSCULOSKELETAL: See above.  NEUROLOGIC: No history of seizures, paralysis, alteration of gait or coordination.  PE:   HEAD: Normocephalic, atraumatic.EYES: PERRL. EOMI.   EARS: TM's intact. Light reflex normal. No retraction or perforation.   NOSE: Mucosa pink. Airway clear.MOUTH & THROAT: No tonsillar enlargement. No pharyngeal erythema or exudate. No stridor.  NODES: No cervical, axillary or inguinal lymph node enlargement.  CHEST: Lungs clear to auscultation except scattered ronchi   CARDIOVASCULAR: Normal S1, S2. No  rubs, murmurs or gallops.  ABDOMEN: Bowel sounds normal. Not distended. Soft. No tenderness or masses.  MUSCULOSKELETAL: Tender bilateral paracervical paralumbar neg SLR, tender knees and shoulders lt injected     Lac healing no redness or dc    NEUROLOGIC: Cranial Nerves: II-XII grossly intact.  Motor: 5/5 strength major flexors/extensors.  DTR's: Knees, Ankles 2+ and equal bilaterally; downgoing toes.  Sensory: Decr sensations hands  Gait & Posture: Antalgic gait     Impression: Cerv stenosis  Shoulder arthropathy  Radiculopathy  Lt meniscal tear   HBP  HLP     Plan:   Rev med recs   Cont current BP tx   Rev pain management    Reviewed meds ,pain management ,risks benefits meds   Rev

## 2019-10-23 ENCOUNTER — OFFICE VISIT (OUTPATIENT)
Dept: PAIN MEDICINE | Facility: CLINIC | Age: 64
End: 2019-10-23
Payer: MEDICARE

## 2019-10-23 VITALS
RESPIRATION RATE: 20 BRPM | HEART RATE: 64 BPM | WEIGHT: 207.88 LBS | DIASTOLIC BLOOD PRESSURE: 74 MMHG | BODY MASS INDEX: 28.2 KG/M2 | OXYGEN SATURATION: 99 % | SYSTOLIC BLOOD PRESSURE: 133 MMHG | TEMPERATURE: 97 F

## 2019-10-23 DIAGNOSIS — M54.16 LUMBAR RADICULOPATHY: Primary | ICD-10-CM

## 2019-10-23 DIAGNOSIS — M25.562 CHRONIC PAIN OF LEFT KNEE: ICD-10-CM

## 2019-10-23 DIAGNOSIS — G89.29 CHRONIC PAIN OF LEFT KNEE: ICD-10-CM

## 2019-10-23 DIAGNOSIS — M54.12 CERVICAL RADICULOPATHY: ICD-10-CM

## 2019-10-23 DIAGNOSIS — M48.062 SPINAL STENOSIS OF LUMBAR REGION WITH NEUROGENIC CLAUDICATION: ICD-10-CM

## 2019-10-23 DIAGNOSIS — M48.02 CERVICAL STENOSIS OF SPINE: ICD-10-CM

## 2019-10-23 PROCEDURE — 99999 PR PBB SHADOW E&M-EST. PATIENT-LVL IV: ICD-10-PCS | Mod: PBBFAC,,, | Performed by: PHYSICIAN ASSISTANT

## 2019-10-23 PROCEDURE — 99214 OFFICE O/P EST MOD 30 MIN: CPT | Mod: PBBFAC,PN | Performed by: PHYSICIAN ASSISTANT

## 2019-10-23 PROCEDURE — 99214 OFFICE O/P EST MOD 30 MIN: CPT | Mod: S$PBB,,, | Performed by: PHYSICIAN ASSISTANT

## 2019-10-23 PROCEDURE — 99999 PR PBB SHADOW E&M-EST. PATIENT-LVL IV: CPT | Mod: PBBFAC,,, | Performed by: PHYSICIAN ASSISTANT

## 2019-10-23 PROCEDURE — 99214 PR OFFICE/OUTPT VISIT, EST, LEVL IV, 30-39 MIN: ICD-10-PCS | Mod: S$PBB,,, | Performed by: PHYSICIAN ASSISTANT

## 2019-10-23 RX ORDER — ALPRAZOLAM 0.5 MG/1
1 TABLET, ORALLY DISINTEGRATING ORAL ONCE AS NEEDED
Status: CANCELLED | OUTPATIENT
Start: 2019-11-05 | End: 2031-04-02

## 2019-10-23 NOTE — PROGRESS NOTES
This note was completed with dictation software and grammatical errors may exist.    CC: Neck pain, back pain    HPI: The patient is a 64-year-old man with a history of hypertension, chronic neck and back pain who presents in referral from [facility] for continued neck and back pain. He is status post C7-T1 interlaminar epidural steroid injection on 09/25/2019 with 40% relief.  He is very pleased with these results and states this is the best cervical injection he has had in a long time. He continues to have some neck pain and left upper arm pain but this is tolerable currently.  His main complaint is squeezing bilateral low back pain that intermittently radiates to the buttocks bilaterally.  This is worse with sitting and improved with his pain medication.  He denies numbness but reports weakness in his legs.  He denies bladder or bowel incontinence.      Pain intervention history: [facility] gabapentin, Robaxin, capsaicin cream for his neck.  He has had lumbar epidural steroid injections but nothing for his neck.  He is status post C7-T1 cervical interlaminar epidural steroid injection on 8/22/16 with what sounds like moderate relief.  He is status post C7-T1 cervical interlaminar epidural steroid on 7/24/17 with 25% relief.  He is not interested in doing any physical therapy.  He is status post C7-T1 cervical interlaminar epidural steroid injection on 9/12/17 with what sounds like excellent relief.  He is status post L5/S1 interlaminar epidural steroid injection on 10/31/17 with 30% relief.  He is status post L5/S1 interlaminar epidural steroid injection on 1/15/18 with 40% relief.  He is status post C7-T1 cervical interlaminar epidural steroid injection on 02/04/2019 with 40% relief.   He is status post C7-T1 interlaminar epidural steroid injection on 09/25/2019 with 40% relief.     From 1/24/2019 visit:  [facility] who suggested  against cervical or lumbar spine surgery.  The patient also asked him about an opioid pump and spinal cord stimulation. He states the surgeon also recommended against these due to the possibility of developing keloids.     ROS: He reports back pain, neck pain.  Balance of review of systems is negative.    Medical, surgical, family and social history reviewed elsewhere in record.    Medications/Allergies: See med card    Vitals:    10/23/19 1145   BP: 133/74   Pulse: 64   Resp: 20   Temp: 97 °F (36.1 °C)   TempSrc: Oral   SpO2: 99%   Weight: 94.3 kg (207 lb 14.3 oz)   PainSc:   6   PainLoc: Neck         Physical exam:  Gen: A and O x3, pleasant, well-groomed  Skin: No rashes or obvious lesions  HEENT: PERRLA, no obvious deformities on ears or in canals.Trachea midline.  CVS: Regular rate and rhythm, normal palpable pulses.  Resp: Clear to auscultation bilaterally, no wheezes or rales.  Abdomen: Soft, NT/ND.  Musculoskeletal:  Antalgic gait due to left knee pain.  Wearing a knee brace.    Neuro:  Upper extremities: 5/5 strength bilaterally   Lower extremities: 5/5 strength bilaterally  Reflexes: Brachioradialis 2+, Bicep 2+, Tricep 2+.  Patellar 2+, Achilles 2+ bilaterally.  Sensory: Intact and symmetrical to light touch and pinprick in C2-T1 dermatomes bilaterally.  Intact and symmetrical to light touch and pinprick in L2-S1 dermatomes bilaterally.    Cervical Spine:  Cervical spine: ROM is moderately limited with left lateral rotation with increased left neck and left trapezius muscle pain. Range of motion has mildly improved.  Range of motion is mildly limited with flexion, extension right lateral rotation with mild increased left neck pain.  Spurling's maneuver causes no neck pain to either side.  Myofascial exam:  No tenderness to palpation to the cervical paraspinous muscles.    Lumbar spine:  Lumbar spine: ROM is mildly limited with flexion moderately limited with extension and oblique extension with increased  low back pain during each maneuver but especially with extension.  Juma's test causes no increased pain on either side.    Supine straight leg raise causes posterior thigh pain bilaterally.  Internal and external rotation of the hip causes no increased pain on either side.  Myofascial exam: No tenderness to palpation across lumbar paraspinous muscles.      Imaging:  MRI cervical spine 5/16/16: C1/2 normal.  C2/3 mild facet arthropathy.  At C3/4 mild facet arthropathy.  C4/5 mild small broad-based posterior disc bulge and facet arthrosis resulting in ventral CSF space effacement and mild ventral cord flattening.  At C5/6 there is broad-based disc bulge with focal left paracentral disc protrusion bilateral facet arthrosis and left uncovertebral spurring resulting in severe left neural foraminal narrowing, ventral CSF space effacement and mild ventral cord flattening.  At C6/7 there is broad-based posterior disc bulge and osteophytes bilateral uncovertebral spurring and facet arthropathy resulting in partial ventral CSF space effacement, mild ventral cord flattening and severe bilateral foraminal stenosis.  At C7/T1 there is broad-based posterior disc bulge with bilateral facet arthrosis and left uncovertebral spurring resulting in partial CSF space effacement and ventral cord flattening and severe left neural foraminal stenosis.    CT lumbar spine report 7/15/11  L2-3 mild disc bulge  L3-4 mild bulging of the disc with flattening of the ventral thecal sac resulting in mild narrowing of the foramina  L4-5 mild bulging of the disc is present along with ligamentum flavum hypertrophy combine to result in a moderate degree of canal stenosis with mild foraminal narrowing    MRI lumbar spine 8/25/17  At the T12-L1 normal, mild ligamentous hypertrophy is noted, no significant central canal stenosis or neural foraminal stenosis is noted.  At the L1-L2 level, mild ligamentous hypertrophy appears to be present,  no  significant disk bulge, central canal stenosis, or neural foraminal stenosis is noted  At L2-L3 level, broad-based bulge or protrusion appears to be present of 4 mm with asymmetric bulging towards each neural foramen. Mild bilateral neural foraminal stenosis is noted. Mild central canal narrowing is noted to 9 mm. Mild bilateral lateral recess narrowing is noted.  At L3-L4 level, asymmetric bulging is noted towards the left neural foramen slightly greater than right of approximately 3 mm, mild left greater than right neural foraminal narrowing is noted without significant central canal stenosis. Mild facet arthropathy and ligamentous hypertrophy is noted.  At the L4-L5 level, broad-based protrusion appears to be present centrally of 6 mm with increased T2 signal suggestive of an annular tear. Ligamentous hypertrophy and facet arthropathy appears to be present. Moderate central canal stenosis appears to be present with bunching of the nerve roots and central canal narrowing to 7 mm. Bilateral lateral recess stenosis is noted. Mild/moderate bilateral neural foraminal narrowing is noted slightly greater to the right than left.  At the L5-S1 level, broad-based bulge is noted centrally of 3 mm with increased T2 signal this can be seen with an annular tear, mild left greater than right neural foraminal stenosis appears to be present. Mild central canal narrowing is noted.      Assessment:  The patient is a 63-year-old man with a history of hypertension, chronic neck and back pain who presents in referral from [facility] for continued neck and back pain.   1. Lumbar radiculopathy     2. Spinal stenosis of lumbar region with neurogenic claudication     3. Cervical radiculopathy     4. Cervical stenosis of spine     5. Chronic pain of left knee         Plan:  1.  The patient did well following the cervical SOLIS and this can be repeated in the future if necessary.  2.  I will schedule him for an L5/S1  interlaminar epidural steroid injection. He has done well with this in the past.  He is a high risk patient with hypertension.  He takes aspirin and we will have to hold this prior to the injection.  3.  Follow-up in 4 weeks postprocedure sooner as needed.

## 2019-10-23 NOTE — H&P (VIEW-ONLY)
This note was completed with dictation software and grammatical errors may exist.    CC: Neck pain, back pain    HPI: The patient is a 64-year-old man with a history of hypertension, chronic neck and back pain who presents in referral from [facility] for continued neck and back pain. He is status post C7-T1 interlaminar epidural steroid injection on 09/25/2019 with 40% relief.  He is very pleased with these results and states this is the best cervical injection he has had in a long time. He continues to have some neck pain and left upper arm pain but this is tolerable currently.  His main complaint is squeezing bilateral low back pain that intermittently radiates to the buttocks bilaterally.  This is worse with sitting and improved with his pain medication.  He denies numbness but reports weakness in his legs.  He denies bladder or bowel incontinence.      [facility] intervention history: [facility] gabapentin, Robaxin, capsaicin cream for his neck.  He has had lumbar epidural steroid injections but nothing for his neck.  He is status post C7-T1 cervical interlaminar epidural steroid injection on 8/22/16 with what sounds like moderate relief.  He is status post C7-T1 cervical interlaminar epidural steroid on 7/24/17 with 25% relief.  He is not interested in doing any physical therapy.  He is status post C7-T1 cervical interlaminar epidural steroid injection on 9/12/17 with what sounds like excellent relief.  He is status post L5/S1 interlaminar epidural steroid injection on 10/31/17 with 30% relief.  He is status post L5/S1 interlaminar epidural steroid injection on 1/15/18 with 40% relief.  He is status post C7-T1 cervical interlaminar epidural steroid injection on 02/04/2019 with 40% relief.   He is status post C7-T1 interlaminar epidural steroid injection on 09/25/2019 with 40% relief.     From 1/24/2019 visit:  [facility] who [facility]  against cervical or lumbar spine surgery.  The patient also asked him about an opioid pump and spinal cord stimulation. He states the surgeon also recommended against these due to the possibility of developing keloids.     ROS: He reports back pain, neck pain.  Balance of review of systems is negative.    Medical, surgical, family and social history reviewed elsewhere in record.    Medications/Allergies: See med card    Vitals:    10/23/19 1145   BP: 133/74   Pulse: 64   Resp: 20   Temp: 97 °F (36.1 °C)   TempSrc: Oral   SpO2: 99%   Weight: 94.3 kg (207 lb 14.3 oz)   PainSc:   6   PainLoc: Neck         Physical exam:  Gen: A and O x3, pleasant, well-groomed  Skin: No rashes or obvious lesions  HEENT: PERRLA, no obvious deformities on ears or in canals.Trachea midline.  CVS: Regular rate and rhythm, normal palpable pulses.  Resp: Clear to auscultation bilaterally, no wheezes or rales.  Abdomen: Soft, NT/ND.  Musculoskeletal:  Antalgic gait due to left knee pain.  Wearing a knee brace.    Neuro:  Upper extremities: 5/5 strength bilaterally   Lower extremities: 5/5 strength bilaterally  Reflexes: Brachioradialis 2+, Bicep 2+, Tricep 2+.  Patellar 2+, Achilles 2+ bilaterally.  Sensory: Intact and symmetrical to light touch and pinprick in C2-T1 dermatomes bilaterally.  Intact and symmetrical to light touch and pinprick in L2-S1 dermatomes bilaterally.    Cervical Spine:  Cervical spine: ROM is moderately limited with left lateral rotation with increased left neck and left trapezius muscle pain. Range of motion has mildly improved.  Range of motion is mildly limited with flexion, extension right lateral rotation with mild increased left neck pain.  Spurling's maneuver causes no neck pain to either side.  Myofascial exam:  No tenderness to palpation to the cervical paraspinous muscles.    Lumbar spine:  Lumbar spine: ROM is mildly limited with flexion moderately limited with extension and oblique extension with increased  low back pain during each maneuver but especially with extension.  Juma's test causes no increased pain on either side.    Supine straight leg raise causes posterior thigh pain bilaterally.  Internal and external rotation of the hip causes no increased pain on either side.  Myofascial exam: No tenderness to palpation across lumbar paraspinous muscles.      Imaging:  MRI cervical spine 5/16/16: C1/2 normal.  C2/3 mild facet arthropathy.  At C3/4 mild facet arthropathy.  C4/5 mild small broad-based posterior disc bulge and facet arthrosis resulting in ventral CSF space effacement and mild ventral cord flattening.  At C5/6 there is broad-based disc bulge with focal left paracentral disc protrusion bilateral facet arthrosis and left uncovertebral spurring resulting in severe left neural foraminal narrowing, ventral CSF space effacement and mild ventral cord flattening.  At C6/7 there is broad-based posterior disc bulge and osteophytes bilateral uncovertebral spurring and facet arthropathy resulting in partial ventral CSF space effacement, mild ventral cord flattening and severe bilateral foraminal stenosis.  At C7/T1 there is broad-based posterior disc bulge with bilateral facet arthrosis and left uncovertebral spurring resulting in partial CSF space effacement and ventral cord flattening and severe left neural foraminal stenosis.    CT lumbar spine report 7/15/11  L2-3 mild disc bulge  L3-4 mild bulging of the disc with flattening of the ventral thecal sac resulting in mild narrowing of the foramina  L4-5 mild bulging of the disc is present along with ligamentum flavum hypertrophy combine to result in a moderate degree of canal stenosis with mild foraminal narrowing    MRI lumbar spine 8/25/17  At the T12-L1 normal, mild ligamentous hypertrophy is noted, no significant central canal stenosis or neural foraminal stenosis is noted.  At the L1-L2 level, mild ligamentous hypertrophy appears to be present,  no  significant disk bulge, central canal stenosis, or neural foraminal stenosis is noted  At L2-L3 level, broad-based bulge or protrusion appears to be present of 4 mm with asymmetric bulging towards each neural foramen. Mild bilateral neural foraminal stenosis is noted. Mild central canal narrowing is noted to 9 mm. Mild bilateral lateral recess narrowing is noted.  At L3-L4 level, asymmetric bulging is noted towards the left neural foramen slightly greater than right of approximately 3 mm, mild left greater than right neural foraminal narrowing is noted without significant central canal stenosis. Mild facet arthropathy and ligamentous hypertrophy is noted.  At the L4-L5 level, broad-based protrusion appears to be present centrally of 6 mm with increased T2 signal suggestive of an annular tear. Ligamentous hypertrophy and facet arthropathy appears to be present. Moderate central canal stenosis appears to be present with bunching of the nerve roots and central canal narrowing to 7 mm. Bilateral lateral recess stenosis is noted. Mild/moderate bilateral neural foraminal narrowing is noted slightly greater to the right than left.  At the L5-S1 level, broad-based bulge is noted centrally of 3 mm with increased T2 signal this can be seen with an annular tear, mild left greater than right neural foraminal stenosis appears to be present. Mild central canal narrowing is noted.      Assessment:  The patient is a 63-year-old man with a history of hypertension, chronic neck and back pain who presents in referral from [facility] for continued neck and back pain.   1. Lumbar radiculopathy     2. Spinal stenosis of lumbar region with neurogenic claudication     3. Cervical radiculopathy     4. Cervical stenosis of spine     5. Chronic pain of left knee         Plan:  1.  The patient did well following the cervical SOLIS and this can be repeated in the future if necessary.  2.  I will schedule him for an L5/S1  interlaminar epidural steroid injection. He has done well with this in the past.  He is a high risk patient with hypertension.  He takes aspirin and we will have to hold this prior to the injection.  3.  Follow-up in 4 weeks postprocedure sooner as needed.

## 2019-11-04 DIAGNOSIS — M51.36 DDD (DEGENERATIVE DISC DISEASE), LUMBAR: Primary | ICD-10-CM

## 2019-11-05 ENCOUNTER — HOSPITAL ENCOUNTER (OUTPATIENT)
Facility: HOSPITAL | Age: 64
Discharge: HOME | End: 2019-11-05
Attending: ANESTHESIOLOGY | Admitting: ANESTHESIOLOGY
Payer: MEDICARE

## 2019-11-05 ENCOUNTER — HOSPITAL ENCOUNTER (OUTPATIENT)
Dept: RADIOLOGY | Facility: HOSPITAL | Age: 64
Discharge: HOME | End: 2019-11-05
Attending: ANESTHESIOLOGY | Admitting: ANESTHESIOLOGY
Payer: MEDICARE

## 2019-11-05 VITALS
TEMPERATURE: 97 F | WEIGHT: 195 LBS | DIASTOLIC BLOOD PRESSURE: 89 MMHG | HEIGHT: 72 IN | OXYGEN SATURATION: 100 % | HEART RATE: 55 BPM | SYSTOLIC BLOOD PRESSURE: 148 MMHG | RESPIRATION RATE: 16 BRPM | BODY MASS INDEX: 26.41 KG/M2

## 2019-11-05 DIAGNOSIS — M51.36 DDD (DEGENERATIVE DISC DISEASE), LUMBAR: ICD-10-CM

## 2019-11-05 DIAGNOSIS — M54.16 LUMBAR RADICULOPATHY: Primary | ICD-10-CM

## 2019-11-05 PROCEDURE — 63600175 PHARM REV CODE 636 W HCPCS: Mod: PO | Performed by: ANESTHESIOLOGY

## 2019-11-05 PROCEDURE — 62323 NJX INTERLAMINAR LMBR/SAC: CPT | Mod: ,,, | Performed by: ANESTHESIOLOGY

## 2019-11-05 PROCEDURE — 62323 NJX INTERLAMINAR LMBR/SAC: CPT | Mod: PO | Performed by: ANESTHESIOLOGY

## 2019-11-05 PROCEDURE — 76000 FLUOROSCOPY <1 HR PHYS/QHP: CPT | Mod: TC,PO

## 2019-11-05 PROCEDURE — 62323 PR INJ LUMBAR/SACRAL, W/IMAGING GUIDANCE: ICD-10-PCS | Mod: ,,, | Performed by: ANESTHESIOLOGY

## 2019-11-05 PROCEDURE — 25000003 PHARM REV CODE 250: Mod: PO | Performed by: ANESTHESIOLOGY

## 2019-11-05 PROCEDURE — 25500020 PHARM REV CODE 255: Mod: PO | Performed by: ANESTHESIOLOGY

## 2019-11-05 RX ORDER — METHYLPREDNISOLONE ACETATE 80 MG/ML
INJECTION, SUSPENSION INTRA-ARTICULAR; INTRALESIONAL; INTRAMUSCULAR; SOFT TISSUE
Status: DISCONTINUED | OUTPATIENT
Start: 2019-11-05 | End: 2019-11-05 | Stop reason: HOSPADM

## 2019-11-05 RX ORDER — ALPRAZOLAM 0.5 MG/1
1 TABLET, ORALLY DISINTEGRATING ORAL ONCE AS NEEDED
Status: COMPLETED | OUTPATIENT
Start: 2019-11-05 | End: 2019-11-05

## 2019-11-05 RX ORDER — LIDOCAINE HYDROCHLORIDE 10 MG/ML
INJECTION, SOLUTION EPIDURAL; INFILTRATION; INTRACAUDAL; PERINEURAL
Status: DISCONTINUED | OUTPATIENT
Start: 2019-11-05 | End: 2019-11-05 | Stop reason: HOSPADM

## 2019-11-05 RX ADMIN — ALPRAZOLAM 1 MG: 0.5 TABLET, ORALLY DISINTEGRATING ORAL at 02:11

## 2019-11-05 NOTE — DISCHARGE SUMMARY
[facility]  Discharge Note  Short Stay    Admit Date: 11/5/2019    Discharge Date: 11/5/2019    Attending Physician: Phil Ann MD     Discharge Provider: Phil Ann    Diagnoses:  Active Hospital Problems    Diagnosis  POA    *Lumbar radiculopathy [M54.16]  Yes      Resolved Hospital Problems   No resolved problems to display.       Discharged Condition: good    Final Diagnoses: Lumbar radiculopathy [M54.16]    Disposition: Home or Self Care    Hospital Course: no complications, uneventful    Outcome of Hospitalization, Treatment, Procedure, or Surgery:  Patient was admitted for outpatient procedure. The patient underwent procedure without complications and are discharged home    Follow up/Patient Instructions:  Follow up as scheduled in Pain Management clinic in 3-4 weeks/Patient has received instructions and follow up date and time    Medications:  Continue previous medications    Discharge Procedure Orders   Call MD for:  temperature >100.4     Call MD for:  severe uncontrolled pain     Call MD for:  redness, tenderness, or signs of infection (pain, swelling, redness, odor or green/yellow discharge around incision site)     Call MD for:  severe persistent headache     No dressing needed         Discharge Procedure Orders (must include Diet, Follow-up, Activity):   Discharge Procedure Orders (must include Diet, Follow-up, Activity)   Call MD for:  temperature >100.4     Call MD for:  severe uncontrolled pain     Call MD for:  redness, tenderness, or signs of infection (pain, swelling, redness, odor or green/yellow discharge around incision site)     Call MD for:  severe persistent headache     No dressing needed

## 2019-11-05 NOTE — OP NOTE

## 2019-11-05 NOTE — DISCHARGE INSTRUCTIONS
PAIN MANAGEMENT    Home care instructions   Apply ice pack to the injection site for 20 minute prior for the first 24 hours for soreness/discomfort at injection site   DO NOT USE HEAT FOR 24 HOURS   Keep site clean and dry for 24 hours, remove bandaid when desired   Do not drive until tomorrow  Take care when walking after a lumbar injection     STEROIDS OR RADIOFREQUENCY    May take 10-14 days for full effects  Avoid strenuous exercises for 2 days          Resume Aspirin, Plavix, or Coumadin the day after the procedure unless other wise instructed  Resume home medication as prescribed today      CALL PHYSICIAN FOR:   Severe increase in your usual pain or appearance of new pain   Prolonged or increasing weakness or numbness in the legs or arms   Fever greater then 100 degrees F..   Drainage from the incision site, redness, active bleeding or increased swelling at the injection site   Headache that increases when your head is upright and decreases when you lie flat    FOR EMERGENCIES:   Go directly to Emergency Department for Shortness of breath, chest pain, or problems breathing

## 2019-12-03 ENCOUNTER — OFFICE VISIT (OUTPATIENT)
Dept: PAIN MEDICINE | Facility: CLINIC | Age: 64
End: 2019-12-03
Payer: MEDICARE

## 2019-12-03 VITALS
HEART RATE: 60 BPM | WEIGHT: 205.5 LBS | TEMPERATURE: 98 F | SYSTOLIC BLOOD PRESSURE: 126 MMHG | DIASTOLIC BLOOD PRESSURE: 71 MMHG | BODY MASS INDEX: 27.87 KG/M2 | RESPIRATION RATE: 18 BRPM

## 2019-12-03 DIAGNOSIS — M54.12 CERVICAL RADICULOPATHY: Primary | ICD-10-CM

## 2019-12-03 DIAGNOSIS — M48.062 SPINAL STENOSIS OF LUMBAR REGION WITH NEUROGENIC CLAUDICATION: ICD-10-CM

## 2019-12-03 DIAGNOSIS — M54.16 LUMBAR RADICULOPATHY: ICD-10-CM

## 2019-12-03 DIAGNOSIS — M51.36 DDD (DEGENERATIVE DISC DISEASE), LUMBAR: ICD-10-CM

## 2019-12-03 DIAGNOSIS — M48.02 CERVICAL STENOSIS OF SPINE: ICD-10-CM

## 2019-12-03 PROCEDURE — 1159F MED LIST DOCD IN RCRD: CPT | Mod: ,,, | Performed by: PHYSICIAN ASSISTANT

## 2019-12-03 PROCEDURE — 1159F PR MEDICATION LIST DOCUMENTED IN MEDICAL RECORD: ICD-10-PCS | Mod: ,,, | Performed by: PHYSICIAN ASSISTANT

## 2019-12-03 PROCEDURE — 99999 PR PBB SHADOW E&M-EST. PATIENT-LVL IV: CPT | Mod: PBBFAC,,, | Performed by: PHYSICIAN ASSISTANT

## 2019-12-03 PROCEDURE — 99214 OFFICE O/P EST MOD 30 MIN: CPT | Mod: S$PBB,,, | Performed by: PHYSICIAN ASSISTANT

## 2019-12-03 PROCEDURE — 99214 PR OFFICE/OUTPT VISIT, EST, LEVL IV, 30-39 MIN: ICD-10-PCS | Mod: S$PBB,,, | Performed by: PHYSICIAN ASSISTANT

## 2019-12-03 PROCEDURE — 99999 PR PBB SHADOW E&M-EST. PATIENT-LVL IV: ICD-10-PCS | Mod: PBBFAC,,, | Performed by: PHYSICIAN ASSISTANT

## 2019-12-03 PROCEDURE — 1125F PR PAIN SEVERITY QUANTIFIED, PAIN PRESENT: ICD-10-PCS | Mod: ,,, | Performed by: PHYSICIAN ASSISTANT

## 2019-12-03 PROCEDURE — 99214 OFFICE O/P EST MOD 30 MIN: CPT | Mod: PBBFAC,PN | Performed by: PHYSICIAN ASSISTANT

## 2019-12-03 PROCEDURE — 1125F AMNT PAIN NOTED PAIN PRSNT: CPT | Mod: ,,, | Performed by: PHYSICIAN ASSISTANT

## 2019-12-03 RX ORDER — SODIUM CHLORIDE, SODIUM LACTATE, POTASSIUM CHLORIDE, CALCIUM CHLORIDE 600; 310; 30; 20 MG/100ML; MG/100ML; MG/100ML; MG/100ML
INJECTION, SOLUTION INTRAVENOUS CONTINUOUS
Status: CANCELLED | OUTPATIENT
Start: 2020-02-03

## 2019-12-09 NOTE — PROGRESS NOTES
This note was completed with dictation software and grammatical errors may exist.    CC: Neck pain, back pain    HPI: The patient is a 64-year-old man with a history of hypertension, chronic neck and back pain who presents in referral from [facility] for continued neck and back pain. He is status post L5/S1 interlaminar epidural steroid injection on 11/05/2019 with 40-50% relief.  He is pleased with the results.  He continues to have some back pain with intermittent radiation to the buttocks but his biggest complaint today is neck pain radiating into the left upper arm.  This is worse with turning his head.  He complains of weakness in his left arm and his legs.  He denies numbness.  He denies bladder or bowel incontinence.    Pain intervention history: [facility] tried gabapentin, Robaxin, capsaicin cream for his neck.  He has had lumbar epidural steroid injections but nothing for his neck.  He is status post C7-T1 cervical interlaminar epidural steroid injection on 8/22/16 with what sounds like moderate relief.  He is status post C7-T1 cervical interlaminar epidural steroid on 7/24/17 with 25% relief.  He is not interested in doing any physical therapy.  He is status post C7-T1 cervical interlaminar epidural steroid injection on 9/12/17 with what sounds like excellent relief.  He is status post L5/S1 interlaminar epidural steroid injection on 10/31/17 with 30% relief.  He is status post L5/S1 interlaminar epidural steroid injection on 1/15/18 with 40% relief.  He is status post C7-T1 cervical interlaminar epidural steroid injection on 02/04/2019 with 40% relief.   He is status post C7-T1 interlaminar epidural steroid injection on 09/25/2019 with 40% relief.  He is status post L5/S1 interlaminar epidural steroid injection on 11/05/2019 with 40-50% relief.    From 1/24/2019 visit:  [facility] or lumbar spine  surgery.  The patient also asked him about an opioid pump and spinal cord stimulation. He states the surgeon also recommended against these due to the possibility of developing keloids.     ROS: He reports back pain, neck pain.  Balance of review of systems is negative.    Medical, surgical, family and social history reviewed elsewhere in record.    Medications/Allergies: See med card    Vitals:    12/03/19 1122   BP: 126/71   Pulse: 60   Resp: 18   Temp: 97.8 °F (36.6 °C)   TempSrc: Oral   Weight: 93.2 kg (205 lb 7.5 oz)   PainSc:   4   PainLoc: Back         Physical exam:  Gen: A and O x3, pleasant, well-groomed  Skin: No rashes or obvious lesions  HEENT: PERRLA, no obvious deformities on ears or in canals.Trachea midline.  CVS: Regular rate and rhythm, normal palpable pulses.  Resp: Clear to auscultation bilaterally, no wheezes or rales.  Abdomen: Soft, NT/ND.  Musculoskeletal:  Antalgic gait due to left knee pain.  Wearing a knee brace.    Neuro:  Upper extremities: 5/5 strength bilaterally   Lower extremities: 5/5 strength bilaterally  Reflexes: Brachioradialis 2+, Bicep 2+, Tricep 2+.  Patellar 2+, Achilles 2+ bilaterally.  Sensory: Intact and symmetrical to light touch and pinprick in C2-T1 dermatomes bilaterally.  Intact and symmetrical to light touch and pinprick in L2-S1 dermatomes bilaterally.    Cervical Spine:  Cervical spine: ROM is moderately limited with left lateral rotation with increased left neck and left trapezius muscle pain. Range of motion has mildly improved.  Range of motion is mildly limited with flexion, extension right lateral rotation with mild increased left neck pain.  Spurling's maneuver causes no neck pain to either side.  Myofascial exam:  No tenderness to palpation to the cervical paraspinous muscles.    Lumbar spine:  Lumbar spine: ROM is mildly limited with flexion moderately limited with extension and oblique extension with increased low back pain during each maneuver but  especially with extension.  Juma's test causes no increased pain on either side.    Supine straight leg raise causes posterior thigh pain bilaterally.  Internal and external rotation of the hip causes no increased pain on either side.  Myofascial exam: No tenderness to palpation across lumbar paraspinous muscles.      Imaging:  MRI cervical spine 5/16/16: C1/2 normal.  C2/3 mild facet arthropathy.  At C3/4 mild facet arthropathy.  C4/5 mild small broad-based posterior disc bulge and facet arthrosis resulting in ventral CSF space effacement and mild ventral cord flattening.  At C5/6 there is broad-based disc bulge with focal left paracentral disc protrusion bilateral facet arthrosis and left uncovertebral spurring resulting in severe left neural foraminal narrowing, ventral CSF space effacement and mild ventral cord flattening.  At C6/7 there is broad-based posterior disc bulge and osteophytes bilateral uncovertebral spurring and facet arthropathy resulting in partial ventral CSF space effacement, mild ventral cord flattening and severe bilateral foraminal stenosis.  At C7/T1 there is broad-based posterior disc bulge with bilateral facet arthrosis and left uncovertebral spurring resulting in partial CSF space effacement and ventral cord flattening and severe left neural foraminal stenosis.    CT lumbar spine report 7/15/11  L2-3 mild disc bulge  L3-4 mild bulging of the disc with flattening of the ventral thecal sac resulting in mild narrowing of the foramina  L4-5 mild bulging of the disc is present along with ligamentum flavum hypertrophy combine to result in a moderate degree of canal stenosis with mild foraminal narrowing    MRI lumbar spine 8/25/17  At the T12-L1 normal, mild ligamentous hypertrophy is noted, no significant central canal stenosis or neural foraminal stenosis is noted.  At the L1-L2 level, mild ligamentous hypertrophy appears to be present,  no significant disk bulge, central canal stenosis,  or neural foraminal stenosis is noted  At L2-L3 level, broad-based bulge or protrusion appears to be present of 4 mm with asymmetric bulging towards each neural foramen. Mild bilateral neural foraminal stenosis is noted. Mild central canal narrowing is noted to 9 mm. Mild bilateral lateral recess narrowing is noted.  At L3-L4 level, asymmetric bulging is noted towards the left neural foramen slightly greater than right of approximately 3 mm, mild left greater than right neural foraminal narrowing is noted without significant central canal stenosis. Mild facet arthropathy and ligamentous hypertrophy is noted.  At the L4-L5 level, broad-based protrusion appears to be present centrally of 6 mm with increased T2 signal suggestive of an annular tear. Ligamentous hypertrophy and facet arthropathy appears to be present. Moderate central canal stenosis appears to be present with bunching of the nerve roots and central canal narrowing to 7 mm. Bilateral lateral recess stenosis is noted. Mild/moderate bilateral neural foraminal narrowing is noted slightly greater to the right than left.  At the L5-S1 level, broad-based bulge is noted centrally of 3 mm with increased T2 signal this can be seen with an annular tear, mild left greater than right neural foraminal stenosis appears to be present. Mild central canal narrowing is noted.      Assessment:  The patient is a 64-year-old man with a history of hypertension, chronic neck and back pain who presents in referral from [facility] for continued neck and back pain.   1. Cervical radiculopathy     2. Lumbar radiculopathy     3. Spinal stenosis of lumbar region with neurogenic claudication     4. Cervical stenosis of spine     5. DDD (degenerative disc disease), lumbar         Plan:  1.  The patient did well following the lumbar SOLIS.  This can be repeated in the future if necessary.  2.  We discussed repeating a C7-T1 interlaminar epidural steroid injection and he  would like to do this in February.  He is a high risk patient with aspirin.  We will have to obtain approval to hold aspirin prior to his procedure. We will get this set up for him and he will follow-up 4 weeks after his injection or sooner as needed.

## 2020-01-09 ENCOUNTER — OFFICE VISIT (OUTPATIENT)
Dept: FAMILY MEDICINE | Facility: CLINIC | Age: 65
End: 2020-01-09
Payer: MEDICARE

## 2020-01-09 VITALS
SYSTOLIC BLOOD PRESSURE: 135 MMHG | DIASTOLIC BLOOD PRESSURE: 79 MMHG | BODY MASS INDEX: 28.31 KG/M2 | HEIGHT: 72 IN | HEART RATE: 66 BPM | TEMPERATURE: 98 F | WEIGHT: 209 LBS

## 2020-01-09 DIAGNOSIS — M54.12 CERVICAL RADICULOPATHY: ICD-10-CM

## 2020-01-09 DIAGNOSIS — M54.16 LUMBAR RADICULOPATHY: ICD-10-CM

## 2020-01-09 DIAGNOSIS — M51.36 DDD (DEGENERATIVE DISC DISEASE), LUMBAR: ICD-10-CM

## 2020-01-09 DIAGNOSIS — E78.49 OTHER HYPERLIPIDEMIA: ICD-10-CM

## 2020-01-09 DIAGNOSIS — M48.02 CERVICAL STENOSIS OF SPINE: Primary | ICD-10-CM

## 2020-01-09 DIAGNOSIS — I10 BENIGN ESSENTIAL HTN: ICD-10-CM

## 2020-01-09 PROCEDURE — 1159F MED LIST DOCD IN RCRD: CPT | Mod: ,,, | Performed by: FAMILY MEDICINE

## 2020-01-09 PROCEDURE — 99214 OFFICE O/P EST MOD 30 MIN: CPT | Mod: S$PBB,,, | Performed by: FAMILY MEDICINE

## 2020-01-09 PROCEDURE — 99999 PR PBB SHADOW E&M-EST. PATIENT-LVL III: ICD-10-PCS | Mod: PBBFAC,,, | Performed by: FAMILY MEDICINE

## 2020-01-09 PROCEDURE — 99214 PR OFFICE/OUTPT VISIT, EST, LEVL IV, 30-39 MIN: ICD-10-PCS | Mod: S$PBB,,, | Performed by: FAMILY MEDICINE

## 2020-01-09 PROCEDURE — 1125F PR PAIN SEVERITY QUANTIFIED, PAIN PRESENT: ICD-10-PCS | Mod: ,,, | Performed by: FAMILY MEDICINE

## 2020-01-09 PROCEDURE — 1159F PR MEDICATION LIST DOCUMENTED IN MEDICAL RECORD: ICD-10-PCS | Mod: ,,, | Performed by: FAMILY MEDICINE

## 2020-01-09 PROCEDURE — 99213 OFFICE O/P EST LOW 20 MIN: CPT | Mod: PBBFAC,PO | Performed by: FAMILY MEDICINE

## 2020-01-09 PROCEDURE — 99999 PR PBB SHADOW E&M-EST. PATIENT-LVL III: CPT | Mod: PBBFAC,,, | Performed by: FAMILY MEDICINE

## 2020-01-09 PROCEDURE — 1125F AMNT PAIN NOTED PAIN PRSNT: CPT | Mod: ,,, | Performed by: FAMILY MEDICINE

## 2020-01-09 RX ORDER — HYDROCODONE BITARTRATE AND ACETAMINOPHEN 10; 325 MG/1; MG/1
1 TABLET ORAL 4 TIMES DAILY
Qty: 360 TABLET | Refills: 0 | Status: SHIPPED | OUTPATIENT
Start: 2020-01-09 | End: 2020-04-08 | Stop reason: SDUPTHER

## 2020-01-09 NOTE — PROGRESS NOTES
The patient presents today fu chronic pain management generally tolerating w HC 10 qid    Hx cervical stenosis and lumbar disc disease w chonic pain and radiculopathy. He has DJD todd bilateral knees.  HBP and HLP controlled     Past Medical History:  Past Medical History:   Diagnosis Date    Anticoagulant long-term use     aspirn    Asthma     Cervical stenosis of spine     DDD (degenerative disc disease), lumbar     Depression     DJD (degenerative joint disease)     Hyperlipidemia     Hypertension     Intervertebral disc protrusion     Seasonal allergies     Skin abnormalities      Past Surgical History:   Procedure Laterality Date    cervical injection      COLONOSCOPY      EPIDURAL STEROID INJECTION INTO CERVICAL SPINE N/A 2/4/2019    Procedure: Injection-steroid-epidural-cervical;  Surgeon: Phil Ann MD;  Location: [facility] OR;  Service: Pain Management;  Laterality: N/A;  to the LEFT    EPIDURAL STEROID INJECTION INTO CERVICAL SPINE N/A 9/25/2019    Procedure: Injection-steroid-epidural-cervical;  Surgeon: Phil Ann MD;  Location: [facility] OR;  Service: Pain Management;  Laterality: N/A;    EPIDURAL STEROID INJECTION INTO LUMBAR SPINE N/A 11/5/2019    Procedure: Injection-steroid-epidural- lumbar L5/S1;  Surgeon: Phil Ann MD;  Location: [facility] OR;  Service: Pain Management;  Laterality: N/A;    EYE SURGERY      MULTIPLE TOOTH EXTRACTIONS       Review of patient's allergies indicates:   Allergen Reactions    Ace inhibitors Swelling     Current Outpatient Medications on File Prior to Visit   Medication Sig Dispense Refill    albuterol 90 mcg/actuation inhaler Inhale 2 puffs into the lungs every 6 (six) hours as needed for Wheezing. 54 g 4    amlodipine (NORVASC) 10 MG tablet Take 1 tablet (10 mg total) by mouth once daily. 90 tablet 3    aspirin 81 MG Chew Take 1 tablet (81 mg total) by mouth once daily. 90 tablet 3    atenolol (TENORMIN) 25 MG tablet Take 0.5 tablets  (12.5 mg total) by mouth once daily. 90 tablet 3    diltiaZEM (DILACOR XR) 180 MG CDCR Take 1 capsule (180 mg total) by mouth 2 (two) times daily. 180 capsule 3    gabapentin (NEURONTIN) 300 MG capsule Take 2 capsules (600 mg total) by mouth 3 (three) times daily. 540 capsule 3    hydrochlorothiazide (HYDRODIURIL) 12.5 MG Tab Take 1 tablet (12.5 mg total) by mouth once daily. 90 tablet 3    HYDROcodone-acetaminophen (NORCO)  mg per tablet Take 1 tablet by mouth 4 (four) times daily. 360 tablet 0    ibuprofen (ADVIL,MOTRIN) 800 MG tablet Take 1 tablet (800 mg total) by mouth 4 (four) times daily. 360 tablet 3    loratadine (CLARITIN) 10 mg tablet Take 1 tablet (10 mg total) by mouth once daily. 90 tablet 3    LUBRICANT EYE, POLYV ALCOHOL, 1.4 % ophthalmic solution       meloxicam (MOBIC) 15 MG tablet Take 0.5 tablets (7.5 mg total) by mouth once daily. 90 tablet 3    mupirocin (BACTROBAN) 2 % ointment Apply topically 3 (three) times daily. 1 Tube 0    potassium chloride (KLOR-CON) 10 MEQ TbSR Take 2 tablets (20 mEq total) by mouth 2 (two) times daily. 360 tablet 3    simvastatin (ZOCOR) 20 MG tablet Take 1 tablet (20 mg total) by mouth every evening. 90 tablet 3    SYMBICORT 80-4.5 mcg/actuation HFAA 2 puffs daily as needed.       travoprost, benzalkonium, (TRAVATAN) 0.004 % ophthalmic solution Place 1 drop into both eyes nightly. 5 mL 3     No current facility-administered medications on file prior to visit.      Social History     Socioeconomic History    Marital status:      Spouse name: Not on file    Number of children: 3    Years of education: Not on file    Highest education level: Not on file   Occupational History    Not on file   Social Needs    Financial resource strain: Not on file    Food insecurity:     Worry: Not on file     Inability: Not on file    Transportation needs:     Medical: Not on file     Non-medical: Not on file   Tobacco Use    Smoking status: Current  Some Day Smoker     Packs/day: 0.25     Last attempt to quit: 2015     Years since quittin.6    Smokeless tobacco: Never Used    Tobacco comment: smokes once per month   Substance and Sexual Activity    Alcohol use: No    Drug use: No    Sexual activity: Not on file   Lifestyle    Physical activity:     Days per week: Not on file     Minutes per session: Not on file    Stress: Not on file   Relationships    Social connections:     Talks on phone: Not on file     Gets together: Not on file     Attends Worship service: Not on file     Active member of club or organization: Not on file     Attends meetings of clubs or organizations: Not on file     Relationship status: Not on file   Other Topics Concern    Not on file   Social History Narrative    Not on file     No family history on file.      ROS:GENERAL: No fever, chills, fatigability or weight loss.  SKIN: No rashes, itching or changes in color or texture of skin.  HEAD: No headaches or recent head trauma.EYES: Visual acuity fine. No photophobia, ocular pain or diplopia.EARS: Denies ear pain, discharge or vertigo.NOSE: No loss of smell, no epistaxis or postnasal drip.MOUTH & THROAT: No hoarseness or change in voice. No excessive gum bleeding.NODES: Denies swollen glands.  CHEST: Denies CHESTER, cyanosis, wheezing, cough and sputum production.  CARDIOVASCULAR: Denies chest pain, PND, orthopnea or reduced exercise tolerance.  ABDOMEN: Appetite fine. No weight loss. Denies diarrhea, abdominal pain, hematemesis or blood in stool.  URINARY: No flank pain, dysuria or hematuria.  PERIPHERAL VASCULAR: No claudication or cyanosis.  MUSCULOSKELETAL: See above.  NEUROLOGIC: No history of seizures, paralysis, alteration of gait or coordination.  PE:   HEAD: Normocephalic, atraumatic.EYES: PERRL. EOMI.   EARS: TM's intact. Light reflex normal. No retraction or perforation.   NOSE: Mucosa pink. Airway clear.MOUTH & THROAT: No tonsillar enlargement. No pharyngeal  erythema or exudate. No stridor.  NODES: No cervical, axillary or inguinal lymph node enlargement.  CHEST: Lungs clear to auscultation except scattered ronchi   CARDIOVASCULAR: Normal S1, S2. No rubs, murmurs or gallops.  ABDOMEN: Bowel sounds normal. Not distended. Soft. No tenderness or masses.  MUSCULOSKELETAL: Tender bilateral paracervical paralumbar neg SLR, tender knees and shoulders l  NEUROLOGIC: Cranial Nerves: II-XII grossly intact.  Motor: 5/5 strength major flexors/extensors.  DTR's: Knees, Ankles 2+ and equal bilaterally; downgoing toes.  Sensory: Decr sensations hands  Gait & Posture: Antalgic gait     Impression: Cervical stenosis  Shoulder arthropathy  C/L Radiculopathy  Lt meniscal tear   HBP  HLP     Plan: Rev med recs   Cont current BP tx   Rev pain management    Reviewed meds ,pain management ,risks benefits meds   Rev  no concerns

## 2020-02-03 ENCOUNTER — HOSPITAL ENCOUNTER (OUTPATIENT)
Dept: RADIOLOGY | Facility: HOSPITAL | Age: 65
Discharge: HOME | End: 2020-02-03
Attending: ANESTHESIOLOGY
Payer: MEDICARE

## 2020-02-03 ENCOUNTER — TELEPHONE (OUTPATIENT)
Dept: SURGERY | Facility: HOSPITAL | Age: 65
End: 2020-02-03

## 2020-02-03 ENCOUNTER — TELEPHONE (OUTPATIENT)
Dept: PAIN MEDICINE | Facility: CLINIC | Age: 65
End: 2020-02-03

## 2020-02-03 DIAGNOSIS — M54.12 CERVICAL RADICULOPATHY: ICD-10-CM

## 2020-02-03 NOTE — TELEPHONE ENCOUNTER
----- Message from Erma Bowen sent at 2/3/2020  8:40 AM CST -----  Contact: Mr Sellers  Good morning,     Mr. Sellers called the surgery center this morning to cancel his procedure due to his ride canceling on him at the last minute. He ask that you please call him to reschedule his procedure. The best number to reach him at is [phone]. I already informed the nurses in Pre-op that he canceled.

## 2020-02-03 NOTE — TELEPHONE ENCOUNTER
----- Message from Lila Lawrence sent at 2/3/2020  8:33 AM CST -----  Contact: self [phone]  Please call him to reschedule his procedure. He did not have a ride.  Thank you!

## 2020-02-03 NOTE — TELEPHONE ENCOUNTER
Received a message from the OR scheduling LPN that patient called today to cancel procedure due to lack of ride to and from facility today. Please contact patient to reschedule procedure as indicated.

## 2020-02-07 ENCOUNTER — HOSPITAL ENCOUNTER (OUTPATIENT)
Facility: HOSPITAL | Age: 65
Discharge: HOME | End: 2020-02-07
Attending: ANESTHESIOLOGY | Admitting: ANESTHESIOLOGY
Payer: MEDICARE

## 2020-02-07 ENCOUNTER — HOSPITAL ENCOUNTER (OUTPATIENT)
Dept: RADIOLOGY | Facility: HOSPITAL | Age: 65
Discharge: HOME | End: 2020-02-07
Attending: ANESTHESIOLOGY | Admitting: ANESTHESIOLOGY
Payer: MEDICARE

## 2020-02-07 DIAGNOSIS — M54.12 CERVICAL RADICULOPATHY: Primary | ICD-10-CM

## 2020-02-07 DIAGNOSIS — M48.02 CERVICAL STENOSIS OF SPINE: ICD-10-CM

## 2020-02-07 PROCEDURE — 25000003 PHARM REV CODE 250: Mod: PO | Performed by: ANESTHESIOLOGY

## 2020-02-07 PROCEDURE — 63600175 PHARM REV CODE 636 W HCPCS: Mod: PO | Performed by: ANESTHESIOLOGY

## 2020-02-07 PROCEDURE — 62321 NJX INTERLAMINAR CRV/THRC: CPT | Mod: PO | Performed by: ANESTHESIOLOGY

## 2020-02-07 PROCEDURE — 25500020 PHARM REV CODE 255: Mod: PO | Performed by: ANESTHESIOLOGY

## 2020-02-07 PROCEDURE — 62321 PR INJ CERV/THORAC, W/GUIDANCE: ICD-10-PCS | Mod: ,,, | Performed by: ANESTHESIOLOGY

## 2020-02-07 PROCEDURE — 99152 MOD SED SAME PHYS/QHP 5/>YRS: CPT | Mod: ,,, | Performed by: ANESTHESIOLOGY

## 2020-02-07 PROCEDURE — 76000 FLUOROSCOPY <1 HR PHYS/QHP: CPT | Mod: TC,PO

## 2020-02-07 PROCEDURE — 62321 NJX INTERLAMINAR CRV/THRC: CPT | Mod: ,,, | Performed by: ANESTHESIOLOGY

## 2020-02-07 PROCEDURE — A4216 STERILE WATER/SALINE, 10 ML: HCPCS | Mod: PO | Performed by: ANESTHESIOLOGY

## 2020-02-07 PROCEDURE — 99152 PR MOD CONSCIOUS SEDATION, SAME PHYS, 5+ YRS, FIRST 15 MIN: ICD-10-PCS | Mod: ,,, | Performed by: ANESTHESIOLOGY

## 2020-02-07 RX ORDER — LIDOCAINE HYDROCHLORIDE 10 MG/ML
1 INJECTION INFILTRATION; PERINEURAL ONCE
Status: COMPLETED | OUTPATIENT
Start: 2020-02-07 | End: 2020-02-07

## 2020-02-07 RX ORDER — METHYLPREDNISOLONE ACETATE 80 MG/ML
INJECTION, SUSPENSION INTRA-ARTICULAR; INTRALESIONAL; INTRAMUSCULAR; SOFT TISSUE
Status: DISCONTINUED | OUTPATIENT
Start: 2020-02-07 | End: 2020-02-07 | Stop reason: HOSPADM

## 2020-02-07 RX ORDER — SODIUM CHLORIDE, SODIUM LACTATE, POTASSIUM CHLORIDE, CALCIUM CHLORIDE 600; 310; 30; 20 MG/100ML; MG/100ML; MG/100ML; MG/100ML
INJECTION, SOLUTION INTRAVENOUS CONTINUOUS
Status: DISCONTINUED | OUTPATIENT
Start: 2020-02-07 | End: 2020-02-07 | Stop reason: HOSPADM

## 2020-02-07 RX ORDER — SODIUM CHLORIDE 9 MG/ML
INJECTION, SOLUTION INTRAMUSCULAR; INTRAVENOUS; SUBCUTANEOUS
Status: DISCONTINUED | OUTPATIENT
Start: 2020-02-07 | End: 2020-02-07 | Stop reason: HOSPADM

## 2020-02-07 RX ORDER — MIDAZOLAM HYDROCHLORIDE 2 MG/2ML
INJECTION, SOLUTION INTRAMUSCULAR; INTRAVENOUS
Status: DISCONTINUED | OUTPATIENT
Start: 2020-02-07 | End: 2020-02-07 | Stop reason: HOSPADM

## 2020-02-07 RX ORDER — LIDOCAINE HYDROCHLORIDE 10 MG/ML
INJECTION, SOLUTION EPIDURAL; INFILTRATION; INTRACAUDAL; PERINEURAL
Status: DISCONTINUED | OUTPATIENT
Start: 2020-02-07 | End: 2020-02-07 | Stop reason: HOSPADM

## 2020-02-07 RX ADMIN — SODIUM CHLORIDE, SODIUM LACTATE, POTASSIUM CHLORIDE, AND CALCIUM CHLORIDE: .6; .31; .03; .02 INJECTION, SOLUTION INTRAVENOUS at 01:02

## 2020-02-07 RX ADMIN — LIDOCAINE HYDROCHLORIDE: 10 INJECTION, SOLUTION EPIDURAL; INFILTRATION; INTRACAUDAL; PERINEURAL at 01:02

## 2020-02-07 NOTE — H&P
CC: Neck pain    HPI: The patient is a 65yo man with a history of cervical radiculopathy here for cervical SOLIS. There are no major changes in history and physical from 12/3/19.    Past Medical History:   Diagnosis Date    Anticoagulant long-term use     aspirn    Asthma     Cervical stenosis of spine     DDD (degenerative disc disease), lumbar     Depression     DJD (degenerative joint disease)     Hyperlipidemia     Hypertension     Intervertebral disc protrusion     Seasonal allergies     Skin abnormalities        Past Surgical History:   Procedure Laterality Date    cervical injection      COLONOSCOPY      EPIDURAL STEROID INJECTION INTO CERVICAL SPINE N/A 2/4/2019    Procedure: Injection-steroid-epidural-cervical;  Surgeon: Phil Ann MD;  Location: [facility] OR;  Service: Pain Management;  Laterality: N/A;  to the LEFT    EPIDURAL STEROID INJECTION INTO CERVICAL SPINE N/A 9/25/2019    Procedure: Injection-steroid-epidural-cervical;  Surgeon: Phil Ann MD;  Location: [facility] OR;  Service: Pain Management;  Laterality: N/A;    EPIDURAL STEROID INJECTION INTO LUMBAR SPINE N/A 11/5/2019    Procedure: Injection-steroid-epidural- lumbar L5/S1;  Surgeon: Phil Ann MD;  Location: [facility] OR;  Service: Pain Management;  Laterality: N/A;    EYE SURGERY      MULTIPLE TOOTH EXTRACTIONS         History reviewed. No pertinent family history.    Social History     Socioeconomic History    Marital status:      Spouse name: Not on file    Number of children: 3    Years of education: Not on file    Highest education level: Not on file   Occupational History    Not on file   Social Needs    Financial resource strain: Not on file    Food insecurity:     Worry: Not on file     Inability: Not on file    Transportation needs:     Medical: Not on file     Non-medical: Not on file   Tobacco Use    Smoking status: Current Some Day Smoker     Packs/day: 0.25     Last attempt to quit:  2015     Years since quittin.7    Smokeless tobacco: Never Used    Tobacco comment: smokes once per month   Substance and Sexual Activity    Alcohol use: No    Drug use: No    Sexual activity: Not on file   Lifestyle    Physical activity:     Days per week: Not on file     Minutes per session: Not on file    Stress: Not on file   Relationships    Social connections:     Talks on phone: Not on file     Gets together: Not on file     Attends Druze service: Not on file     Active member of club or organization: Not on file     Attends meetings of clubs or organizations: Not on file     Relationship status: Not on file   Other Topics Concern    Not on file   Social History Narrative    Not on file       No current facility-administered medications for this encounter.        Review of patient's allergies indicates:   Allergen Reactions    Ace inhibitors Swelling       Vitals:    20 1306   BP: 134/78   Pulse: 60   Resp: 16   Temp: 97.3 °F (36.3 °C)   TempSrc: Skin   SpO2: 100%   Weight: 88 kg (194 lb)   Height: 6' (1.829 m)       ASA 2, mallampati 2      REVIEW OF SYSTEMS:     GENERAL: No weight loss, malaise or fevers.  HEENT:  No recent changes in vision or hearing  NECK: Negative for lumps, no difficulty with swallowing.  RESPIRATORY: Negative for cough, wheezing or shortness of breath, patient denies any recent URI.  CARDIOVASCULAR: Negative for chest pain, leg swelling or palpitations.  GI: Negative for abdominal discomfort, blood in stools or black stools or change in bowel habits.  MUSCULOSKELETAL: See HPI.  SKIN: Negative for lesions, rash, and itching.  PSYCH: No suicidal or homicidal ideations, no current mood disturbances.  HEMATOLOGY/LYMPHOLOGY: Negative for prolonged bleeding, bruising easily or swollen nodes. Patient is not currently taking any anti-coagulants  ENDO: No history of diabetes or thyroid dysfunction  NEURO: No history of syncope, paralysis, seizures or tremors.All  other reviewed and negative other than HPI.    Physical exam:  Gen: A and O x3, pleasant, well-groomed  Skin: No rashes or obvious lesions  HEENT: PERRLA, no obvious deformities on ears or in canals. No thyroid masses, trachea midline, no palpable lymph nodes in neck, axilla.  CVS: Regular rate and rhythm, normal S1 and S2, no murmurs.  Resp: Clear to auscultation bilaterally.  Abdomen: Soft, NT/ND, normal bowel sounds present.  Musculoskeletal/Neuro: Moving all extremities    Assessment:  Cervical radiculopathy  -     Case Request Operating Room: Injection-steroid-epidural-cervical  -     Place in Outpatient; Standing  -     Diet NPO; Standing  -     lactated ringers infusion  -     Notify physician ; Standing  -     Notify physician ; Standing  -     Notify physician (specify); Standing  -     Place 18-22 gauage peripheral IV ; Standing  -     Verify informed consent; Standing  -     Vital signs; Standing    Other orders  -     Progressive Mobility Protocol (mobilize patient to their highest level of functioning at least twice daily); Standing  -     IP VTE LOW RISK PATIENT; Standing  -     lidocaine (PF) 10 mg/ml (1%) injection  -     methylPREDNISolone acetate injection  -     sodium chloride 0.9% injection  -     iohexol (OMNIPAQUE 300) injection  -     lidocaine HCL 10 mg/ml (1%) injection 1 mL

## 2020-02-07 NOTE — DISCHARGE SUMMARY
[facility]  Discharge Note  Short Stay    Admit Date: 2/7/2020    Discharge Date: 2/7/2020    Attending Physician: Phil Ann MD     Discharge Provider: Phil Ann    Diagnoses:  Active Hospital Problems    Diagnosis  POA    *Cervical radiculopathy [M54.12]  Yes      Resolved Hospital Problems   No resolved problems to display.       Discharged Condition: good    Final Diagnoses: Cervical radiculopathy [M54.12]    Disposition: Home or Self Care    Hospital Course: no complications, uneventful    Outcome of Hospitalization, Treatment, Procedure, or Surgery:  Patient was admitted for outpatient procedure. The patient underwent procedure without complications and are discharged home    Follow up/Patient Instructions:  Follow up as scheduled in Pain Management clinic in 3-4 weeks/Patient has received instructions and follow up date and time    Medications:  Continue previous medications    Discharge Procedure Orders   Call MD for:  temperature >100.4     Call MD for:  severe uncontrolled pain     Call MD for:  redness, tenderness, or signs of infection (pain, swelling, redness, odor or green/yellow discharge around incision site)     Call MD for:  severe persistent headache     No dressing needed         Discharge Procedure Orders (must include Diet, Follow-up, Activity):   Discharge Procedure Orders (must include Diet, Follow-up, Activity)   Call MD for:  temperature >100.4     Call MD for:  severe uncontrolled pain     Call MD for:  redness, tenderness, or signs of infection (pain, swelling, redness, odor or green/yellow discharge around incision site)     Call MD for:  severe persistent headache     No dressing needed

## 2020-02-07 NOTE — OP NOTE
PROCEDURE DATE: 2/7/2020    Procedure: C7-T1 cervical interlaminar epidural steroid injection under utilizing fluoroscopy.    Diagnosis: Cervical Radiculopathy    POSTOP DIAGNOSIS: SAME    Physician: Phil Ann MD    Medications injected:  Methylprednisone 80mg followed by a slow injection of 4 mL sterile, preservative-free normal saline.    Local anesthetic used: Lidocaine 1%, 4 ml.    Sedation Medications: 2mg versed    Complications:  none    Estimated blood loss: none    Technique:  A time-out was taken to identify patient and procedure prior to starting the procedure.  With the patient laying in a prone position with the neck in a mid-flexed forward position, the area was prepped and draped in the usual sterile fashion using ChloraPrep and a fenestrated drape.  The area was determined under AP fluoroscopic guidance.  Local anesthetic was given using a 25-gauge 1.5 inch needle by raising a wheal and then infiltrating ventrally.  A 3.5 inch 20-gauge Touhy needle was introduced under fluoroscopic guidance to meet the lamina of C7.  The needle was then hinged under the lamina then advanced using loss of resistance technique.  Once the tip of the needle was in the desired position, the contrast dye Omnipaque was injected to determine placement and no uptake.  The steroid was then injected slowly followed by a slow injection of 4 mL of the sterile preservative-free normal saline.  The patient tolerated the procedure well.    The patient was monitored after the procedure and was given post-procedure and discharge instructions to follow at home. The patient was discharged in a stable condition.      Event Time In Time Out   In Facility 1134    In Pre-Procedure 1252    Physician Available     Anesthesia Available     Pre-Op: Bedside Procedure Start     Pre-Op: Bedside Procedure Stop     Pre-Procedure Complete 1326    Out of Pre-Procedure     Anesthesia Start     Anesthesia Start Data Collection     Setup Start      Setup Complete     In Room 1331    Prep Start     Procedure Prep Complete     Procedure Start 1338    Procedure Closing     Emergence     Procedure Finish 1340    Sedation Start 1330    Scope In     Extent Reached     Scope Out     Sedation End 1343    Out of Room 1343    Cleanup Start     Cleanup Complete     Cosmetic Start     Cosmetic Stop     Pain Mgmt In Room     Pain Mgmt Out Room     In Recovery     Anesthesia Finish     Bedside Procedure Start     Bedside Procedure Stop     Recovery Care Complete     Out of Recovery     To Phase II     In Phase II     Pain Mgmt Recovery Start     Pain Mgmt Recovery Stop     Obs Rec Start     Obs Rec Stop     Phase II Care Complete     Out of Phase II     Procedural Care Complete     Discharge     Pain Follow Up Needed     Pain Follow Up Complete

## 2020-02-10 VITALS
RESPIRATION RATE: 16 BRPM | HEIGHT: 72 IN | SYSTOLIC BLOOD PRESSURE: 146 MMHG | HEART RATE: 59 BPM | BODY MASS INDEX: 26.28 KG/M2 | WEIGHT: 194 LBS | TEMPERATURE: 97 F | OXYGEN SATURATION: 99 % | DIASTOLIC BLOOD PRESSURE: 84 MMHG

## 2020-03-01 ENCOUNTER — PATIENT OUTREACH (OUTPATIENT)
Dept: ADMINISTRATIVE | Facility: OTHER | Age: 65
End: 2020-03-01

## 2020-03-12 ENCOUNTER — PATIENT OUTREACH (OUTPATIENT)
Dept: ADMINISTRATIVE | Facility: OTHER | Age: 65
End: 2020-03-12

## 2020-03-16 ENCOUNTER — OFFICE VISIT (OUTPATIENT)
Dept: PAIN MEDICINE | Facility: CLINIC | Age: 65
End: 2020-03-16
Payer: MEDICARE

## 2020-03-16 ENCOUNTER — TELEPHONE (OUTPATIENT)
Dept: PAIN MEDICINE | Facility: CLINIC | Age: 65
End: 2020-03-16

## 2020-03-16 VITALS
WEIGHT: 211.31 LBS | HEART RATE: 61 BPM | BODY MASS INDEX: 28.66 KG/M2 | TEMPERATURE: 98 F | SYSTOLIC BLOOD PRESSURE: 132 MMHG | DIASTOLIC BLOOD PRESSURE: 74 MMHG | RESPIRATION RATE: 18 BRPM

## 2020-03-16 DIAGNOSIS — M51.36 DDD (DEGENERATIVE DISC DISEASE), LUMBAR: ICD-10-CM

## 2020-03-16 DIAGNOSIS — M48.02 CERVICAL STENOSIS OF SPINE: Primary | ICD-10-CM

## 2020-03-16 DIAGNOSIS — M54.12 CERVICAL RADICULOPATHY: ICD-10-CM

## 2020-03-16 DIAGNOSIS — M48.062 SPINAL STENOSIS OF LUMBAR REGION WITH NEUROGENIC CLAUDICATION: ICD-10-CM

## 2020-03-16 PROCEDURE — 99999 PR PBB SHADOW E&M-EST. PATIENT-LVL V: CPT | Mod: PBBFAC,,, | Performed by: PHYSICIAN ASSISTANT

## 2020-03-16 PROCEDURE — 99999 PR PBB SHADOW E&M-EST. PATIENT-LVL V: ICD-10-PCS | Mod: PBBFAC,,, | Performed by: PHYSICIAN ASSISTANT

## 2020-03-16 PROCEDURE — 99214 PR OFFICE/OUTPT VISIT, EST, LEVL IV, 30-39 MIN: ICD-10-PCS | Mod: S$PBB,,, | Performed by: PHYSICIAN ASSISTANT

## 2020-03-16 PROCEDURE — 99214 OFFICE O/P EST MOD 30 MIN: CPT | Mod: S$PBB,,, | Performed by: PHYSICIAN ASSISTANT

## 2020-03-16 PROCEDURE — 99215 OFFICE O/P EST HI 40 MIN: CPT | Mod: PBBFAC,PN | Performed by: PHYSICIAN ASSISTANT

## 2020-03-16 RX ORDER — SODIUM CHLORIDE, SODIUM LACTATE, POTASSIUM CHLORIDE, CALCIUM CHLORIDE 600; 310; 30; 20 MG/100ML; MG/100ML; MG/100ML; MG/100ML
INJECTION, SOLUTION INTRAVENOUS CONTINUOUS
Status: CANCELLED | OUTPATIENT
Start: 2020-04-01

## 2020-03-16 NOTE — TELEPHONE ENCOUNTER
----- Message from Patricia Jones sent at 3/16/2020  1:03 PM CDT -----    Type: Needs Medical Advice    Who Called:pt  Best Call Back Number: [phone]  Additional Information:   Pt is calling to see if the  Alysha  Is  Still on  For  Today /  Pt stated someone  Called // no  Documentation   For  Us  To see // alysha  Still  There  No  Notes please call   Before  Pt leaves  To come in today // pt  Travels a  Good distance

## 2020-03-16 NOTE — PROGRESS NOTES
This note was completed with dictation software and grammatical errors may exist.    CC: Neck pain, back pain    HPI: The patient is a 64-year-old man with a history of hypertension, chronic neck and back pain who presents in referral from [facility] for continued neck and back pain.  He is status post C7-T1 interlaminar epidural steroid injection on 02/07/2020 with about 50% relief.  He continues to complain of neck pain radiating to the left upper arm.  This is bothering him more than his low back pain at this time.  The pain is constant, worse with activity and improved with rest and medication.  He reports some intermittent left upper extremity weakness.  He denies numbness or incontinence.    Pain intervention history: [facility] gabapentin, Robaxin, capsaicin cream for his neck.  He has had lumbar epidural steroid injections but nothing for his neck.  He is status post C7-T1 cervical interlaminar epidural steroid injection on 8/22/16 with what sounds like moderate relief.  He is status post C7-T1 cervical interlaminar epidural steroid on 7/24/17 with 25% relief.  He is not interested in doing any physical therapy.  He is status post C7-T1 cervical interlaminar epidural steroid injection on 9/12/17 with what sounds like excellent relief.  He is status post L5/S1 interlaminar epidural steroid injection on 10/31/17 with 30% relief.  He is status post L5/S1 interlaminar epidural steroid injection on 1/15/18 with 40% relief.  He is status post C7-T1 cervical interlaminar epidural steroid injection on 02/04/2019 with 40% relief.   He is status post C7-T1 interlaminar epidural steroid injection on 09/25/2019 with 40% relief.  He is status post L5/S1 interlaminar epidural steroid injection on 11/05/2019 with 40-50% relief.  He is status post C7-T1 interlaminar epidural steroid injection on 02/07/2020 with about 50% relief.     From 1/24/2019 visit:  [facility] reports [facility]  [facility] who [facility] against cervical or lumbar spine surgery.  The patient also asked him about an opioid pump and spinal cord stimulation. He states the surgeon also recommended against these due to the possibility of developing keloids.     ROS: He reports back pain, neck pain.  Balance of review of systems is negative.    Medical, surgical, family and social history reviewed elsewhere in record.    Medications/Allergies: See med card    Vitals:    03/16/20 1458   BP: 132/74   Pulse: 61   Resp: 18   Temp: 97.5 °F (36.4 °C)   TempSrc: Oral   Weight: 95.8 kg (211 lb 5 oz)   PainSc:   6   PainLoc: Neck         Physical exam:  Gen: A and O x3, pleasant, well-groomed  Skin: No rashes or obvious lesions  HEENT: PERRLA, no obvious deformities on ears or in canals.Trachea midline.  CVS: Regular rate and rhythm, normal palpable pulses.  Resp: Clear to auscultation bilaterally, no wheezes or rales.  Abdomen: Soft, NT/ND.  Musculoskeletal:  Antalgic gait due to left knee pain.  Wearing a knee brace.    Neuro:  Upper extremities: 5/5 strength bilaterally   Lower extremities: 5/5 strength bilaterally  Reflexes: Brachioradialis 2+, Bicep 2+, Tricep 2+.  Patellar 2+, Achilles 2+ bilaterally.  Sensory: Intact and symmetrical to light touch and pinprick in C2-T1 dermatomes bilaterally.  Intact and symmetrical to light touch and pinprick in L2-S1 dermatomes bilaterally.    Cervical Spine:  Cervical spine: ROM is moderately limited with left lateral rotation with increased left neck and left trapezius muscle pain. Range of motion has mildly improved.  Range of motion is mildly limited with flexion, extension right lateral rotation with mild increased left neck pain.  Spurling's maneuver causes no neck pain to either side.  Myofascial exam:  No tenderness to palpation to the cervical paraspinous muscles.    Lumbar spine:  Lumbar spine: ROM is mildly limited with flexion moderately limited with extension and oblique  extension with increased low back pain during each maneuver but especially with extension.  Juma's test causes no increased pain on either side.    Supine straight leg raise causes posterior thigh pain bilaterally.  Internal and external rotation of the hip causes no increased pain on either side.  Myofascial exam: No tenderness to palpation across lumbar paraspinous muscles.      Imaging:  MRI cervical spine 5/16/16: C1/2 normal.  C2/3 mild facet arthropathy.  At C3/4 mild facet arthropathy.  C4/5 mild small broad-based posterior disc bulge and facet arthrosis resulting in ventral CSF space effacement and mild ventral cord flattening.  At C5/6 there is broad-based disc bulge with focal left paracentral disc protrusion bilateral facet arthrosis and left uncovertebral spurring resulting in severe left neural foraminal narrowing, ventral CSF space effacement and mild ventral cord flattening.  At C6/7 there is broad-based posterior disc bulge and osteophytes bilateral uncovertebral spurring and facet arthropathy resulting in partial ventral CSF space effacement, mild ventral cord flattening and severe bilateral foraminal stenosis.  At C7/T1 there is broad-based posterior disc bulge with bilateral facet arthrosis and left uncovertebral spurring resulting in partial CSF space effacement and ventral cord flattening and severe left neural foraminal stenosis.    CT lumbar spine report 7/15/11  L2-3 mild disc bulge  L3-4 mild bulging of the disc with flattening of the ventral thecal sac resulting in mild narrowing of the foramina  L4-5 mild bulging of the disc is present along with ligamentum flavum hypertrophy combine to result in a moderate degree of canal stenosis with mild foraminal narrowing    MRI lumbar spine 8/25/17  At the T12-L1 normal, mild ligamentous hypertrophy is noted, no significant central canal stenosis or neural foraminal stenosis is noted.  At the L1-L2 level, mild ligamentous hypertrophy appears to  be present,  no significant disk bulge, central canal stenosis, or neural foraminal stenosis is noted  At L2-L3 level, broad-based bulge or protrusion appears to be present of 4 mm with asymmetric bulging towards each neural foramen. Mild bilateral neural foraminal stenosis is noted. Mild central canal narrowing is noted to 9 mm. Mild bilateral lateral recess narrowing is noted.  At L3-L4 level, asymmetric bulging is noted towards the left neural foramen slightly greater than right of approximately 3 mm, mild left greater than right neural foraminal narrowing is noted without significant central canal stenosis. Mild facet arthropathy and ligamentous hypertrophy is noted.  At the L4-L5 level, broad-based protrusion appears to be present centrally of 6 mm with increased T2 signal suggestive of an annular tear. Ligamentous hypertrophy and facet arthropathy appears to be present. Moderate central canal stenosis appears to be present with bunching of the nerve roots and central canal narrowing to 7 mm. Bilateral lateral recess stenosis is noted. Mild/moderate bilateral neural foraminal narrowing is noted slightly greater to the right than left.  At the L5-S1 level, broad-based bulge is noted centrally of 3 mm with increased T2 signal this can be seen with an annular tear, mild left greater than right neural foraminal stenosis appears to be present. Mild central canal narrowing is noted.      Assessment:  The patient is a 64-year-old man with a history of hypertension, chronic neck and back pain who presents in referral from [facility] for continued neck and back pain.   1. Cervical stenosis of spine     2. Cervical radiculopathy  Vital signs    [facility]     Verify informed consent    Notify physician     Notify physician     Notify physician (specify)    Diet NPO    Case Request Operating Room: Injection-steroid-epidural-cervical    Place in Outpatient    lactated ringers infusion    3. DDD (degenerative disc disease), lumbar     4. Spinal stenosis of lumbar region with neurogenic claudication         Plan:  1.  The patient did well following the cervical SOLIS and I will set him up to repeat this to see if he can get closer to full relief.  He is a high risk patient with hypertension.  We will need to obtain approval to hold aspirin prior to his procedure.  2.  We can consider repeating a lumbar SOLIS in the future when necessary.  3.  Follow-up in 4 weeks postprocedure or sooner as needed.

## 2020-04-02 ENCOUNTER — TELEPHONE (OUTPATIENT)
Dept: FAMILY MEDICINE | Facility: CLINIC | Age: 65
End: 2020-04-02

## 2020-04-02 NOTE — TELEPHONE ENCOUNTER
Left message on vm for patient to return call, to ask if he would like to change upcoming visit to video visit

## 2020-04-08 ENCOUNTER — OFFICE VISIT (OUTPATIENT)
Dept: FAMILY MEDICINE | Facility: CLINIC | Age: 65
End: 2020-04-08
Payer: MEDICARE

## 2020-04-08 VITALS — HEART RATE: 78 BPM | SYSTOLIC BLOOD PRESSURE: 135 MMHG | DIASTOLIC BLOOD PRESSURE: 84 MMHG | TEMPERATURE: 98 F

## 2020-04-08 DIAGNOSIS — M54.12 CERVICAL RADICULOPATHY: ICD-10-CM

## 2020-04-08 DIAGNOSIS — I10 BENIGN ESSENTIAL HTN: ICD-10-CM

## 2020-04-08 DIAGNOSIS — M51.36 DDD (DEGENERATIVE DISC DISEASE), LUMBAR: ICD-10-CM

## 2020-04-08 DIAGNOSIS — M48.02 CERVICAL STENOSIS OF SPINE: Primary | ICD-10-CM

## 2020-04-08 DIAGNOSIS — M54.16 LUMBAR RADICULOPATHY: ICD-10-CM

## 2020-04-08 PROCEDURE — 99214 OFFICE O/P EST MOD 30 MIN: CPT | Mod: S$PBB,,, | Performed by: FAMILY MEDICINE

## 2020-04-08 PROCEDURE — 99211 OFF/OP EST MAY X REQ PHY/QHP: CPT | Mod: PBBFAC,PO | Performed by: FAMILY MEDICINE

## 2020-04-08 PROCEDURE — 99214 PR OFFICE/OUTPT VISIT, EST, LEVL IV, 30-39 MIN: ICD-10-PCS | Mod: S$PBB,,, | Performed by: FAMILY MEDICINE

## 2020-04-08 PROCEDURE — 99999 PR PBB SHADOW E&M-EST. PATIENT-LVL I: CPT | Mod: PBBFAC,,, | Performed by: FAMILY MEDICINE

## 2020-04-08 PROCEDURE — 99999 PR PBB SHADOW E&M-EST. PATIENT-LVL I: ICD-10-PCS | Mod: PBBFAC,,, | Performed by: FAMILY MEDICINE

## 2020-04-08 RX ORDER — MONTELUKAST SODIUM 10 MG/1
10 TABLET ORAL NIGHTLY
Qty: 90 TABLET | Refills: 4 | Status: SHIPPED | OUTPATIENT
Start: 2020-04-08 | End: 2020-05-08

## 2020-04-08 RX ORDER — HYDROCODONE BITARTRATE AND ACETAMINOPHEN 10; 325 MG/1; MG/1
1 TABLET ORAL 4 TIMES DAILY
Qty: 360 TABLET | Refills: 0 | Status: SHIPPED | OUTPATIENT
Start: 2020-04-08 | End: 2020-07-06 | Stop reason: SDUPTHER

## 2020-04-08 NOTE — PROGRESS NOTES
The patient presents today fu chronic pain management generally tolerating with current HC 10 qid    Hx cervical stenosis and lumbar disc disease w chonic pain and radiculopathy. He has DJD todd bilateral knees.  HBP and HLP controlled     Past Medical History:  Past Medical History:   Diagnosis Date    Anticoagulant long-term use     aspirn    Asthma     Cervical stenosis of spine     DDD (degenerative disc disease), lumbar     Depression     DJD (degenerative joint disease)     Hyperlipidemia     Hypertension     Intervertebral disc protrusion     Seasonal allergies     Skin abnormalities      Past Surgical History:   Procedure Laterality Date    cervical injection      COLONOSCOPY      EPIDURAL STEROID INJECTION INTO CERVICAL SPINE N/A 2/4/2019    Procedure: Injection-steroid-epidural-cervical;  Surgeon: Phil Ann MD;  Location: [facility] OR;  Service: Pain Management;  Laterality: N/A;  to the LEFT    EPIDURAL STEROID INJECTION INTO CERVICAL SPINE N/A 9/25/2019    Procedure: Injection-steroid-epidural-cervical;  Surgeon: Phil Ann MD;  Location: [facility] OR;  Service: Pain Management;  Laterality: N/A;    EPIDURAL STEROID INJECTION INTO CERVICAL SPINE N/A 2/7/2020    Procedure: Injection-steroid-epidural-cervical;  Surgeon: Phil Ann MD;  Location: [facility] OR;  Service: Pain Management;  Laterality: N/A;    EPIDURAL STEROID INJECTION INTO LUMBAR SPINE N/A 11/5/2019    Procedure: Injection-steroid-epidural- lumbar L5/S1;  Surgeon: Phil Ann MD;  Location: [facility] OR;  Service: Pain Management;  Laterality: N/A;    EYE SURGERY      MULTIPLE TOOTH EXTRACTIONS       Review of patient's allergies indicates:   Allergen Reactions    Ace inhibitors Swelling     Current Outpatient Medications on File Prior to Visit   Medication Sig Dispense Refill    albuterol 90 mcg/actuation inhaler Inhale 2 puffs into the lungs every 6 (six) hours as needed for Wheezing. 54 g 4     amlodipine (NORVASC) 10 MG tablet Take 1 tablet (10 mg total) by mouth once daily. 90 tablet 3    aspirin 81 MG Chew Take 1 tablet (81 mg total) by mouth once daily. 90 tablet 3    atenolol (TENORMIN) 25 MG tablet Take 0.5 tablets (12.5 mg total) by mouth once daily. 90 tablet 3    diltiaZEM (DILACOR XR) 180 MG CDCR Take 1 capsule (180 mg total) by mouth 2 (two) times daily. 180 capsule 3    gabapentin (NEURONTIN) 300 MG capsule Take 2 capsules (600 mg total) by mouth 3 (three) times daily. 540 capsule 3    hydrochlorothiazide (HYDRODIURIL) 12.5 MG Tab Take 1 tablet (12.5 mg total) by mouth once daily. 90 tablet 3    HYDROcodone-acetaminophen (NORCO)  mg per tablet Take 1 tablet by mouth 4 (four) times daily. 360 tablet 0    ibuprofen (ADVIL,MOTRIN) 800 MG tablet Take 1 tablet (800 mg total) by mouth 4 (four) times daily. 360 tablet 3    loratadine (CLARITIN) 10 mg tablet Take 1 tablet (10 mg total) by mouth once daily. 90 tablet 3    LUBRICANT EYE, POLYV ALCOHOL, 1.4 % ophthalmic solution       meloxicam (MOBIC) 15 MG tablet Take 0.5 tablets (7.5 mg total) by mouth once daily. 90 tablet 3    mupirocin (BACTROBAN) 2 % ointment Apply topically 3 (three) times daily. 1 Tube 0    potassium chloride (KLOR-CON) 10 MEQ TbSR Take 2 tablets (20 mEq total) by mouth 2 (two) times daily. 360 tablet 3    simvastatin (ZOCOR) 20 MG tablet Take 1 tablet (20 mg total) by mouth every evening. 90 tablet 3    SYMBICORT 80-4.5 mcg/actuation HFAA 2 puffs daily as needed.       travoprost, benzalkonium, (TRAVATAN) 0.004 % ophthalmic solution Place 1 drop into both eyes nightly. 5 mL 3     No current facility-administered medications on file prior to visit.      Social History     Socioeconomic History    Marital status:      Spouse name: Not on file    Number of children: 3    Years of education: Not on file    Highest education level: Not on file   Occupational History    Not on file   Social Needs     Financial resource strain: Not on file    Food insecurity:     Worry: Not on file     Inability: Not on file    Transportation needs:     Medical: Not on file     Non-medical: Not on file   Tobacco Use    Smoking status: Current Some Day Smoker     Packs/day: 0.25     Last attempt to quit: 2015     Years since quittin.9    Smokeless tobacco: Never Used    Tobacco comment: smokes once per month   Substance and Sexual Activity    Alcohol use: No    Drug use: No    Sexual activity: Not on file   Lifestyle    Physical activity:     Days per week: Not on file     Minutes per session: Not on file    Stress: Not on file   Relationships    Social connections:     Talks on phone: Not on file     Gets together: Not on file     Attends [employer] service: Not on file     Active member of club or organization: Not on file     Attends meetings of clubs or organizations: Not on file     Relationship status: Not on file   Other Topics Concern    Not on file   Social History Narrative    Not on file     No family history on file.      ROS:GENERAL: No fever, chills, fatigability or weight loss.  SKIN: No rashes, itching or changes in color or texture of skin.  HEAD: No headaches or recent head trauma.EYES: Visual acuity fine. No photophobia, ocular pain or diplopia.EARS: Denies ear pain, discharge or vertigo.NOSE: No loss of smell, no epistaxis or postnasal drip.MOUTH & THROAT: No hoarseness or change in voice. No excessive gum bleeding.NODES: Denies swollen glands.  CHEST: Denies CHESTER, cyanosis, wheezing, cough and sputum production.  CARDIOVASCULAR: Denies chest pain, PND, orthopnea or reduced exercise tolerance.  ABDOMEN: Appetite fine. No weight loss. Denies diarrhea, abdominal pain, hematemesis or blood in stool.  URINARY: No flank pain, dysuria or hematuria.  PERIPHERAL VASCULAR: No claudication or cyanosis.  MUSCULOSKELETAL: See above.  NEUROLOGIC: No history of seizures, paralysis, alteration of gait or  coordination.  PE:   HEAD: Normocephalic, atraumatic.EYES: PERRL. EOMI.   EARS: TM's intact. Light reflex normal. No retraction or perforation.   NOSE: Mucosa pink. Airway clear.MOUTH & THROAT: No tonsillar enlargement. No pharyngeal erythema or exudate. No stridor.  NODES: No cervical, axillary or inguinal lymph node enlargement.  CHEST: Lungs clear to auscultation except scattered ronchi   CARDIOVASCULAR: Normal S1, S2. No rubs, murmurs or gallops.  ABDOMEN: Bowel sounds normal. Not distended. Soft. No tenderness or masses.  MUSCULOSKELETAL: Tender bilateral paracervical paralumbar neg SLR, tender knees and shoulders l  NEUROLOGIC: Cranial Nerves: II-XII grossly intact.  Motor: 5/5 strength major flexors/extensors.  DTR's: Knees, Ankles 2+ and equal bilaterally; downgoing toes.  Sensory: Decr sensations hands  Gait & Posture: Antalgic gait     Impression: Cervical stenosis  Shoulder arthropathy  C/L Radiculopathy  Lt meniscal tear   HBP  HLP     Plan: Rev med recs   Cont current BP tx   Rev pain management    Reviewed meds ,pain management ,risks benefits meds   Rev  no concerns

## 2020-07-01 ENCOUNTER — TELEPHONE (OUTPATIENT)
Dept: PAIN MEDICINE | Facility: CLINIC | Age: 65
End: 2020-07-01

## 2020-07-01 ENCOUNTER — TELEPHONE (OUTPATIENT)
Dept: FAMILY MEDICINE | Facility: CLINIC | Age: 65
End: 2020-07-01

## 2020-07-01 DIAGNOSIS — Z13.9 SCREENING PROCEDURE: Primary | ICD-10-CM

## 2020-07-01 NOTE — TELEPHONE ENCOUNTER
Spoke with patient, patient needs 3 mo med refill appt, date and time given, gave info to Kaylie to schedule

## 2020-07-01 NOTE — TELEPHONE ENCOUNTER
----- Message from Leslie Slaughter sent at 7/1/2020  1:52 PM CDT -----  Regarding: appángel Sellers calling regarding Appointment Access  (message) for an appt to be scheduled with the  [phone]

## 2020-07-01 NOTE — TELEPHONE ENCOUNTER
Spoke with patient and the cervical SOLIS has been rescheduled for 7/27. Pre-op instructions reviewed and the covid test was rescheduled.

## 2020-07-06 ENCOUNTER — OFFICE VISIT (OUTPATIENT)
Dept: FAMILY MEDICINE | Facility: CLINIC | Age: 65
End: 2020-07-06
Payer: MEDICARE

## 2020-07-06 VITALS
WEIGHT: 216 LBS | SYSTOLIC BLOOD PRESSURE: 137 MMHG | HEART RATE: 57 BPM | BODY MASS INDEX: 29.26 KG/M2 | TEMPERATURE: 98 F | DIASTOLIC BLOOD PRESSURE: 85 MMHG | HEIGHT: 72 IN

## 2020-07-06 DIAGNOSIS — M51.36 DDD (DEGENERATIVE DISC DISEASE), LUMBAR: Primary | ICD-10-CM

## 2020-07-06 DIAGNOSIS — M48.02 CERVICAL STENOSIS OF SPINE: ICD-10-CM

## 2020-07-06 DIAGNOSIS — M54.16 LUMBAR RADICULOPATHY: ICD-10-CM

## 2020-07-06 DIAGNOSIS — M54.12 CERVICAL RADICULOPATHY: ICD-10-CM

## 2020-07-06 PROCEDURE — 99999 PR PBB SHADOW E&M-EST. PATIENT-LVL III: ICD-10-PCS | Mod: PBBFAC,,, | Performed by: FAMILY MEDICINE

## 2020-07-06 PROCEDURE — 99213 OFFICE O/P EST LOW 20 MIN: CPT | Mod: PBBFAC,PO | Performed by: FAMILY MEDICINE

## 2020-07-06 PROCEDURE — 99213 PR OFFICE/OUTPT VISIT, EST, LEVL III, 20-29 MIN: ICD-10-PCS | Mod: S$PBB,,, | Performed by: FAMILY MEDICINE

## 2020-07-06 PROCEDURE — 99213 OFFICE O/P EST LOW 20 MIN: CPT | Mod: S$PBB,,, | Performed by: FAMILY MEDICINE

## 2020-07-06 PROCEDURE — 99999 PR PBB SHADOW E&M-EST. PATIENT-LVL III: CPT | Mod: PBBFAC,,, | Performed by: FAMILY MEDICINE

## 2020-07-06 RX ORDER — HYDROCODONE BITARTRATE AND ACETAMINOPHEN 10; 325 MG/1; MG/1
1 TABLET ORAL 4 TIMES DAILY
Qty: 360 TABLET | Refills: 0 | Status: SHIPPED | OUTPATIENT
Start: 2020-07-06 | End: 2020-10-01 | Stop reason: SDUPTHER

## 2020-07-06 NOTE — PROGRESS NOTES
The patient presents today fu chronic pain management generally tolerating with current HC 10 qid    Hx cervical stenosis and lumbar disc disease w chonic pain and radiculopathy. He has DJD todd bilateral knees.  HBP and HLP controlled     Past Medical History:  Past Medical History:   Diagnosis Date    Anticoagulant long-term use     aspirn    Asthma     Cervical stenosis of spine     DDD (degenerative disc disease), lumbar     Depression     DJD (degenerative joint disease)     Hyperlipidemia     Hypertension     Intervertebral disc protrusion     Seasonal allergies     Skin abnormalities      Past Surgical History:   Procedure Laterality Date    cervical injection      COLONOSCOPY      EPIDURAL STEROID INJECTION INTO CERVICAL SPINE N/A 2/4/2019    Procedure: Injection-steroid-epidural-cervical;  Surgeon: Phil Ann MD;  Location: [facility] OR;  Service: Pain Management;  Laterality: N/A;  to the LEFT    EPIDURAL STEROID INJECTION INTO CERVICAL SPINE N/A 9/25/2019    Procedure: Injection-steroid-epidural-cervical;  Surgeon: Phil Ann MD;  Location: [facility] OR;  Service: Pain Management;  Laterality: N/A;    EPIDURAL STEROID INJECTION INTO CERVICAL SPINE N/A 2/7/2020    Procedure: Injection-steroid-epidural-cervical;  Surgeon: Phil Ann MD;  Location: [facility] OR;  Service: Pain Management;  Laterality: N/A;    EPIDURAL STEROID INJECTION INTO LUMBAR SPINE N/A 11/5/2019    Procedure: Injection-steroid-epidural- lumbar L5/S1;  Surgeon: Phil Ann MD;  Location: [facility] OR;  Service: Pain Management;  Laterality: N/A;    EYE SURGERY      MULTIPLE TOOTH EXTRACTIONS       Review of patient's allergies indicates:   Allergen Reactions    Ace inhibitors Swelling     Current Outpatient Medications on File Prior to Visit   Medication Sig Dispense Refill    albuterol 90 mcg/actuation inhaler Inhale 2 puffs into the lungs every 6 (six) hours as needed for Wheezing. 54 g 4     amlodipine (NORVASC) 10 MG tablet Take 1 tablet (10 mg total) by mouth once daily. 90 tablet 3    aspirin 81 MG Chew Take 1 tablet (81 mg total) by mouth once daily. 90 tablet 3    atenolol (TENORMIN) 25 MG tablet Take 0.5 tablets (12.5 mg total) by mouth once daily. 90 tablet 3    diltiaZEM (DILACOR XR) 180 MG CDCR Take 1 capsule (180 mg total) by mouth 2 (two) times daily. 180 capsule 3    gabapentin (NEURONTIN) 300 MG capsule Take 2 capsules (600 mg total) by mouth 3 (three) times daily. 540 capsule 3    hydrochlorothiazide (HYDRODIURIL) 12.5 MG Tab Take 1 tablet (12.5 mg total) by mouth once daily. 90 tablet 3    HYDROcodone-acetaminophen (NORCO)  mg per tablet Take 1 tablet by mouth 4 (four) times daily. 360 tablet 0    ibuprofen (ADVIL,MOTRIN) 800 MG tablet Take 1 tablet (800 mg total) by mouth 4 (four) times daily. 360 tablet 3    loratadine (CLARITIN) 10 mg tablet Take 1 tablet (10 mg total) by mouth once daily. 90 tablet 3    LUBRICANT EYE, POLYV ALCOHOL, 1.4 % ophthalmic solution       meloxicam (MOBIC) 15 MG tablet Take 0.5 tablets (7.5 mg total) by mouth once daily. 90 tablet 3    potassium chloride (KLOR-CON) 10 MEQ TbSR Take 2 tablets (20 mEq total) by mouth 2 (two) times daily. 360 tablet 3    simvastatin (ZOCOR) 20 MG tablet Take 1 tablet (20 mg total) by mouth every evening. 90 tablet 3    SYMBICORT 80-4.5 mcg/actuation HFAA 2 puffs daily as needed.       travoprost, benzalkonium, (TRAVATAN) 0.004 % ophthalmic solution Place 1 drop into both eyes nightly. 5 mL 3    mupirocin (BACTROBAN) 2 % ointment Apply topically 3 (three) times daily. (Patient not taking: Reported on 7/6/2020) 1 Tube 0     No current facility-administered medications on file prior to visit.      Social History     Socioeconomic History    Marital status:      Spouse name: Not on file    Number of children: 3    Years of education: Not on file    Highest education level: Not on file   Occupational  History    Not on file   Social Needs    Financial resource strain: Not on file    Food insecurity     Worry: Not on file     Inability: Not on file    Transportation needs     Medical: Not on file     Non-medical: Not on file   Tobacco Use    Smoking status: Current Some Day Smoker     Packs/day: 0.25     Last attempt to quit: 2015     Years since quittin.1    Smokeless tobacco: Never Used    Tobacco comment: smokes once per month   Substance and Sexual Activity    Alcohol use: No    Drug use: No    Sexual activity: Not on file   Lifestyle    Physical activity     Days per week: Not on file     Minutes per session: Not on file    Stress: Not on file   Relationships    Social connections     Talks on phone: Not on file     Gets together: Not on file     Attends [employer] service: Not on file     Active member of club or organization: Not on file     Attends meetings of clubs or organizations: Not on file     Relationship status: Not on file   Other Topics Concern    Not on file   Social History Narrative    Not on file     History reviewed. No pertinent family history.      ROS:GENERAL: No fever, chills, fatigability or weight loss.  SKIN: No rashes, itching or changes in color or texture of skin.  HEAD: No headaches or recent head trauma.EYES: Visual acuity fine. No photophobia, ocular pain or diplopia.EARS: Denies ear pain, discharge or vertigo.NOSE: No loss of smell, no epistaxis or postnasal drip.MOUTH & THROAT: No hoarseness or change in voice. No excessive gum bleeding.NODES: Denies swollen glands.  CHEST: Denies CHESTER, cyanosis, wheezing, cough and sputum production.  CARDIOVASCULAR: Denies chest pain, PND, orthopnea or reduced exercise tolerance.  ABDOMEN: Appetite fine. No weight loss. Denies diarrhea, abdominal pain, hematemesis or blood in stool.  URINARY: No flank pain, dysuria or hematuria.  PERIPHERAL VASCULAR: No claudication or cyanosis.  MUSCULOSKELETAL: See above.  NEUROLOGIC:  No history of seizures, paralysis, alteration of gait or coordination.  PE:   HEAD: Normocephalic, atraumatic.EYES: PERRL. EOMI.   EARS: TM's intact. Light reflex normal. No retraction or perforation.   NOSE: Mucosa pink. Airway clear.MOUTH & THROAT: No tonsillar enlargement. No pharyngeal erythema or exudate. No stridor.  NODES: No cervical, axillary or inguinal lymph node enlargement.  CHEST: Lungs clear to auscultation except scattered ronchi   CARDIOVASCULAR: Normal S1, S2. No rubs, murmurs or gallops.  ABDOMEN: Bowel sounds normal. Not distended. Soft. No tenderness or masses.  MUSCULOSKELETAL: Tender bilateral paracervical paralumbar neg SLR, tender knees and shoulders   NEUROLOGIC: Cranial Nerves: II-XII grossly intact.  Motor: 5/5 strength major flexors/extensors.  DTR's: Knees, Ankles 2+ and equal bilaterally; downgoing toes.  Sensory: Decr sensations hands  Gait & Posture: Antalgic gait     Impression: Cervical stenosis  Shoulder arthropathy  C/L Radiculopathy  Lt meniscal tear   HBP  HLP     Plan: Rev med recs   Cont current BP tx   Rev pain management    Reviewed meds ,pain management ,risks benefits meds   Rev  no concerns

## 2020-07-24 ENCOUNTER — LAB VISIT (OUTPATIENT)
Dept: FAMILY MEDICINE | Facility: CLINIC | Age: 65
End: 2020-07-24
Payer: MEDICARE

## 2020-07-24 DIAGNOSIS — Z13.9 SCREENING PROCEDURE: ICD-10-CM

## 2020-07-24 PROCEDURE — U0003 INFECTIOUS AGENT DETECTION BY NUCLEIC ACID (DNA OR RNA); SEVERE ACUTE RESPIRATORY SYNDROME CORONAVIRUS 2 (SARS-COV-2) (CORONAVIRUS DISEASE [COVID-19]), AMPLIFIED PROBE TECHNIQUE, MAKING USE OF HIGH THROUGHPUT TECHNOLOGIES AS DESCRIBED BY CMS-2020-01-R: HCPCS

## 2020-07-25 LAB — SARS-COV-2 RNA RESP QL NAA+PROBE: NOT DETECTED

## 2020-07-27 ENCOUNTER — HOSPITAL ENCOUNTER (OUTPATIENT)
Dept: RADIOLOGY | Facility: HOSPITAL | Age: 65
Discharge: HOME | End: 2020-07-27
Attending: ANESTHESIOLOGY
Payer: MEDICARE

## 2020-07-27 ENCOUNTER — HOSPITAL ENCOUNTER (OUTPATIENT)
Facility: HOSPITAL | Age: 65
Discharge: HOME | End: 2020-07-27
Attending: ANESTHESIOLOGY | Admitting: ANESTHESIOLOGY
Payer: MEDICARE

## 2020-07-27 VITALS
BODY MASS INDEX: 27.09 KG/M2 | SYSTOLIC BLOOD PRESSURE: 166 MMHG | TEMPERATURE: 98 F | RESPIRATION RATE: 18 BRPM | OXYGEN SATURATION: 98 % | HEART RATE: 75 BPM | WEIGHT: 200 LBS | HEIGHT: 72 IN | DIASTOLIC BLOOD PRESSURE: 100 MMHG

## 2020-07-27 DIAGNOSIS — M54.12 CERVICAL RADICULOPATHY: ICD-10-CM

## 2020-07-27 DIAGNOSIS — M54.12 CERVICAL RADICULOPATHY: Primary | ICD-10-CM

## 2020-07-27 PROCEDURE — 62321 NJX INTERLAMINAR CRV/THRC: CPT | Mod: PO | Performed by: ANESTHESIOLOGY

## 2020-07-27 PROCEDURE — 63600175 PHARM REV CODE 636 W HCPCS: Mod: PO | Performed by: ANESTHESIOLOGY

## 2020-07-27 PROCEDURE — A4216 STERILE WATER/SALINE, 10 ML: HCPCS | Mod: PO | Performed by: ANESTHESIOLOGY

## 2020-07-27 PROCEDURE — 25500020 PHARM REV CODE 255: Mod: PO | Performed by: ANESTHESIOLOGY

## 2020-07-27 PROCEDURE — 76000 FLUOROSCOPY <1 HR PHYS/QHP: CPT | Mod: TC,PO

## 2020-07-27 PROCEDURE — 62321 PR INJ CERV/THORAC, W/GUIDANCE: ICD-10-PCS | Mod: ,,, | Performed by: ANESTHESIOLOGY

## 2020-07-27 PROCEDURE — 62321 NJX INTERLAMINAR CRV/THRC: CPT | Mod: ,,, | Performed by: ANESTHESIOLOGY

## 2020-07-27 PROCEDURE — 25000003 PHARM REV CODE 250: Mod: PO | Performed by: ANESTHESIOLOGY

## 2020-07-27 RX ORDER — LIDOCAINE HYDROCHLORIDE 10 MG/ML
INJECTION, SOLUTION EPIDURAL; INFILTRATION; INTRACAUDAL; PERINEURAL
Status: DISCONTINUED | OUTPATIENT
Start: 2020-07-27 | End: 2020-07-27 | Stop reason: HOSPADM

## 2020-07-27 RX ORDER — METHYLPREDNISOLONE ACETATE 80 MG/ML
INJECTION, SUSPENSION INTRA-ARTICULAR; INTRALESIONAL; INTRAMUSCULAR; SOFT TISSUE
Status: DISCONTINUED | OUTPATIENT
Start: 2020-07-27 | End: 2020-07-27 | Stop reason: HOSPADM

## 2020-07-27 RX ORDER — SODIUM CHLORIDE 9 MG/ML
INJECTION, SOLUTION INTRAMUSCULAR; INTRAVENOUS; SUBCUTANEOUS
Status: DISCONTINUED | OUTPATIENT
Start: 2020-07-27 | End: 2020-07-27 | Stop reason: HOSPADM

## 2020-07-27 RX ORDER — SODIUM CHLORIDE, SODIUM LACTATE, POTASSIUM CHLORIDE, CALCIUM CHLORIDE 600; 310; 30; 20 MG/100ML; MG/100ML; MG/100ML; MG/100ML
INJECTION, SOLUTION INTRAVENOUS CONTINUOUS
Status: DISCONTINUED | OUTPATIENT
Start: 2020-07-27 | End: 2020-07-27 | Stop reason: HOSPADM

## 2020-07-27 RX ORDER — MIDAZOLAM HYDROCHLORIDE 2 MG/2ML
INJECTION, SOLUTION INTRAMUSCULAR; INTRAVENOUS
Status: DISCONTINUED | OUTPATIENT
Start: 2020-07-27 | End: 2020-07-27 | Stop reason: HOSPADM

## 2020-07-27 RX ADMIN — SODIUM CHLORIDE, SODIUM LACTATE, POTASSIUM CHLORIDE, AND CALCIUM CHLORIDE: .6; .31; .03; .02 INJECTION, SOLUTION INTRAVENOUS at 02:07

## 2020-07-27 NOTE — DISCHARGE SUMMARY
[facility]  Discharge Note  Short Stay    Admit Date: 7/27/2020    Discharge Date: 7/27/2020    Attending Physician: Phil Ann MD     Discharge Provider: Phil Ann    Diagnoses:  Active Hospital Problems    Diagnosis  POA    *Cervical radiculopathy [M54.12]  Yes      Resolved Hospital Problems   No resolved problems to display.       Discharged Condition: good    Final Diagnoses: Cervical radiculopathy [M54.12]    Disposition: Home or Self Care    Hospital Course: no complications, uneventful    Outcome of Hospitalization, Treatment, Procedure, or Surgery:  Patient was admitted for outpatient procedure. The patient underwent procedure without complications and are discharged home    Follow up/Patient Instructions:  Follow up as scheduled in Pain Management clinic in 3-4 weeks/Patient has received instructions and follow up date and time    Medications:  Continue previous medications    Discharge Procedure Orders   Call MD for:  temperature >100.4     Call MD for:  severe uncontrolled pain     Call MD for:  redness, tenderness, or signs of infection (pain, swelling, redness, odor or green/yellow discharge around incision site)     Call MD for:  severe persistent headache     No dressing needed         Discharge Procedure Orders (must include Diet, Follow-up, Activity):   Discharge Procedure Orders (must include Diet, Follow-up, Activity)   Call MD for:  temperature >100.4     Call MD for:  severe uncontrolled pain     Call MD for:  redness, tenderness, or signs of infection (pain, swelling, redness, odor or green/yellow discharge around incision site)     Call MD for:  severe persistent headache     No dressing needed

## 2020-07-27 NOTE — H&P
CC: Neck pain    HPI: The patient is a 65yo man with a history of cervical radiculopathy here for Cervical SOLIS. There are no major changes in history and physical from 3/16/20.    Past Medical History:   Diagnosis Date    Anticoagulant long-term use     aspirn    Asthma     Cervical stenosis of spine     DDD (degenerative disc disease), lumbar     Depression     DJD (degenerative joint disease)     Hyperlipidemia     Hypertension     Intervertebral disc protrusion     Seasonal allergies     Skin abnormalities        Past Surgical History:   Procedure Laterality Date    cervical injection      COLONOSCOPY      EPIDURAL STEROID INJECTION INTO CERVICAL SPINE N/A 2/4/2019    Procedure: Injection-steroid-epidural-cervical;  Surgeon: Pihl Ann MD;  Location: [facility] OR;  Service: Pain Management;  Laterality: N/A;  to the LEFT    EPIDURAL STEROID INJECTION INTO CERVICAL SPINE N/A 9/25/2019    Procedure: Injection-steroid-epidural-cervical;  Surgeon: Phil Ann MD;  Location: [facility] OR;  Service: Pain Management;  Laterality: N/A;    EPIDURAL STEROID INJECTION INTO CERVICAL SPINE N/A 2/7/2020    Procedure: Injection-steroid-epidural-cervical;  Surgeon: Phil Ann MD;  Location: [facility] OR;  Service: Pain Management;  Laterality: N/A;    EPIDURAL STEROID INJECTION INTO LUMBAR SPINE N/A 11/5/2019    Procedure: Injection-steroid-epidural- lumbar L5/S1;  Surgeon: Phil Ann MD;  Location: [facility] OR;  Service: Pain Management;  Laterality: N/A;    EYE SURGERY      MULTIPLE TOOTH EXTRACTIONS         History reviewed. No pertinent family history.    Social History     Socioeconomic History    Marital status:      Spouse name: Not on file    Number of children: 3    Years of education: Not on file    Highest education level: Not on file   Occupational History    Not on file   Social Needs    Financial resource strain: Not on file    Food insecurity     Worry: Not on file      Inability: Not on file    Transportation needs     Medical: Not on file     Non-medical: Not on file   Tobacco Use    Smoking status: Current Some Day Smoker     Packs/day: 0.25     Last attempt to quit: 2015     Years since quittin.2    Smokeless tobacco: Never Used    Tobacco comment: smokes once per month   Substance and Sexual Activity    Alcohol use: No    Drug use: No    Sexual activity: Not on file   Lifestyle    Physical activity     Days per week: Not on file     Minutes per session: Not on file    Stress: Not on file   Relationships    Social connections     Talks on phone: Not on file     Gets together: Not on file     Attends Yazidism service: Not on file     Active member of club or organization: Not on file     Attends meetings of clubs or organizations: Not on file     Relationship status: Not on file   Other Topics Concern    Not on file   Social History Narrative    Not on file       No current facility-administered medications for this encounter.        Review of patient's allergies indicates:   Allergen Reactions    Ace inhibitors Swelling       Vitals:    20 1615 20 1400   BP:  (!) 141/79   Pulse:  67   Resp:  18   Temp:  97.3 °F (36.3 °C)   TempSrc:  Skin   SpO2:  99%   Weight: 90.7 kg (200 lb)    Height: 6' (1.829 m)      ASA 2, mallampati 2    REVIEW OF SYSTEMS:     GENERAL: No weight loss, malaise or fevers.  HEENT:  No recent changes in vision or hearing  NECK: Negative for lumps, no difficulty with swallowing.  RESPIRATORY: Negative for cough, wheezing or shortness of breath, patient denies any recent URI.  CARDIOVASCULAR: Negative for chest pain, leg swelling or palpitations.  GI: Negative for abdominal discomfort, blood in stools or black stools or change in bowel habits.  MUSCULOSKELETAL: See HPI.  SKIN: Negative for lesions, rash, and itching.  PSYCH: No suicidal or homicidal ideations, no current mood disturbances.  HEMATOLOGY/LYMPHOLOGY: Negative  for prolonged bleeding, bruising easily or swollen nodes. Patient is not currently taking any anti-coagulants  ENDO: No history of diabetes or thyroid dysfunction  NEURO: No history of syncope, paralysis, seizures or tremors.All other reviewed and negative other than HPI.    Physical exam:  Gen: A and O x3, pleasant, well-groomed  Skin: No rashes or obvious lesions  HEENT: PERRLA, no obvious deformities on ears or in canals. No thyroid masses, trachea midline, no palpable lymph nodes in neck, axilla.  CVS: Regular rate and rhythm, normal S1 and S2, no murmurs.  Resp: Clear to auscultation bilaterally.  Abdomen: Soft, NT/ND, normal bowel sounds present.  Musculoskeletal/Neuro: Moving all extremities    Assessment:  Cervical radiculopathy  -     Case Request Operating Room: Injection-steroid-epidural-cervical  -     Place in Outpatient; Standing  -     Diet NPO; Standing  -     lactated ringers infusion  -     Notify physician ; Standing  -     Notify physician ; Standing  -     Notify physician (specify); Standing  -     Place 18-22 gauage peripheral IV ; Standing  -     Verify informed consent; Standing  -     Vital signs; Standing    Other orders  -     Progressive Mobility Protocol (mobilize patient to their highest level of functioning at least twice daily); Standing  -     IP VTE LOW RISK PATIENT; Standing

## 2020-07-27 NOTE — OP NOTE
PROCEDURE DATE: 7/27/2020    Procedure: C7-T1 cervical interlaminar epidural steroid injection under utilizing fluoroscopy.    Diagnosis: Cervical Radiculopathy    POSTOP DIAGNOSIS: SAME    Physician: Phil Ann MD    Medications injected:  Methylprednisone 80mg followed by a slow injection of 4 mL sterile, preservative-free normal saline.    Local anesthetic used: Lidocaine 1%, 4 ml.    Sedation Medications: 2mg versed    Complications:  none    Estimated blood loss: none    Technique:  A time-out was taken to identify patient and procedure prior to starting the procedure.  With the patient laying in a prone position with the neck in a mid-flexed forward position, the area was prepped and draped in the usual sterile fashion using ChloraPrep and a fenestrated drape.  The area was determined under AP fluoroscopic guidance.  Local anesthetic was given using a 25-gauge 1.5 inch needle by raising a wheal and then infiltrating ventrally.  A 3.5 inch 20-gauge Touhy needle was introduced under fluoroscopic guidance to meet the lamina of C7.  The needle was then hinged under the lamina then advanced using loss of resistance technique.  Once the tip of the needle was in the desired position, the contrast dye Omnipaque was injected to determine placement and no uptake.  The steroid was then injected slowly followed by a slow injection of 4 mL of the sterile preservative-free normal saline.  The patient tolerated the procedure well.    The patient was monitored after the procedure and was given post-procedure and discharge instructions to follow at home. The patient was discharged in a stable condition.    Event Time In   In Facility 1240   In Pre-Procedure 1329   Physician Available    Anesthesia Available    Pre-Op: Bedside Procedure Start    Pre-Op: Bedside Procedure Stop    Pre-Procedure Complete 1351   Out of Pre-Procedure    Anesthesia Start    Anesthesia Start Data Collection    Setup Start    Setup Complete    In  Room 1407   Prep Start    Procedure Prep Complete    Procedure Start 1416   Procedure Closing    Emergence    Procedure Finish 1419   Sedation Start    Scope In    Extent Reached    Scope Out    Sedation End 1423   Out of Room 1423   Cleanup Start    Cleanup Complete    Cosmetic Start    Cosmetic Stop    Pain Mgmt In Room    Pain Mgmt Out Room    In Recovery    Anesthesia Finish    Bedside Procedure Start    Bedside Procedure Stop    Recovery Care Complete    Out of Recovery    To Phase II    In Phase II    Pain Mgmt Recovery Start    Pain Mgmt Recovery Stop    Obs Rec Start    Obs Rec Stop    Phase II Care Complete    Out of Phase II    Procedural Care Complete    Discharge    Pain Follow Up Needed    Pain Follow Up Complete      Moderate sedation was achieved with midazolam 2mg.  Continuous monitoring of EKG, blood pressure and pulse oximetry was provided by a registered nurse during the entire course of the procedure under my supervision and recorded in the patient's medical record.   Total time for sedation was 16 minutes.

## 2020-08-20 ENCOUNTER — PATIENT OUTREACH (OUTPATIENT)
Dept: ADMINISTRATIVE | Facility: OTHER | Age: 65
End: 2020-08-20

## 2020-08-20 NOTE — PROGRESS NOTES
Health Maintenance Due   Topic Date Due    Shingles Vaccine (1 of 2) 08/29/2003    Pneumococcal Vaccine (65+ Low/Medium Risk) (1 of 2 - PCV13) 08/29/2018    Abdominal Aortic Aneurysm Screening  08/29/2018    Colorectal Cancer Screening  06/08/2020     Updates were requested from care everywhere.  Chart was reviewed for overdue Proactive [facility] Encounters (SHENG) topics (CRS, Breast Cancer Screening, Eye exam)  Health Maintenance has been updated.  LINKS immunization registry triggered.  Immunizations were reconciled.

## 2020-09-02 ENCOUNTER — OFFICE VISIT (OUTPATIENT)
Dept: PAIN MEDICINE | Facility: CLINIC | Age: 65
End: 2020-09-02
Payer: MEDICARE

## 2020-09-02 VITALS
SYSTOLIC BLOOD PRESSURE: 136 MMHG | DIASTOLIC BLOOD PRESSURE: 78 MMHG | HEIGHT: 72 IN | BODY MASS INDEX: 27.09 KG/M2 | RESPIRATION RATE: 18 BRPM | WEIGHT: 200 LBS | HEART RATE: 56 BPM

## 2020-09-02 DIAGNOSIS — G89.29 CHRONIC PAIN OF LEFT KNEE: ICD-10-CM

## 2020-09-02 DIAGNOSIS — M51.36 DDD (DEGENERATIVE DISC DISEASE), LUMBAR: ICD-10-CM

## 2020-09-02 DIAGNOSIS — M48.02 CERVICAL STENOSIS OF SPINE: ICD-10-CM

## 2020-09-02 DIAGNOSIS — M54.16 LUMBAR RADICULOPATHY: ICD-10-CM

## 2020-09-02 DIAGNOSIS — M48.062 SPINAL STENOSIS OF LUMBAR REGION WITH NEUROGENIC CLAUDICATION: ICD-10-CM

## 2020-09-02 DIAGNOSIS — M54.12 CERVICAL RADICULOPATHY: Primary | ICD-10-CM

## 2020-09-02 DIAGNOSIS — M25.562 CHRONIC PAIN OF LEFT KNEE: ICD-10-CM

## 2020-09-02 PROCEDURE — 99214 PR OFFICE/OUTPT VISIT, EST, LEVL IV, 30-39 MIN: ICD-10-PCS | Mod: S$PBB,,, | Performed by: PHYSICIAN ASSISTANT

## 2020-09-02 PROCEDURE — 99999 PR PBB SHADOW E&M-EST. PATIENT-LVL IV: CPT | Mod: PBBFAC,,, | Performed by: PHYSICIAN ASSISTANT

## 2020-09-02 PROCEDURE — 99214 OFFICE O/P EST MOD 30 MIN: CPT | Mod: PBBFAC,PN | Performed by: PHYSICIAN ASSISTANT

## 2020-09-02 PROCEDURE — 99999 PR PBB SHADOW E&M-EST. PATIENT-LVL IV: ICD-10-PCS | Mod: PBBFAC,,, | Performed by: PHYSICIAN ASSISTANT

## 2020-09-02 PROCEDURE — 99214 OFFICE O/P EST MOD 30 MIN: CPT | Mod: S$PBB,,, | Performed by: PHYSICIAN ASSISTANT

## 2020-09-06 NOTE — PROGRESS NOTES
This note was completed with dictation software and grammatical errors may exist.    CC: Neck pain, back pain    HPI: The patient is a 65-year-old man with a history of hypertension, chronic neck and back pain who presents in referral from [facility] for continued neck and back pain.  He is status post C7-T1 interlaminar epidural steroid injection on 07/27/2020 with 50% relief.  He continues to have some neck and left upper arm pain but this is much more tolerable following the injection.  He also complains of low back pain with radiation into his legs.  He feels that his neck pain is more constant but currently his back pain can become more severe at times.  He reports weakness in his arms and his legs in continued left knee pain.  He denies numbness, bladder or bowel incontinence.    Pain intervention history: [facility] tried gabapentin, Robaxin, capsaicin cream for his neck.  He has had lumbar epidural steroid injections but nothing for his neck.  He is status post C7-T1 cervical interlaminar epidural steroid injection on 8/22/16 with what sounds like moderate relief.  He is status post C7-T1 cervical interlaminar epidural steroid on 7/24/17 with 25% relief.  He is not interested in doing any physical therapy.  He is status post C7-T1 cervical interlaminar epidural steroid injection on 9/12/17 with what sounds like excellent relief.  He is status post L5/S1 interlaminar epidural steroid injection on 10/31/17 with 30% relief.  He is status post L5/S1 interlaminar epidural steroid injection on 1/15/18 with 40% relief.  He is status post C7-T1 cervical interlaminar epidural steroid injection on 02/04/2019 with 40% relief.   He is status post C7-T1 interlaminar epidural steroid injection on 09/25/2019 with 40% relief.  He is status post L5/S1 interlaminar epidural steroid injection on 11/05/2019 with 40-50% relief.  He is status post C7-T1 interlaminar epidural steroid  injection on 02/07/2020 with about 50% relief.   He is status post C7-T1 interlaminar epidural steroid injection on 07/27/2020 with 50% relief.      From 1/24/2019 visit:  [facility] who suggested against cervical or lumbar spine surgery.  The patient also asked him about an opioid pump and spinal cord stimulation. He states the surgeon also recommended against these due to the possibility of developing keloids.     ROS: He reports back pain, neck pain.  Balance of review of systems is negative.    Medical, surgical, family and social history reviewed elsewhere in record.    Medications/Allergies: See med card    Vitals:    09/02/20 1313   BP: 136/78   Pulse: (!) 56   Resp: 18   Weight: 90.7 kg (200 lb)   Height: 6' (1.829 m)   PainSc:   6   PainLoc: Neck         Physical exam:  Gen: A and O x3, pleasant, well-groomed  Skin: No rashes or obvious lesions  HEENT: PERRLA, no obvious deformities on ears or in canals.Trachea midline.  CVS: Regular rate and rhythm, normal palpable pulses.  Resp: Clear to auscultation bilaterally, no wheezes or rales.  Abdomen: Soft, NT/ND.  Musculoskeletal:  Antalgic gait due to left knee pain.  Wearing a knee brace.    Neuro:  Upper extremities: 5/5 strength bilaterally   Lower extremities: 5/5 strength bilaterally  Reflexes: Brachioradialis 2+, Bicep 2+, Tricep 2+.  Patellar 2+, Achilles 2+ bilaterally.  Sensory: Intact and symmetrical to light touch and pinprick in C2-T1 dermatomes bilaterally.  Intact and symmetrical to light touch and pinprick in L2-S1 dermatomes bilaterally.    Cervical Spine:  Cervical spine: ROM is moderately limited with left lateral rotation with increased left neck and left trapezius muscle pain. Range of motion has mildly improved.  Range of motion is mildly limited with flexion, extension right lateral rotation with mild increased left neck pain.  Spurling's maneuver causes no neck pain to either side.  Myofascial exam:  No  tenderness to palpation to the cervical paraspinous muscles.    Lumbar spine:  Lumbar spine: ROM is mildly limited with flexion moderately limited with extension and oblique extension with increased low back pain during each maneuver but especially with extension.  Juma's test causes no increased pain on either side.    Supine straight leg raise causes posterior thigh pain bilaterally.  Internal and external rotation of the hip causes no increased pain on either side.  Myofascial exam: No tenderness to palpation across lumbar paraspinous muscles.      Imaging:  MRI cervical spine 5/16/16: C1/2 normal.  C2/3 mild facet arthropathy.  At C3/4 mild facet arthropathy.  C4/5 mild small broad-based posterior disc bulge and facet arthrosis resulting in ventral CSF space effacement and mild ventral cord flattening.  At C5/6 there is broad-based disc bulge with focal left paracentral disc protrusion bilateral facet arthrosis and left uncovertebral spurring resulting in severe left neural foraminal narrowing, ventral CSF space effacement and mild ventral cord flattening.  At C6/7 there is broad-based posterior disc bulge and osteophytes bilateral uncovertebral spurring and facet arthropathy resulting in partial ventral CSF space effacement, mild ventral cord flattening and severe bilateral foraminal stenosis.  At C7/T1 there is broad-based posterior disc bulge with bilateral facet arthrosis and left uncovertebral spurring resulting in partial CSF space effacement and ventral cord flattening and severe left neural foraminal stenosis.    CT lumbar spine report 7/15/11  L2-3 mild disc bulge  L3-4 mild bulging of the disc with flattening of the ventral thecal sac resulting in mild narrowing of the foramina  L4-5 mild bulging of the disc is present along with ligamentum flavum hypertrophy combine to result in a moderate degree of canal stenosis with mild foraminal narrowing    MRI lumbar spine 8/25/17  At the T12-L1 normal, mild  ligamentous hypertrophy is noted, no significant central canal stenosis or neural foraminal stenosis is noted.  At the L1-L2 level, mild ligamentous hypertrophy appears to be present,  no significant disk bulge, central canal stenosis, or neural foraminal stenosis is noted  At L2-L3 level, broad-based bulge or protrusion appears to be present of 4 mm with asymmetric bulging towards each neural foramen. Mild bilateral neural foraminal stenosis is noted. Mild central canal narrowing is noted to 9 mm. Mild bilateral lateral recess narrowing is noted.  At L3-L4 level, asymmetric bulging is noted towards the left neural foramen slightly greater than right of approximately 3 mm, mild left greater than right neural foraminal narrowing is noted without significant central canal stenosis. Mild facet arthropathy and ligamentous hypertrophy is noted.  At the L4-L5 level, broad-based protrusion appears to be present centrally of 6 mm with increased T2 signal suggestive of an annular tear. Ligamentous hypertrophy and facet arthropathy appears to be present. Moderate central canal stenosis appears to be present with bunching of the nerve roots and central canal narrowing to 7 mm. Bilateral lateral recess stenosis is noted. Mild/moderate bilateral neural foraminal narrowing is noted slightly greater to the right than left.  At the L5-S1 level, broad-based bulge is noted centrally of 3 mm with increased T2 signal this can be seen with an annular tear, mild left greater than right neural foraminal stenosis appears to be present. Mild central canal narrowing is noted.      Assessment:  The patient is a 65-year-old man with a history of hypertension, chronic neck and back pain who presents in referral from [facility] for continued neck and back pain.   1. Cervical radiculopathy     2. Cervical stenosis of spine     3. Lumbar radiculopathy     4. Spinal stenosis of lumbar region with neurogenic claudication     5. DDD  (degenerative disc disease), lumbar     6. Chronic pain of left knee         Plan:  1.  The patient did well following the cervical SOLIS.  This can be repeated in the future if necessary.  2.  We discussed his low back pain any may want to consider an L5/S1 interlaminar SOLIS before the end of the year.  He will call to set this up for to repeat a cervical SOLIS if his neck pain worsens before then.      Greater than 50% of this 25 minutes visit was spent counseling the patient.

## 2020-10-01 ENCOUNTER — OFFICE VISIT (OUTPATIENT)
Dept: FAMILY MEDICINE | Facility: CLINIC | Age: 65
End: 2020-10-01
Payer: MEDICARE

## 2020-10-01 VITALS
SYSTOLIC BLOOD PRESSURE: 130 MMHG | WEIGHT: 216.19 LBS | DIASTOLIC BLOOD PRESSURE: 76 MMHG | HEIGHT: 72 IN | BODY MASS INDEX: 29.28 KG/M2 | HEART RATE: 71 BPM | TEMPERATURE: 98 F

## 2020-10-01 DIAGNOSIS — M51.36 DDD (DEGENERATIVE DISC DISEASE), LUMBAR: Primary | ICD-10-CM

## 2020-10-01 DIAGNOSIS — M54.16 LUMBAR RADICULOPATHY: ICD-10-CM

## 2020-10-01 DIAGNOSIS — M54.12 CERVICAL RADICULOPATHY: ICD-10-CM

## 2020-10-01 DIAGNOSIS — M48.02 CERVICAL STENOSIS OF SPINE: ICD-10-CM

## 2020-10-01 PROCEDURE — 99999 PR PBB SHADOW E&M-EST. PATIENT-LVL IV: CPT | Mod: PBBFAC,,, | Performed by: FAMILY MEDICINE

## 2020-10-01 PROCEDURE — 99214 OFFICE O/P EST MOD 30 MIN: CPT | Mod: PBBFAC,PO | Performed by: FAMILY MEDICINE

## 2020-10-01 PROCEDURE — 99999 PR PBB SHADOW E&M-EST. PATIENT-LVL IV: ICD-10-PCS | Mod: PBBFAC,,, | Performed by: FAMILY MEDICINE

## 2020-10-01 PROCEDURE — 99214 OFFICE O/P EST MOD 30 MIN: CPT | Mod: S$PBB,,, | Performed by: FAMILY MEDICINE

## 2020-10-01 PROCEDURE — 99214 PR OFFICE/OUTPT VISIT, EST, LEVL IV, 30-39 MIN: ICD-10-PCS | Mod: S$PBB,,, | Performed by: FAMILY MEDICINE

## 2020-10-01 RX ORDER — HYDROCODONE BITARTRATE AND ACETAMINOPHEN 10; 325 MG/1; MG/1
1 TABLET ORAL 4 TIMES DAILY
Qty: 360 TABLET | Refills: 0 | Status: SHIPPED | OUTPATIENT
Start: 2020-10-01 | End: 2020-12-30 | Stop reason: SDUPTHER

## 2020-10-01 NOTE — PROGRESS NOTES
The patient presents today fu chronic pain management generally tolerating with current HC 10 qid    Hx cervical stenosis and lumbar disc disease w chonic pain and radiculopathy. He has DJD todd bilateral knees.  HBP and HLP controlled     Past Medical History:  Past Medical History:   Diagnosis Date    Anticoagulant long-term use     aspirn    Asthma     Cervical stenosis of spine     DDD (degenerative disc disease), lumbar     Depression     DJD (degenerative joint disease)     Hx of colonic polyps 06/08/2015    per colonoscopy report in     Hyperlipidemia     Hypertension     Intervertebral disc protrusion     Seasonal allergies     Skin abnormalities      Past Surgical History:   Procedure Laterality Date    cervical injection      COLONOSCOPY      EPIDURAL STEROID INJECTION INTO CERVICAL SPINE N/A 2/4/2019    Procedure: Injection-steroid-epidural-cervical;  Surgeon: Phil Ann MD;  Location: [facility] OR;  Service: Pain Management;  Laterality: N/A;  to the LEFT    EPIDURAL STEROID INJECTION INTO CERVICAL SPINE N/A 9/25/2019    Procedure: Injection-steroid-epidural-cervical;  Surgeon: Phil Ann MD;  Location: [facility] OR;  Service: Pain Management;  Laterality: N/A;    EPIDURAL STEROID INJECTION INTO CERVICAL SPINE N/A 2/7/2020    Procedure: Injection-steroid-epidural-cervical;  Surgeon: Phil Ann MD;  Location: [facility] OR;  Service: Pain Management;  Laterality: N/A;    EPIDURAL STEROID INJECTION INTO CERVICAL SPINE N/A 7/27/2020    Procedure: Injection-steroid-epidural-cervical;  Surgeon: Phil Ann MD;  Location: [facility] OR;  Service: Pain Management;  Laterality: N/A;    EPIDURAL STEROID INJECTION INTO LUMBAR SPINE N/A 11/5/2019    Procedure: Injection-steroid-epidural- lumbar L5/S1;  Surgeon: Phil Ann MD;  Location: [facility] OR;  Service: Pain Management;  Laterality: N/A;    EYE SURGERY      MULTIPLE TOOTH EXTRACTIONS       Review of patient's allergies  indicates:   Allergen Reactions    Ace inhibitors Swelling     Current Outpatient Medications on File Prior to Visit   Medication Sig Dispense Refill    albuterol 90 mcg/actuation inhaler Inhale 2 puffs into the lungs every 6 (six) hours as needed for Wheezing. 54 g 4    amlodipine (NORVASC) 10 MG tablet Take 1 tablet (10 mg total) by mouth once daily. 90 tablet 3    aspirin 81 MG Chew Take 1 tablet (81 mg total) by mouth once daily. 90 tablet 3    atenolol (TENORMIN) 25 MG tablet Take 0.5 tablets (12.5 mg total) by mouth once daily. 90 tablet 3    diltiaZEM (DILACOR XR) 180 MG CDCR Take 1 capsule (180 mg total) by mouth 2 (two) times daily. 180 capsule 3    gabapentin (NEURONTIN) 300 MG capsule Take 2 capsules (600 mg total) by mouth 3 (three) times daily. 540 capsule 3    hydrochlorothiazide (HYDRODIURIL) 12.5 MG Tab Take 1 tablet (12.5 mg total) by mouth once daily. 90 tablet 3    HYDROcodone-acetaminophen (NORCO)  mg per tablet Take 1 tablet by mouth 4 (four) times daily. 360 tablet 0    ibuprofen (ADVIL,MOTRIN) 800 MG tablet Take 1 tablet (800 mg total) by mouth 4 (four) times daily. 360 tablet 3    loratadine (CLARITIN) 10 mg tablet Take 1 tablet (10 mg total) by mouth once daily. 90 tablet 3    LUBRICANT EYE, POLYV ALCOHOL, 1.4 % ophthalmic solution       meloxicam (MOBIC) 15 MG tablet Take 0.5 tablets (7.5 mg total) by mouth once daily. 90 tablet 3    mupirocin (BACTROBAN) 2 % ointment Apply topically 3 (three) times daily. 1 Tube 0    potassium chloride (KLOR-CON) 10 MEQ TbSR Take 2 tablets (20 mEq total) by mouth 2 (two) times daily. 360 tablet 3    simvastatin (ZOCOR) 20 MG tablet Take 1 tablet (20 mg total) by mouth every evening. 90 tablet 3    SYMBICORT 80-4.5 mcg/actuation HFAA 2 puffs daily as needed.       travoprost, benzalkonium, (TRAVATAN) 0.004 % ophthalmic solution Place 1 drop into both eyes nightly. 5 mL 3     No current facility-administered medications on file prior  to visit.      Social History     Socioeconomic History    Marital status:      Spouse name: Not on file    Number of children: 3    Years of education: Not on file    Highest education level: Not on file   Occupational History    Not on file   Social Needs    Financial resource strain: Not on file    Food insecurity     Worry: Not on file     Inability: Not on file    Transportation needs     Medical: Not on file     Non-medical: Not on file   Tobacco Use    Smoking status: Current Some Day Smoker     Packs/day: 0.25     Last attempt to quit: 2015     Years since quittin.3    Smokeless tobacco: Never Used    Tobacco comment: smokes once per month   Substance and Sexual Activity    Alcohol use: No    Drug use: No    Sexual activity: Not on file   Lifestyle    Physical activity     Days per week: Not on file     Minutes per session: Not on file    Stress: Not on file   Relationships    Social connections     Talks on phone: Not on file     Gets together: Not on file     Attends Faith service: Not on file     Active member of club or organization: Not on file     Attends meetings of clubs or organizations: Not on file     Relationship status: Not on file   Other Topics Concern    Not on file   Social History Narrative    Not on file     No family history on file.      ROS:GENERAL: No fever, chills, fatigability or weight loss.  SKIN: No rashes, itching or changes in color or texture of skin.  HEAD: No headaches or recent head trauma.EYES: Visual acuity fine. No photophobia, ocular pain or diplopia.EARS: Denies ear pain, discharge or vertigo.NOSE: No loss of smell, no epistaxis or postnasal drip.MOUTH & THROAT: No hoarseness or change in voice. No excessive gum bleeding.NODES: Denies swollen glands.  CHEST: Denies CHESTER, cyanosis, wheezing, cough and sputum production.  CARDIOVASCULAR: Denies chest pain, PND, orthopnea or reduced exercise tolerance.  ABDOMEN: Appetite fine. No  weight loss. Denies diarrhea, abdominal pain, hematemesis or blood in stool.  URINARY: No flank pain, dysuria or hematuria.  PERIPHERAL VASCULAR: No claudication or cyanosis.  MUSCULOSKELETAL: See above.  NEUROLOGIC: No history of seizures, paralysis, alteration of gait or coordination.  PE:   HEAD: Normocephalic, atraumatic.EYES: PERRL. EOMI.   EARS: TM's intact. Light reflex normal. No retraction or perforation.   NOSE: Mucosa pink. Airway clear.MOUTH & THROAT: No tonsillar enlargement. No pharyngeal erythema or exudate. No stridor.  NODES: No cervical, axillary or inguinal lymph node enlargement.  CHEST: Lungs clear to auscultation except scattered ronchi   CARDIOVASCULAR: Normal S1, S2. No rubs, murmurs or gallops.  ABDOMEN: Bowel sounds normal. Not distended. Soft. No tenderness or masses.  MUSCULOSKELETAL: Tender bilateral paracervical paralumbar neg SLR, tender knees and shoulders   NEUROLOGIC: Cranial Nerves: II-XII grossly intact.  Motor: 5/5 strength major flexors/extensors.  DTR's: Knees, Ankles 2+ and equal bilaterally; downgoing toes.  Sensory: Decr sensations hands  Gait & Posture: Antalgic gait     Impression: Cervical stenosis  Shoulder arthropathy  C/L Radiculopathy  Lt meniscal tear   HBP  HLP     Plan: Rev med recs   Cont current BP tx   Rev pain management    Reviewed meds ,pain management ,risks benefits meds   Rev  no concerns

## 2020-12-30 ENCOUNTER — OFFICE VISIT (OUTPATIENT)
Dept: FAMILY MEDICINE | Facility: CLINIC | Age: 65
End: 2020-12-30
Payer: MEDICARE

## 2020-12-30 VITALS
TEMPERATURE: 98 F | HEART RATE: 61 BPM | BODY MASS INDEX: 28.44 KG/M2 | WEIGHT: 210 LBS | DIASTOLIC BLOOD PRESSURE: 84 MMHG | SYSTOLIC BLOOD PRESSURE: 143 MMHG | HEIGHT: 72 IN | RESPIRATION RATE: 18 BRPM

## 2020-12-30 DIAGNOSIS — I10 BENIGN ESSENTIAL HTN: ICD-10-CM

## 2020-12-30 DIAGNOSIS — M48.02 CERVICAL STENOSIS OF SPINE: Primary | ICD-10-CM

## 2020-12-30 DIAGNOSIS — M51.36 DDD (DEGENERATIVE DISC DISEASE), LUMBAR: ICD-10-CM

## 2020-12-30 DIAGNOSIS — M54.12 CERVICAL RADICULOPATHY: ICD-10-CM

## 2020-12-30 DIAGNOSIS — E78.49 OTHER HYPERLIPIDEMIA: ICD-10-CM

## 2020-12-30 DIAGNOSIS — M54.16 LUMBAR RADICULOPATHY: ICD-10-CM

## 2020-12-30 PROCEDURE — 99213 OFFICE O/P EST LOW 20 MIN: CPT | Mod: S$PBB,,, | Performed by: FAMILY MEDICINE

## 2020-12-30 PROCEDURE — 99999 PR PBB SHADOW E&M-EST. PATIENT-LVL V: ICD-10-PCS | Mod: PBBFAC,,, | Performed by: FAMILY MEDICINE

## 2020-12-30 PROCEDURE — 99999 PR PBB SHADOW E&M-EST. PATIENT-LVL V: CPT | Mod: PBBFAC,,, | Performed by: FAMILY MEDICINE

## 2020-12-30 PROCEDURE — 99213 PR OFFICE/OUTPT VISIT, EST, LEVL III, 20-29 MIN: ICD-10-PCS | Mod: S$PBB,,, | Performed by: FAMILY MEDICINE

## 2020-12-30 PROCEDURE — 99215 OFFICE O/P EST HI 40 MIN: CPT | Mod: PBBFAC,PO | Performed by: FAMILY MEDICINE

## 2020-12-30 RX ORDER — HYDROCODONE BITARTRATE AND ACETAMINOPHEN 10; 325 MG/1; MG/1
1 TABLET ORAL 4 TIMES DAILY
Qty: 360 TABLET | Refills: 0 | Status: SHIPPED | OUTPATIENT
Start: 2020-12-30 | End: 2021-03-29 | Stop reason: SDUPTHER

## 2020-12-30 NOTE — PROGRESS NOTES
The patient presents today fu chronic pain management generally tolerating with current HC 10 qid    Hx cervical stenosis and lumbar disc disease w chonic pain and radiculopathy. 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  HBP and HLP controlled     Past Medical History:  Past Medical History:   Diagnosis Date    Anticoagulant long-term use     aspirn    Asthma     Cervical stenosis of spine     DDD (degenerative disc disease), lumbar     Depression     DJD (degenerative joint disease)     Hx of colonic polyps 06/08/2015    per colonoscopy report in     Hyperlipidemia     Hypertension     Intervertebral disc protrusion     Seasonal allergies     Skin abnormalities      Past Surgical History:   Procedure Laterality Date    cervical injection      COLONOSCOPY      EPIDURAL STEROID INJECTION INTO CERVICAL SPINE N/A 2/4/2019    Procedure: Injection-steroid-epidural-cervical;  Surgeon: Phil Ann MD;  Location: [facility] OR;  Service: Pain Management;  Laterality: N/A;  to the LEFT    EPIDURAL STEROID INJECTION INTO CERVICAL SPINE N/A 9/25/2019    Procedure: Injection-steroid-epidural-cervical;  Surgeon: Phil Ann MD;  Location: [facility] OR;  Service: Pain Management;  Laterality: N/A;    EPIDURAL STEROID INJECTION INTO CERVICAL SPINE N/A 2/7/2020    Procedure: Injection-steroid-epidural-cervical;  Surgeon: Phil Ann MD;  Location: [facility] OR;  Service: Pain Management;  Laterality: N/A;    EPIDURAL STEROID INJECTION INTO CERVICAL SPINE N/A 7/27/2020    Procedure: Injection-steroid-epidural-cervical;  Surgeon: Phil Ann MD;  Location: [facility] OR;  Service: Pain Management;  Laterality: N/A;    EPIDURAL STEROID INJECTION INTO LUMBAR SPINE N/A 11/5/2019    Procedure: Injection-steroid-epidural- lumbar L5/S1;  Surgeon: Phil Ann MD;  Location: [facility] OR;  Service: Pain Management;  Laterality: N/A;    EYE SURGERY      MULTIPLE TOOTH EXTRACTIONS       Review of patient's  allergies indicates:   Allergen Reactions    Ace inhibitors Swelling     Current Outpatient Medications on File Prior to Visit   Medication Sig Dispense Refill    albuterol 90 mcg/actuation inhaler Inhale 2 puffs into the lungs every 6 (six) hours as needed for Wheezing. 54 g 4    amlodipine (NORVASC) 10 MG tablet Take 1 tablet (10 mg total) by mouth once daily. 90 tablet 3    aspirin 81 MG Chew Take 1 tablet (81 mg total) by mouth once daily. 90 tablet 3    atenolol (TENORMIN) 25 MG tablet Take 0.5 tablets (12.5 mg total) by mouth once daily. 90 tablet 3    diltiaZEM (DILACOR XR) 180 MG CDCR Take 1 capsule (180 mg total) by mouth 2 (two) times daily. 180 capsule 3    gabapentin (NEURONTIN) 300 MG capsule Take 2 capsules (600 mg total) by mouth 3 (three) times daily. 540 capsule 3    hydrochlorothiazide (HYDRODIURIL) 12.5 MG Tab Take 1 tablet (12.5 mg total) by mouth once daily. 90 tablet 3    HYDROcodone-acetaminophen (NORCO)  mg per tablet Take 1 tablet by mouth 4 (four) times daily. 360 tablet 0    ibuprofen (ADVIL,MOTRIN) 800 MG tablet Take 1 tablet (800 mg total) by mouth 4 (four) times daily. 360 tablet 3    loratadine (CLARITIN) 10 mg tablet Take 1 tablet (10 mg total) by mouth once daily. 90 tablet 3    LUBRICANT EYE, POLYV ALCOHOL, 1.4 % ophthalmic solution       meloxicam (MOBIC) 15 MG tablet Take 0.5 tablets (7.5 mg total) by mouth once daily. 90 tablet 3    mupirocin (BACTROBAN) 2 % ointment Apply topically 3 (three) times daily. 1 Tube 0    potassium chloride (KLOR-CON) 10 MEQ TbSR Take 2 tablets (20 mEq total) by mouth 2 (two) times daily. 360 tablet 3    simvastatin (ZOCOR) 20 MG tablet Take 1 tablet (20 mg total) by mouth every evening. 90 tablet 3    SYMBICORT 80-4.5 mcg/actuation HFAA 2 puffs daily as needed.       travoprost, benzalkonium, (TRAVATAN) 0.004 % ophthalmic solution Place 1 drop into both eyes nightly. 5 mL 3     No current facility-administered medications on  file prior to visit.      Social History     Socioeconomic History    Marital status:      Spouse name: Not on file    Number of children: 3    Years of education: Not on file    Highest education level: Not on file   Occupational History    Not on file   Social Needs    Financial resource strain: Not on file    Food insecurity     Worry: Not on file     Inability: Not on file    Transportation needs     Medical: Not on file     Non-medical: Not on file   Tobacco Use    Smoking status: Current Some Day Smoker     Packs/day: 0.25     Last attempt to quit: 2015     Years since quittin.6    Smokeless tobacco: Never Used    Tobacco comment: smokes once per month   Substance and Sexual Activity    Alcohol use: No    Drug use: No    Sexual activity: Not on file   Lifestyle    Physical activity     Days per week: Not on file     Minutes per session: Not on file    Stress: Not on file   Relationships    Social connections     Talks on phone: Not on file     Gets together: Not on file     Attends Bahai service: Not on file     Active member of club or organization: Not on file     Attends meetings of clubs or organizations: Not on file     Relationship status: Not on file   Other Topics Concern    Not on file   Social History Narrative    Not on file     No family history on file.      ROS:GENERAL: No fever, chills, fatigability or weight loss.  SKIN: No rashes, itching or changes in color or texture of skin.  HEAD: No headaches or recent head trauma.EYES: Visual acuity fine. No photophobia, ocular pain or diplopia.EARS: Denies ear pain, discharge or vertigo.NOSE: No loss of smell, no epistaxis or postnasal drip.MOUTH & THROAT: No hoarseness or change in voice. No excessive gum bleeding.NODES: Denies swollen glands.  CHEST: Denies CHESTER, cyanosis, wheezing, cough and sputum production.  CARDIOVASCULAR: Denies chest pain, PND, orthopnea or reduced exercise tolerance.  ABDOMEN: Appetite  fine. No weight loss. Denies diarrhea, abdominal pain, hematemesis or blood in stool.  URINARY: No flank pain, dysuria or hematuria.  PERIPHERAL VASCULAR: No claudication or cyanosis.  MUSCULOSKELETAL: See above.  NEUROLOGIC: No history of seizures, paralysis, alteration of gait or coordination.  PE:   HEAD: Normocephalic, atraumatic.EYES: PERRL. EOMI.   EARS: TM's intact. Light reflex normal. No retraction or perforation.   NOSE: Mucosa pink. Airway clear.MOUTH & THROAT: No tonsillar enlargement. No pharyngeal erythema or exudate. No stridor.  NODES: No cervical, axillary or inguinal lymph node enlargement.  CHEST: Lungs clear to auscultation except scattered ronchi   CARDIOVASCULAR: Normal S1, S2. No rubs, murmurs or gallops.  ABDOMEN: Bowel sounds normal. Not distended. Soft. No tenderness or masses.  MUSCULOSKELETAL: Tender bilateral paracervical paralumbar neg SLR, tender knees and shoulders   NEUROLOGIC: Cranial Nerves: II-XII grossly intact.  Motor: 5/5 strength major flexors/extensors.  DTR's: Knees, Ankles 2+ and equal bilaterally; downgoing toes.  Sensory: Decr sensations hands  Gait & Posture: Antalgic gait     Impression: Cervical stenosis  Shoulder arthropathy  C/L Radiculopathy  Lt meniscal tear   HBP  HLP     Plan: Rev med recs   Cont current BP tx   Rev pain management    Reviewed meds ,pain management ,risks benefits meds   Rev  no concerns

## 2021-03-10 ENCOUNTER — PATIENT OUTREACH (OUTPATIENT)
Dept: ADMINISTRATIVE | Facility: HOSPITAL | Age: 66
End: 2021-03-10

## 2021-03-15 ENCOUNTER — PES CALL (OUTPATIENT)
Dept: ADMINISTRATIVE | Facility: CLINIC | Age: 66
End: 2021-03-15

## 2021-03-16 ENCOUNTER — PES CALL (OUTPATIENT)
Dept: ADMINISTRATIVE | Facility: CLINIC | Age: 66
End: 2021-03-16

## 2021-03-29 ENCOUNTER — OFFICE VISIT (OUTPATIENT)
Dept: FAMILY MEDICINE | Facility: CLINIC | Age: 66
End: 2021-03-29
Payer: MEDICARE

## 2021-03-29 VITALS
WEIGHT: 226.19 LBS | DIASTOLIC BLOOD PRESSURE: 75 MMHG | HEIGHT: 72 IN | BODY MASS INDEX: 30.64 KG/M2 | TEMPERATURE: 99 F | HEART RATE: 68 BPM | SYSTOLIC BLOOD PRESSURE: 123 MMHG

## 2021-03-29 DIAGNOSIS — M48.02 CERVICAL STENOSIS OF SPINE: ICD-10-CM

## 2021-03-29 DIAGNOSIS — M54.16 LUMBAR RADICULOPATHY: ICD-10-CM

## 2021-03-29 DIAGNOSIS — M51.36 DDD (DEGENERATIVE DISC DISEASE), LUMBAR: ICD-10-CM

## 2021-03-29 DIAGNOSIS — M54.12 CERVICAL RADICULOPATHY: Primary | ICD-10-CM

## 2021-03-29 PROCEDURE — 99213 OFFICE O/P EST LOW 20 MIN: CPT | Mod: PBBFAC,PO | Performed by: FAMILY MEDICINE

## 2021-03-29 PROCEDURE — 99214 OFFICE O/P EST MOD 30 MIN: CPT | Mod: S$PBB,,, | Performed by: FAMILY MEDICINE

## 2021-03-29 PROCEDURE — 99214 PR OFFICE/OUTPT VISIT, EST, LEVL IV, 30-39 MIN: ICD-10-PCS | Mod: S$PBB,,, | Performed by: FAMILY MEDICINE

## 2021-03-29 PROCEDURE — 99999 PR PBB SHADOW E&M-EST. PATIENT-LVL III: ICD-10-PCS | Mod: PBBFAC,,, | Performed by: FAMILY MEDICINE

## 2021-03-29 PROCEDURE — 99999 PR PBB SHADOW E&M-EST. PATIENT-LVL III: CPT | Mod: PBBFAC,,, | Performed by: FAMILY MEDICINE

## 2021-03-29 RX ORDER — HYDROCODONE BITARTRATE AND ACETAMINOPHEN 10; 325 MG/1; MG/1
1 TABLET ORAL 4 TIMES DAILY
Qty: 360 TABLET | Refills: 0 | Status: SHIPPED | OUTPATIENT
Start: 2021-03-29 | End: 2021-07-26 | Stop reason: SDUPTHER

## 2021-07-26 ENCOUNTER — OFFICE VISIT (OUTPATIENT)
Dept: FAMILY MEDICINE | Facility: CLINIC | Age: 66
End: 2021-07-26
Payer: MEDICARE

## 2021-07-26 VITALS
DIASTOLIC BLOOD PRESSURE: 76 MMHG | WEIGHT: 217 LBS | HEART RATE: 58 BPM | BODY MASS INDEX: 29.39 KG/M2 | HEIGHT: 72 IN | RESPIRATION RATE: 18 BRPM | SYSTOLIC BLOOD PRESSURE: 137 MMHG

## 2021-07-26 DIAGNOSIS — Z79.899 ENCOUNTER FOR LONG-TERM (CURRENT) USE OF MEDICATIONS: ICD-10-CM

## 2021-07-26 DIAGNOSIS — E78.49 OTHER HYPERLIPIDEMIA: ICD-10-CM

## 2021-07-26 DIAGNOSIS — M54.12 CERVICAL RADICULOPATHY: Primary | ICD-10-CM

## 2021-07-26 DIAGNOSIS — I10 BENIGN ESSENTIAL HTN: ICD-10-CM

## 2021-07-26 DIAGNOSIS — M51.36 DDD (DEGENERATIVE DISC DISEASE), LUMBAR: ICD-10-CM

## 2021-07-26 DIAGNOSIS — Z86.010 HISTORY OF COLON POLYPS: ICD-10-CM

## 2021-07-26 DIAGNOSIS — Z12.5 ENCOUNTER FOR PROSTATE CANCER SCREENING: ICD-10-CM

## 2021-07-26 PROCEDURE — 99214 PR OFFICE/OUTPT VISIT, EST, LEVL IV, 30-39 MIN: ICD-10-PCS | Mod: S$PBB,,, | Performed by: FAMILY MEDICINE

## 2021-07-26 PROCEDURE — 99999 PR PBB SHADOW E&M-EST. PATIENT-LVL IV: ICD-10-PCS | Mod: PBBFAC,,, | Performed by: FAMILY MEDICINE

## 2021-07-26 PROCEDURE — 99999 PR PBB SHADOW E&M-EST. PATIENT-LVL IV: CPT | Mod: PBBFAC,,, | Performed by: FAMILY MEDICINE

## 2021-07-26 PROCEDURE — 99214 OFFICE O/P EST MOD 30 MIN: CPT | Mod: PBBFAC,PO | Performed by: FAMILY MEDICINE

## 2021-07-26 PROCEDURE — 99214 OFFICE O/P EST MOD 30 MIN: CPT | Mod: S$PBB,,, | Performed by: FAMILY MEDICINE

## 2021-07-26 RX ORDER — HYDROCODONE BITARTRATE AND ACETAMINOPHEN 10; 325 MG/1; MG/1
1 TABLET ORAL 4 TIMES DAILY
Qty: 360 TABLET | Refills: 0 | Status: SHIPPED | OUTPATIENT
Start: 2021-07-26 | End: 2021-10-27 | Stop reason: SDUPTHER

## 2021-07-26 RX ORDER — HYDROCODONE BITARTRATE AND ACETAMINOPHEN 10; 325 MG/1; MG/1
1 TABLET ORAL 4 TIMES DAILY
Qty: 360 TABLET | Refills: 0 | Status: SHIPPED | OUTPATIENT
Start: 2021-07-26 | End: 2021-07-26 | Stop reason: SDUPTHER

## 2021-10-27 ENCOUNTER — OFFICE VISIT (OUTPATIENT)
Dept: FAMILY MEDICINE | Facility: CLINIC | Age: 66
End: 2021-10-27
Payer: MEDICARE

## 2021-10-27 VITALS
TEMPERATURE: 98 F | DIASTOLIC BLOOD PRESSURE: 80 MMHG | HEIGHT: 72 IN | WEIGHT: 206 LBS | SYSTOLIC BLOOD PRESSURE: 138 MMHG | HEART RATE: 67 BPM | OXYGEN SATURATION: 99 % | BODY MASS INDEX: 27.9 KG/M2

## 2021-10-27 DIAGNOSIS — Z79.899 ENCOUNTER FOR LONG-TERM (CURRENT) USE OF MEDICATIONS: ICD-10-CM

## 2021-10-27 DIAGNOSIS — M51.36 DDD (DEGENERATIVE DISC DISEASE), LUMBAR: ICD-10-CM

## 2021-10-27 DIAGNOSIS — I10 BENIGN ESSENTIAL HTN: ICD-10-CM

## 2021-10-27 DIAGNOSIS — M54.12 CERVICAL RADICULOPATHY: Primary | ICD-10-CM

## 2021-10-27 PROCEDURE — 99999 PR PBB SHADOW E&M-EST. PATIENT-LVL V: ICD-10-PCS | Mod: PBBFAC,,, | Performed by: FAMILY MEDICINE

## 2021-10-27 PROCEDURE — 99214 PR OFFICE/OUTPT VISIT, EST, LEVL IV, 30-39 MIN: ICD-10-PCS | Mod: S$PBB,,, | Performed by: FAMILY MEDICINE

## 2021-10-27 PROCEDURE — 99999 PR PBB SHADOW E&M-EST. PATIENT-LVL V: CPT | Mod: PBBFAC,,, | Performed by: FAMILY MEDICINE

## 2021-10-27 PROCEDURE — 99215 OFFICE O/P EST HI 40 MIN: CPT | Mod: PBBFAC,PO | Performed by: FAMILY MEDICINE

## 2021-10-27 PROCEDURE — 99214 OFFICE O/P EST MOD 30 MIN: CPT | Mod: S$PBB,,, | Performed by: FAMILY MEDICINE

## 2021-10-27 RX ORDER — HYDROCODONE BITARTRATE AND ACETAMINOPHEN 10; 325 MG/1; MG/1
1 TABLET ORAL 4 TIMES DAILY
Qty: 360 TABLET | Refills: 0 | Status: SHIPPED | OUTPATIENT
Start: 2021-10-27 | End: 2022-01-27 | Stop reason: SDUPTHER

## 2021-10-28 ENCOUNTER — TELEPHONE (OUTPATIENT)
Dept: PAIN MEDICINE | Facility: CLINIC | Age: 66
End: 2021-10-28
Payer: OTHER GOVERNMENT

## 2021-10-28 RX ORDER — ALPRAZOLAM 1 MG/1
1 TABLET ORAL 2 TIMES DAILY
COMMUNITY

## 2021-11-01 ENCOUNTER — OFFICE VISIT (OUTPATIENT)
Dept: URGENT CARE | Facility: CLINIC | Age: 66
End: 2021-11-01
Payer: MEDICARE

## 2021-11-01 VITALS
HEART RATE: 61 BPM | BODY MASS INDEX: 28.31 KG/M2 | OXYGEN SATURATION: 99 % | WEIGHT: 209 LBS | DIASTOLIC BLOOD PRESSURE: 72 MMHG | RESPIRATION RATE: 12 BRPM | HEIGHT: 72 IN | SYSTOLIC BLOOD PRESSURE: 133 MMHG | TEMPERATURE: 98 F

## 2021-11-01 DIAGNOSIS — R60.9 EDEMA, UNSPECIFIED TYPE: Primary | ICD-10-CM

## 2021-11-01 PROCEDURE — 99213 OFFICE O/P EST LOW 20 MIN: CPT | Mod: S$GLB,,, | Performed by: NURSE PRACTITIONER

## 2021-11-01 PROCEDURE — 99213 PR OFFICE/OUTPT VISIT, EST, LEVL III, 20-29 MIN: ICD-10-PCS | Mod: S$GLB,,, | Performed by: NURSE PRACTITIONER

## 2021-11-04 ENCOUNTER — TELEPHONE (OUTPATIENT)
Dept: URGENT CARE | Facility: CLINIC | Age: 66
End: 2021-11-04
Payer: OTHER GOVERNMENT

## 2022-01-27 ENCOUNTER — OFFICE VISIT (OUTPATIENT)
Dept: FAMILY MEDICINE | Facility: CLINIC | Age: 67
End: 2022-01-27
Payer: MEDICARE

## 2022-01-27 VITALS
TEMPERATURE: 97 F | WEIGHT: 208.69 LBS | BODY MASS INDEX: 28.27 KG/M2 | SYSTOLIC BLOOD PRESSURE: 136 MMHG | DIASTOLIC BLOOD PRESSURE: 88 MMHG | HEART RATE: 61 BPM | OXYGEN SATURATION: 97 % | RESPIRATION RATE: 16 BRPM | HEIGHT: 72 IN

## 2022-01-27 DIAGNOSIS — M51.36 DDD (DEGENERATIVE DISC DISEASE), LUMBAR: ICD-10-CM

## 2022-01-27 DIAGNOSIS — Z79.899 ENCOUNTER FOR LONG-TERM (CURRENT) USE OF MEDICATIONS: ICD-10-CM

## 2022-01-27 DIAGNOSIS — E78.49 OTHER HYPERLIPIDEMIA: ICD-10-CM

## 2022-01-27 DIAGNOSIS — I10 BENIGN ESSENTIAL HTN: ICD-10-CM

## 2022-01-27 DIAGNOSIS — M54.12 CERVICAL RADICULOPATHY: Primary | ICD-10-CM

## 2022-01-27 DIAGNOSIS — R19.00 MASS OF SOFT TISSUE OF ABDOMEN: ICD-10-CM

## 2022-01-27 PROCEDURE — 99214 OFFICE O/P EST MOD 30 MIN: CPT | Mod: S$PBB,,, | Performed by: FAMILY MEDICINE

## 2022-01-27 PROCEDURE — 99999 PR PBB SHADOW E&M-EST. PATIENT-LVL V: CPT | Mod: PBBFAC,,, | Performed by: FAMILY MEDICINE

## 2022-01-27 PROCEDURE — 99999 PR PBB SHADOW E&M-EST. PATIENT-LVL V: ICD-10-PCS | Mod: PBBFAC,,, | Performed by: FAMILY MEDICINE

## 2022-01-27 PROCEDURE — 99215 OFFICE O/P EST HI 40 MIN: CPT | Mod: PBBFAC,PO | Performed by: FAMILY MEDICINE

## 2022-01-27 PROCEDURE — 99214 PR OFFICE/OUTPT VISIT, EST, LEVL IV, 30-39 MIN: ICD-10-PCS | Mod: S$PBB,,, | Performed by: FAMILY MEDICINE

## 2022-01-27 RX ORDER — HYDROCODONE BITARTRATE AND ACETAMINOPHEN 10; 325 MG/1; MG/1
1 TABLET ORAL 4 TIMES DAILY
Qty: 360 TABLET | Refills: 0 | Status: SHIPPED | OUTPATIENT
Start: 2022-01-27 | End: 2022-04-14 | Stop reason: SDUPTHER

## 2022-01-27 RX ORDER — SIMVASTATIN 20 MG/1
20 TABLET, FILM COATED ORAL NIGHTLY
Qty: 90 TABLET | Refills: 4 | Status: CANCELLED | OUTPATIENT
Start: 2022-01-27

## 2022-01-27 RX ORDER — HYDROCHLOROTHIAZIDE 25 MG/1
25 TABLET ORAL DAILY
Qty: 90 TABLET | Refills: 4 | Status: SHIPPED | OUTPATIENT
Start: 2022-01-27 | End: 2023-03-23 | Stop reason: SDUPTHER

## 2022-01-27 NOTE — ASSESSMENT & PLAN NOTE
Follow-up with pain management for re-evaluation and to update plan with interventional pain techniques.  Previous plan:  New referral placed.  No abuse suspected.  Continue current medication regimen.  Follow-up sooner if change in condition or severe symptoms occur.

## 2022-01-27 NOTE — ASSESSMENT & PLAN NOTE
Refill hydrocodone for three months.  An opioid pain contract was completed   after having an extensive conversation about chronic opioid use for pain management. We discussed the risks and benefits of opioids.  I discouraged the patient from escalation of opioid use and try to minimize its use to decrease chance of dependence, tolerance, and opioid-based hyperalgesia.  I reviewed the  in great detail and there are no inconsistencies and it is appropriate with patient's history.  There are no signs of aberrant drug behavior.  The opioid risk tool was also completed, low risk.  Pt counseled about the side effects of long term use of opioids including dependence, tolerance, addiction, respiratory depression, somnolence, immune and endocrine dysfunction.  The patient expressed understanding.

## 2022-01-27 NOTE — PATIENT INSTRUCTIONS
Follow up in about 3 months (around 4/27/2022), or if symptoms worsen or fail to improve, for Pain med Refill.   Patient Education       Hydrochlorothiazide (yaneth cagle klor oh MANJINDER yap basilia)   Brand Names: US Microzide [DSC]   Brand Names: Reyes APO-Hydro; APO-Hydrochlorothiazide; BIO-Hydrochlorothiazide; MINT-Hydrochlorothiazide; PMS-Hydrochlorothiazide; TEVA-Hydrochlorothiazide; Urozide   What is this drug used for?   · It is used to treat high blood pressure.  · It is used to get rid of extra fluid.  · It may be given to you for other reasons. Talk with the doctor.    What do I need to tell my doctor BEFORE I take this drug?   · If you are allergic to this drug; any part of this drug; or any other drugs, foods, or substances. Tell your doctor about the allergy and what signs you had.  · If you have a sulfa allergy.  · If you are not able to pass urine.  · If you are taking dofetilide.  · If you are taking lithium.  This is not a list of all drugs or health problems that interact with this drug.  Tell your doctor and pharmacist about all of your drugs (prescription or OTC, natural products, vitamins) and health problems. You must check to make sure that it is safe for you to take this drug with all of your drugs and health problems. Do not start, stop, or change the dose of any drug without checking with your doctor.  What are some things I need to know or do while I take this drug?   · Tell all of your health care providers that you take this drug. This includes your doctors, nurses, pharmacists, and dentists.  · Avoid driving and doing other tasks or actions that call for you to be alert until you see how this drug affects you.  · To lower the chance of feeling dizzy or passing out, rise slowly if you have been sitting or lying down. Be careful going up and down stairs.  · If you have high blood sugar (diabetes), you will need to watch your blood sugar closely.  · Check your blood pressure as you have been  told.  · Tell your doctor if you have signs of high blood sugar like confusion, feeling sleepy, more thirst, more hungry, passing urine more often, flushing, fast breathing, or breath that smells like fruit.  · Have blood work checked as you have been told by the doctor. Talk with the doctor.  · This drug may affect certain lab tests. Tell all of your health care providers and lab workers that you take this drug.  · If you are taking this drug and have high blood pressure, talk with your doctor before using OTC products that may raise blood pressure. These include cough or cold drugs, diet pills, stimulants, non-steroidal anti-inflammatory drugs (NSAIDs) like ibuprofen or naproxen, and some natural products or aids.  · This drug is a strong fluid-lowering drug (diuretic). Sometimes too much water and electrolytes (like potassium) in the blood may be lost. This can lead to severe health problems. Your doctor will follow you closely to change the dose to match your body's needs.  · If you are on a low-salt or salt-free diet, talk with your doctor.  · Talk with your doctor before you use alcohol, marijuana or other forms of cannabis, or prescription or OTC drugs that may slow your actions.  · Tell your doctor if you have too much sweat, fluid loss, throwing up, or loose stools. This may lead to low blood pressure.  · If you take cholestyramine or colestipol, talk with your pharmacist about how to take them with this drug.  · Watch for gout attacks.  · If you have lupus, this drug can make your lupus active or get worse. Tell your doctor right away if you get any new or worse signs.  · This drug may make you sunburn more easily. Use care if you will be in the sun. Tell your doctor if you sunburn easily while taking this drug.  · This drug may cause high cholesterol and triglyceride levels. Talk with the doctor.  · If you are 65 or older, use this drug with care. You could have more side effects.  · Tell your doctor if  you are pregnant, plan on getting pregnant, or are breast-feeding. You will need to talk about the benefits and risks to you and the baby.    What are some side effects that I need to call my doctor about right away?   WARNING/CAUTION: Even though it may be rare, some people may have very bad and sometimes deadly side effects when taking a drug. Tell your doctor or get medical help right away if you have any of the following signs or symptoms that may be related to a very bad side effect:  · Signs of an allergic reaction, like rash; hives; itching; red, swollen, blistered, or peeling skin with or without fever; wheezing; tightness in the chest or throat; trouble breathing, swallowing, or talking; unusual hoarseness; or swelling of the mouth, face, lips, tongue, or throat.  · Signs of fluid and electrolyte problems like mood changes, confusion, muscle pain or weakness, a heartbeat that does not feel normal, very bad dizziness or passing out, fast heartbeat, more thirst, seizures, feeling very tired or weak, not hungry, unable to pass urine or change in the amount of urine produced, dry mouth, dry eyes, or very bad upset stomach or throwing up.  · Signs of a pancreas problem (pancreatitis) like very bad stomach pain, very bad back pain, or very bad upset stomach or throwing up.  · Signs of kidney problems like unable to pass urine, change in how much urine is passed, blood in the urine, or a big weight gain.  · Dark urine or yellow skin or eyes.  · Fever, chills, or sore throat; any unexplained bruising or bleeding; or feeling very tired or weak.  · A burning, numbness, or tingling feeling that is not normal.  · Shortness of breath.  · Restlessness.  · Not able to get or keep an erection.  · This drug can cause certain eye problems. If left untreated, this can lead to lasting eyesight loss. If eye problems happen, signs like change in eyesight or eye pain most often happen within hours to weeks of starting this drug.  Call your doctor right away if you have these signs.  · Rarely, certain types of skin cancer have happened in people taking hydrochlorothiazide. Protect your skin from the sun, and have your skin checked as you have been told by your doctor. Call your doctor right away if you have a change in color or size of a mole, or any new or changing skin lump or growth.  What are some other side effects of this drug?   All drugs may cause side effects. However, many people have no side effects or only have minor side effects. Call your doctor or get medical help if any of these side effects or any other side effects bother you or do not go away:  · Constipation, diarrhea, upset stomach, throwing up, or feeling less hungry.  · Stomach cramps.  · Feeling dizzy, tired, or weak.  · Headache.  These are not all of the side effects that may occur. If you have questions about side effects, call your doctor. Call your doctor for medical advice about side effects.  You may report side effects to your national health agency.  You may report side effects to the FDA at 1-775.584.8678. You may also report side effects at https://www.fda.gov/medwatch.  How is this drug best taken?   Use this drug as ordered by your doctor. Read all information given to you. Follow all instructions closely.  · Keep taking this drug as you have been told by your doctor or other health care provider, even if you feel well.  · This drug may cause you to pass urine more often. To keep from having sleep problems, try not to take too close to bedtime.  What do I do if I miss a dose?   · Take a missed dose as soon as you think about it.  · If it is close to the time for your next dose, skip the missed dose and go back to your normal time.  · Do not take 2 doses at the same time or extra doses.    How do I store and/or throw out this drug?   · Store at room temperature protected from light. Store in a dry place. Do not store in a bathroom.  · Keep lid tightly  closed.  · Keep all drugs in a safe place. Keep all drugs out of the reach of children and pets.  · Throw away unused or  drugs. Do not flush down a toilet or pour down a drain unless you are told to do so. Check with your pharmacist if you have questions about the best way to throw out drugs. There may be drug take-back programs in your area.    General drug facts   · If your symptoms or health problems do not get better or if they become worse, call your doctor.  · Do not share your drugs with others and do not take anyone else's drugs.  · Some drugs may have another patient information leaflet. If you have any questions about this drug, please talk with your doctor, nurse, pharmacist, or other health care provider.  · Some drugs may have another patient information leaflet. Check with your pharmacist. If you have any questions about this drug, please talk with your doctor, nurse, pharmacist, or other health care provider.  · If you think there has been an overdose, call your poison control center or get medical care right away. Be ready to tell or show what was taken, how much, and when it happened.    Consumer Information Use and Disclaimer   This generalized information is a limited summary of diagnosis, treatment, and/or medication information. It is not meant to be comprehensive and should be used as a tool to help the user understand and/or assess potential diagnostic and treatment options. It does NOT include all information about conditions, treatments, medications, side effects, or risks that may apply to a specific patient. It is not intended to be medical advice or a substitute for the medical advice, diagnosis, or treatment of a health care provider based on the health care provider's examination and assessment of a patient's specific and unique circumstances. Patients must speak with a health care provider for complete information about their health, medical questions, and treatment options,  including any risks or benefits regarding use of medications. This information does not endorse any treatments or medications as safe, effective, or approved for treating a specific patient. UpToDate, Inc. and its affiliates disclaim any warranty or liability relating to this information or the use thereof. The use of this information is governed by the Terms of Use, available at https://www.[employer]/en/solutions/lexicomp/about/rhett.  Last Reviewed Date   2020-05-20  Copyright   © 2021 UpToDate, Inc. and its affiliates and/or licensors. All rights reserved.      Patient Education       High Blood Pressure Discharge Instructions   About this topic   The medical name for high blood pressure is hypertension. Your blood pressure is measured with 2 numbers. For example, you may hear the staff say your blood pressure is 130 over 80. High blood pressure cannot be cured. You must control it with drugs and lifestyle changes. High blood pressure puts you at risk for heart attack, stroke, and kidney disease.         What care is needed at home?   · Ask your doctor what you need to do when you go home. Make sure you ask questions if you do not understand what the doctor says. This way you will know what you need to do.  · Take all your medicines as ordered. Do not stop taking any of your regular medicines without talking to your doctor.  · Learn how to check your blood pressure at home, if your doctor suggests you do this.  What follow-up care is needed?   Your doctor may ask you to make visits to the office to check on your progress. Be sure to keep these visits.  What drugs may be needed?   The doctor may order drugs to help control your high blood pressure. Take your drugs as ordered. Do not take other prescription drugs or over-the-counter (OTC) drugs without talking to your doctor first.  Will physical activity be limited?   Talk to your doctor about the right amount of activity for you. Exercise may help you lose  weight and lower your blood pressure.  What changes to diet are needed?   · Do not use salt on your food. Use herbs and spices to improve the taste.  · Eat less than 1,500 mg of sodium a day. Read food labels to see how much sodium is in a food.  · Limit coffee, tea, and soda to 2 cups (480 mL) a day.  · Limit beer, wine, and mixed drinks (alcohol) to 1 drink a day for women and 2 drinks a day for men.  · Eat lots of fruits, vegetables, and low-fat dairy products.  · Avoid fatty foods like fried foods or chips.  · Talk with your dietitian about the diet changes you need and the number of calories you should eat each day.  What problems could happen?   · Heart attack, heart failure, or other heart problems  · Stroke  · Swelling of blood vessels  · Kidney failure  · Loss of eyesight  · Memory problems  · Fluid in the lungs  · Death  What can be done to prevent this health problem?   · Exercise regularly. Try to get at least 30 minutes of exercise most days of the week.  · Avoid weight gain.  · Stop smoking. Your doctor can tell you about stop smoking programs.  · Learn to lower stress.  When do I need to call the doctor?   · You have signs of a heart attack, which may include:  ? Severe chest pain, pressure, or discomfort with:  § Breathing trouble, sweating, upset stomach, or cold, clammy skin  § Pain in your arms, back, or jaw  § Worse pain with activity like walking up stairs  ? Fast or irregular heartbeat  ? Feeling dizzy, faint, or weak  · You have signs of stroke like sudden:  ? Numbness or weakness of the face, arm, or leg, especially on one side of the body  ? Confusion, trouble speaking, or understanding  ? Trouble seeing in one or both eyes  ? Trouble walking, dizziness, loss of balance, or coordination  ? Severe headache with no known cause  · You have a seizure or pass out.  · You have a severe headache with an upset stomach or throwing up.  · You have sudden, severe back pain.  · You have 2 home blood  pressure readings higher than 180/120.  · Your urine is brown or bloody.  Teach Back: Helping You Understand   The Teach Back Method helps you understand the information we are giving you. After you talk with the staff, tell them in your own words what you learned. This helps to make sure the staff has described each thing clearly. It also helps to explain things that may have been confusing. Before going home, make sure you can do these:  · I can tell you about my condition.  · I can tell you what numbers are too high for my blood pressure.  · I can tell you what I will do if I have signs of a heart attack or stroke.  Where can I learn more?   American Academy of Family Physicians  https://familydoctor.org/condition/high-blood-pressure/   American Academy of Family Physicians  https://familydoctor.org/the-dash-diet-healthy-eating-to-control-your-blood-pressure/   American Heart Association  http://www.heart.org/HEARTORG/Conditions/HighBloodPressure/AboutHighBloodPressure/About-High-Blood-Pressure_UC_002050_Article.jsp#.A3uH1JbmV9o   NHS Choices  http://www.nhs.uk/conditions/blood-pressure-(high)/pages/introduction.aspx   [employer]  https://www.[employer]/contents/high-blood-pressure-in-adults-beyond-the-basics?source=see_link   Last Reviewed Date   2021-06-08  Consumer Information Use and Disclaimer   This information is not specific medical advice and does not replace information you receive from your health care provider. This is only a brief summary of general information. It does NOT include all information about conditions, illnesses, injuries, tests, procedures, treatments, therapies, discharge instructions or life-style choices that may apply to you. You must talk with your health care provider for complete information about your health and treatment options. This information should not be used to decide whether or not to accept your health care providers advice, instructions or recommendations. Only your  health care provider has the knowledge and training to provide advice that is right for you.  Copyright   Copyright © 2021 UpToDate, Inc. and its affiliates and/or licensors. All rights reserved.    Dear patient,   As a result of recent federal legislation (The Federal Cures Act), you may receive lab or pathology results from your visit in your [facility] account before your physician is able to contact you. Your physician or their representative will relay the results to you with their recommendations at their soonest availability.     If no improvement in symptoms or symptoms worsen, please be advised to call MD, follow-up at clinic and/or go to ER if becomes severe.    Eliud King M.D.        We Offer TELEHEALTH & Same Day Appointments!   Book your Telehealth appointment with me through my nurse or   Clinic appointments on [employer]!    [address] [place] [address]    Office: [phone]   FAX: [phone]    Check out my Facebook Page and Follow Me at: [email or web address]    Check out my website at [email or web address] by clicking on: [email or web address]    To Schedule appointments online, go to [employer]: [email or web address]

## 2022-01-27 NOTE — ASSESSMENT & PLAN NOTE
I recommend ultrasound soft tissue of the area in question.  [facility].  Suspicious for lipoma.  If getting larger or painful I recommend general surgery consult for evaluation and treatment with removal if needed.

## 2022-01-27 NOTE — ASSESSMENT & PLAN NOTE
Update labs.  Continue statin.Counseled on hyperlipidemia disease course, healthy diet and increased need for exercise.  Please be advised of the risk of cardiovascular disease, increase stroke and heart attack risk with uncontrolled/untreated hyperlipidemia.     Patient voiced understanding and understood the treatment plan. All questions were answered.

## 2022-01-27 NOTE — ASSESSMENT & PLAN NOTE
Increase hydrochlorothiazide 25 milligrams.  Stay off amlodipine due to swelling.  Continue current medication regimen.  [facility].Counseled on importance of hypertension disease course, I recommend ongoing Education for DASH-diet and exercise.  Counseled on medication regimen importance of treating high blood pressure.  Please be advised of risk of untreated blood pressure as discussed.  Please advised of ER precautions were given for symptoms of hypertensive urgency and emergency.

## 2022-01-27 NOTE — ASSESSMENT & PLAN NOTE
Patient states that he will [facility] at [facility] visit.  Complete history and physical was completed today.  Complete and thorough medication reconciliation was performed.  Discussed risks and benefits of medications.  Advised patient on orders and health maintenance.  We discussed old records and old labs if available.  Will request any records not available through epic.  Continue current medications listed on your summary sheet.

## 2022-01-27 NOTE — PROGRESS NOTES
PLAN:      Problem List Items Addressed This Visit     Cervical radiculopathy - Primary (Chronic)     Follow-up with pain management for re-evaluation and to update plan with interventional pain techniques.  Previous plan:  New referral placed.  No abuse suspected.  Continue current medication regimen.  Follow-up sooner if change in condition or severe symptoms occur.         Relevant Medications    HYDROcodone-acetaminophen (NORCO)  mg per tablet    DDD (degenerative disc disease), lumbar (Chronic)     Refill hydrocodone for three months.  An opioid pain contract was completed   after having an extensive conversation about chronic opioid use for pain management. We discussed the risks and benefits of opioids.  I discouraged the patient from escalation of opioid use and try to minimize its use to decrease chance of dependence, tolerance, and opioid-based hyperalgesia.  I reviewed the  in great detail and there are no inconsistencies and it is appropriate with patient's history.  There are no signs of aberrant drug behavior.  The opioid risk tool was also completed, low risk.  Pt counseled about the side effects of long term use of opioids including dependence, tolerance, addiction, respiratory depression, somnolence, immune and endocrine dysfunction.  The patient expressed understanding.         Relevant Medications    HYDROcodone-acetaminophen (NORCO)  mg per tablet    Benign essential HTN (Chronic)     Increase hydrochlorothiazide 25 milligrams.  Stay off amlodipine due to swelling.  Continue current medication regimen.  [facility]seled on importance of hypertension disease course, I recommend ongoing Education for DASH-diet and exercise.  Counseled on medication regimen importance of treating high blood pressure.  Please be advised of risk of untreated blood pressure as discussed.  Please advised of ER precautions were given for symptoms of hypertensive urgency and  emergency.           Relevant Medications    hydroCHLOROthiazide (HYDRODIURIL) 25 MG tablet    Other hyperlipidemia (Chronic)     Update labs.  Continue statin.Counseled on hyperlipidemia disease course, healthy diet and increased need for exercise.  Please be advised of the risk of cardiovascular disease, increase stroke and heart attack risk with uncontrolled/untreated hyperlipidemia.     Patient voiced understanding and understood the treatment plan. All questions were answered.              Encounter for long-term (current) use of medications (Chronic)     Patient states that he [facility].  Complete history and physical was completed today.  Complete and thorough medication reconciliation was performed.  Discussed risks and benefits of medications.  Advised patient on orders and health maintenance.  We discussed old records and old labs if available.  Will request any records not available through epic.  Continue current medications listed on your summary sheet.           Relevant Medications    HYDROcodone-acetaminophen (NORCO)  mg per tablet    hydroCHLOROthiazide (HYDRODIURIL) 25 MG tablet    Mass of soft tissue of abdomen     I recommend ultrasound soft tissue of the area in question.  [facility].  Suspicious for lipoma.  If getting larger or painful I recommend general surgery consult for evaluation and treatment with removal if needed.             Future Appointments     Date Provider Specialty Appt Notes    4/14/2022 Eliud King MD Family Medicine 3 month f/u refill pain med         Medication Management for assessment above:   Medication List with Changes/Refills   Current Medications    ALBUTEROL 90 MCG/ACTUATION INHALER    Inhale 2 puffs into the lungs every 6 (six) hours as needed for Wheezing.    ALPRAZOLAM (XANAX) 1 MG TABLET    Take 1 mg by mouth 2 (two) times daily. [facility]    ASPIRIN  81 MG CHEW    Take 1 tablet (81 mg total) by mouth once daily.    ATENOLOL (TENORMIN) 25 MG TABLET    Take 0.5 tablets (12.5 mg total) by mouth once daily.    DILTIAZEM (DILACOR XR) 180 MG CDCR    Take 1 capsule (180 mg total) by mouth 2 (two) times daily.    GABAPENTIN (NEURONTIN) 300 MG CAPSULE    Take 2 capsules (600 mg total) by mouth 3 (three) times daily.    LORATADINE (CLARITIN) 10 MG TABLET    Take 1 tablet (10 mg total) by mouth once daily.    LUBRICANT EYE, POLYV ALCOHOL, 1.4 % OPHTHALMIC SOLUTION        MUPIROCIN (BACTROBAN) 2 % OINTMENT    Apply topically 3 (three) times daily.    POTASSIUM CHLORIDE (KLOR-CON) 10 MEQ TBSR    Take 2 tablets (20 mEq total) by mouth 2 (two) times daily.    SIMVASTATIN (ZOCOR) 20 MG TABLET    Take 1 tablet (20 mg total) by mouth every evening.    SYMBICORT 80-4.5 MCG/ACTUATION HFAA    2 puffs daily as needed.     TRAVOPROST, BENZALKONIUM, (TRAVATAN) 0.004 % OPHTHALMIC SOLUTION    Place 1 drop into both eyes nightly.   Changed and/or Refilled Medications    Modified Medication Previous Medication    HYDROCHLOROTHIAZIDE (HYDRODIURIL) 25 MG TABLET hydrochlorothiazide (HYDRODIURIL) 12.5 MG Tab       Take 1 tablet (25 mg total) by mouth once daily.    Take 1 tablet (12.5 mg total) by mouth once daily.    HYDROCODONE-ACETAMINOPHEN (NORCO)  MG PER TABLET HYDROcodone-acetaminophen (NORCO)  mg per tablet       Take 1 tablet by mouth 4 (four) times daily.    Take 1 tablet by mouth 4 (four) times daily.   Discontinued Medications    AMLODIPINE (NORVASC) 10 MG TABLET    Take 1 tablet (10 mg total) by mouth once daily.       Eliud King M.D.  ==========================================================================  Subjective:   Patient ID: Migue Sellers is a 66 y.o. male.  has a past medical history of Anticoagulant long-term use, Asthma, Cervical stenosis of spine, DDD (degenerative disc disease), lumbar, Depression, DJD (degenerative joint disease), colonic polyps  (06/08/2015), Hyperlipidemia, Hypertension, Intervertebral disc protrusion, Seasonal allergies, and Skin abnormalities.   Chief Complaint: Follow-up and Medication Refill      Problem List Items Addressed This Visit     Cervical radiculopathy - Primary (Chronic)    Overview     Chronic.  Intermittent control.  Patient follows with Pain MGMT Dr. Phil Ann .  He has received cervical SOLIS in the past with some relief.  On chronic hydrocodone.  Reports compliance.  No side effects reported.  Symptoms are controlled.      current HPI:  No change in HPI         Current Assessment & Plan     Follow-up with pain management for re-evaluation and to update plan with interventional pain techniques.  Previous plan:  New referral placed.  No abuse suspected.  Continue current medication regimen.  Follow-up sooner if change in condition or severe symptoms occur.         DDD (degenerative disc disease), lumbar (Chronic)    Overview     fu chronic pain management generally tolerating with current HC 10 qid    Hx cervical stenosis and lumbar disc disease w chonic pain and radiculopathy. 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  HBP and HLP controlled   Recent mRI showed worsening cervical DDD C1-T2 . He is also in a new surveillance from [employer] re toxic exposure [place]  January 2022:  No change in HPI.         Current Assessment & Plan     Refill hydrocodone for three months.  An opioid pain contract was completed   after having an extensive conversation about chronic opioid use for pain management. We discussed the risks and benefits of opioids.  I discouraged the patient from escalation of opioid use and try to minimize its use to decrease chance of dependence, tolerance, and opioid-based hyperalgesia.  I reviewed the  in great detail and there are no inconsistencies and it is appropriate with patient's history.  There are no signs of aberrant drug behavior.  The opioid risk tool was also completed,  low risk.  Pt counseled about the side effects of long term use of opioids including dependence, tolerance, addiction, respiratory depression, somnolence, immune and endocrine dysfunction.  The patient expressed understanding.         Benign essential HTN (Chronic)    Overview     CHRONIC. STABLE. BP Reviewed.  Compliant with BP medications. No SE reported.  [facility]  (-) CP, SOB, palpitations, dizziness, lightheadedness, HA, arm numbness, tingling or weakness, syncope.  Creatinine   Date Value Ref Range Status   11/19/2014 1.3 0.5 - 1.4 mg/dL Final     January 2022:  Blood pressure slightly elevated today.  Patient had lower extremity edema on amlodipine which resolved with stopping the medication.  However patient blood pressure has been high recently.         Current Assessment & Plan     Increase hydrochlorothiazide 25 milligrams.  Stay off amlodipine due to swelling.  Continue current medication regimen.  [facility].Counseled on importance of hypertension disease course, I recommend ongoing Education for DASH-diet and exercise.  Counseled on medication regimen importance of treating high blood pressure.  Please be advised of risk of untreated blood pressure as discussed.  Please advised of ER precautions were given for symptoms of hypertensive urgency and emergency.           Other hyperlipidemia (Chronic)    Overview     CHRONIC. STABLE. Lab analysis reviewed.  [facility]  (-) CP, SOB, abdominal pain, N/V/D, constipation, jaundice, skin changes.  (-) Myalgias  Lab Results   Component Value Date    CHOL 177 04/26/2018    CHOL 199 11/19/2014     Lab Results   Component Value Date    HDL 36 (L) 04/26/2018    HDL 41 11/19/2014     Lab Results   Component Value Date    LDLCALC 121.4 04/26/2018    LDLCALC 135.8 11/19/2014     Lab Results   Component Value Date    TRIG 98 04/26/2018    TRIG 111 11/19/2014     Lab Results   Component Value Date    CHOLHDL  20.3 04/26/2018    CHOLHDL 20.6 11/19/2014     Lab Results   Component Value Date    TOTALCHOLEST 4.9 04/26/2018    TOTALCHOLEST 4.9 11/19/2014     Lab Results   Component Value Date    ALT 21 11/19/2014    AST 25 11/19/2014    ALKPHOS 95 11/19/2014    BILITOT 0.9 11/19/2014              Current Assessment & Plan     Update labs.  Continue statin.Counseled on hyperlipidemia disease course, healthy diet and increased need for exercise.  Please be advised of the risk of cardiovascular disease, increase stroke and heart attack risk with uncontrolled/untreated hyperlipidemia.     Patient voiced understanding and understood the treatment plan. All questions were answered.              Encounter for long-term (current) use of medications (Chronic)    Overview     CHRONIC. Stable. Compliant with medications for managed conditions. See medication list. No SE reported.   Routine lab analysis is being monitored. Refills were addressed.  Lab Results   Component Value Date    WBC 4.64 11/19/2014    HGB 17.0 11/19/2014    HCT 48.5 11/19/2014    MCV 87 11/19/2014     11/19/2014         Chemistry        Component Value Date/Time     11/19/2014 1519    K 4.0 11/19/2014 1519     11/19/2014 1519    CO2 27 11/19/2014 1519    BUN 13 11/19/2014 1519    CREATININE 1.3 11/19/2014 1519    GLU 96 11/19/2014 1519        Component Value Date/Time    CALCIUM 9.9 11/19/2014 1519    ALKPHOS 95 11/19/2014 1519    AST 25 11/19/2014 1519    ALT 21 11/19/2014 1519    BILITOT 0.9 11/19/2014 1519    ESTGFRAFRICA >60.0 11/19/2014 1519    EGFRNONAA 58.9 (A) 11/19/2014 1519          Lab Results   Component Value Date    TSH 1.012 11/19/2014              Current Assessment & Plan     Patient states that [facility] will [facility].  Complete history and physical was completed today.  Complete and thorough medication reconciliation was performed.  Discussed risks and benefits of medications.  Advised patient on  orders and health maintenance.  We discussed old records and old labs if available.  Will request any records not available through epic.  Continue current medications listed on your summary sheet.           Mass of soft tissue of abdomen    Overview     New problem.  [facility].  No imaging study was done on the area in question.  Patient has a soft tissue mass in the left lower abdomen/hip area.  He denies any pain but the area is fairly new to him.         Current Assessment & Plan     I recommend ultrasound soft tissue of the area in question.  [facility].  Suspicious for lipoma.  If getting larger or painful I recommend general surgery consult for evaluation and treatment with removal if needed.                Review of patient's allergies indicates:   Allergen Reactions    Amlodipine Swelling    Ace inhibitors Swelling     Current Outpatient Medications   Medication Instructions    albuterol 90 mcg/actuation inhaler 2 puffs, Inhalation, Every 6 hours PRN    ALPRAZolam (XANAX) 1 mg, Oral, 2 times daily, [facility]     aspirin 81 mg, Oral, Daily    atenoloL (TENORMIN) 12.5 mg, Oral, Daily    diltiaZEM (DILACOR XR) 180 mg, Oral, 2 times daily    gabapentin (NEURONTIN) 600 mg, Oral, 3 times daily    hydroCHLOROthiazide (HYDRODIURIL) 25 mg, Oral, Daily    HYDROcodone-acetaminophen (NORCO)  mg per tablet 1 tablet, Oral, 4 times daily    loratadine (CLARITIN) 10 mg, Oral, Daily    LUBRICANT EYE, POLYV ALCOHOL, 1.4 % ophthalmic solution No dose, route, or frequency recorded.    mupirocin (BACTROBAN) 2 % ointment Topical (Top), 3 times daily    potassium chloride (KLOR-CON) 10 MEQ TbSR 20 mEq, Oral, 2 times daily    simvastatin (ZOCOR) 20 mg, Oral, Nightly    SYMBICORT 80-4.5 mcg/actuation HFAA 2 puffs, Daily PRN    travoprost, benzalkonium, (TRAVATAN) 0.004 % ophthalmic solution 1 drop, Both  Eyes, Nightly      I have reviewed the PMH, social history, FamilyHx, surgical history, allergies and medications documented / confirmed by the patient at the time of this visit.  Review of Systems   Constitutional: Negative for chills, fatigue, fever and unexpected weight change.   HENT: Negative for ear pain and sore throat.    Eyes: Negative for redness and visual disturbance.   Respiratory: Negative for cough and shortness of breath.    Cardiovascular: Negative for chest pain and palpitations.   Gastrointestinal: Negative for nausea and vomiting.   Endocrine: Negative for cold intolerance and heat intolerance.   Genitourinary: Negative for difficulty urinating and hematuria.   Musculoskeletal: Positive for arthralgias, back pain and gait problem. Negative for myalgias.   Skin: Negative for rash and wound.   Allergic/Immunologic: Negative for environmental allergies and food allergies.   Neurological: Negative for weakness and headaches.   Hematological: Negative for adenopathy. Does not bruise/bleed easily.   Psychiatric/Behavioral: Negative for sleep disturbance. The patient is not nervous/anxious.      Objective:   /88   Pulse 61   Temp 97.3 °F (36.3 °C) (Temporal)   Resp 16   Ht 6' (1.829 m)   Wt 94.7 kg (208 lb 11.2 oz)   SpO2 97%   BMI 28.30 kg/m²   Physical Exam  Vitals and nursing note reviewed.   Constitutional:       General: He is not in acute distress.     Appearance: He is well-developed.      Comments: Walks with a cane   HENT:      Head: Normocephalic and atraumatic.      Right Ear: External ear normal.      Left Ear: External ear normal.      Nose: Nose normal. No rhinorrhea.   Eyes:      Extraocular Movements: Extraocular movements intact.      Pupils: Pupils are equal, round, and reactive to light.   Cardiovascular:      Rate and Rhythm: Normal rate.      Pulses: Normal pulses.   Pulmonary:      Effort: Pulmonary effort is normal. No respiratory distress.      Breath sounds: Normal  breath sounds.   Abdominal:      General: Bowel sounds are normal.      Palpations: Abdomen is soft.          Comments: Soft tissue mass, soft, nonmobile nontender,   Musculoskeletal:         General: Tenderness and deformity present. Normal range of motion.      Cervical back: Normal range of motion and neck supple.   Skin:     General: Skin is warm and dry.      Capillary Refill: Capillary refill takes less than 2 seconds.   Neurological:      General: No focal deficit present.      Mental Status: He is alert and oriented to person, place, and time.   Psychiatric:         Mood and Affect: Mood normal.         Behavior: Behavior normal.     Wearing knee brace on the left leg.   Patient is wearing a mask which limits physical exam due to COVID restrictions.   Assessment:     1. Cervical radiculopathy    2. DDD (degenerative disc disease), lumbar    3. Benign essential HTN    4. Encounter for long-term (current) use of medications    5. Other hyperlipidemia    6. Mass of soft tissue of abdomen      MDM:   Moderate complexity.  Moderate risk.  Total time: 31 minutes.  This includes total time spent on the encounter, which includes face to face time and non-face to face time preparing to see the patient (eg, review of previous medical records, tests), Obtaining and/or reviewing separately obtained history, documenting clinical information in the electronic or other health record, independently interpreting results (not separately reported)/communicating results to the patient/family/caregiver, and/or care coordination (not separately reported).    I have Reviewed and summarized old records.  I have performed thorough medication reconciliation today and discussed risk and benefits of medications.  I have reviewed labs and discussed with patient.  All questions were answered.  I am requesting old records and will review them once they are available. [facility]    I have signed for the following orders AND/OR meds.  No orders of  the defined types were placed in this encounter.    Medications Ordered This Encounter   Medications    hydroCHLOROthiazide (HYDRODIURIL) 25 MG tablet     Sig: Take 1 tablet (25 mg total) by mouth once daily.     Dispense:  90 tablet     Refill:  4     .    HYDROcodone-acetaminophen (NORCO)  mg per tablet     Sig: Take 1 tablet by mouth 4 (four) times daily.     Dispense:  360 tablet     Refill:  0     Quantity prescribed more than 7 day supply? Yes, quantity medically necessary        Follow up in about 3 months (around 4/27/2022), or if symptoms worsen or fail to improve, for Pain med Refill.    If no improvement in symptoms or symptoms worsen, advised to call/follow-up at clinic or go to ER. Patient voiced understanding and all questions/concerns were addressed.   DISCLAIMER: This note was compiled by using a speech recognition dictation system and therefore please be aware that typographical / speech recognition errors can and do occur.  Please contact me if you see any errors specifically.    Eliud King M.D.       Office: [phone] [address] [place] [address]  FAX: [phone]

## 2022-02-10 DIAGNOSIS — I10 BENIGN ESSENTIAL HTN: ICD-10-CM

## 2022-02-28 LAB
HBA1C MFR BLD: 7.4 % (ref 4.2–5.8)
PSA: 1.2 NG/ML

## 2022-04-14 ENCOUNTER — OFFICE VISIT (OUTPATIENT)
Dept: FAMILY MEDICINE | Facility: CLINIC | Age: 67
End: 2022-04-14
Payer: MEDICARE

## 2022-04-14 VITALS
RESPIRATION RATE: 16 BRPM | BODY MASS INDEX: 28.35 KG/M2 | HEIGHT: 72 IN | TEMPERATURE: 98 F | DIASTOLIC BLOOD PRESSURE: 86 MMHG | HEART RATE: 62 BPM | WEIGHT: 209.31 LBS | OXYGEN SATURATION: 98 % | SYSTOLIC BLOOD PRESSURE: 138 MMHG

## 2022-04-14 DIAGNOSIS — Z79.899 ENCOUNTER FOR LONG-TERM (CURRENT) USE OF MEDICATIONS: ICD-10-CM

## 2022-04-14 DIAGNOSIS — E11.65 TYPE 2 DIABETES MELLITUS WITH HYPERGLYCEMIA, WITHOUT LONG-TERM CURRENT USE OF INSULIN: ICD-10-CM

## 2022-04-14 DIAGNOSIS — M54.12 CERVICAL RADICULOPATHY: Primary | ICD-10-CM

## 2022-04-14 DIAGNOSIS — M51.36 DDD (DEGENERATIVE DISC DISEASE), LUMBAR: ICD-10-CM

## 2022-04-14 PROCEDURE — 99215 PR OFFICE/OUTPT VISIT, EST, LEVL V, 40-54 MIN: ICD-10-PCS | Mod: S$PBB,,, | Performed by: FAMILY MEDICINE

## 2022-04-14 PROCEDURE — 99999 PR PBB SHADOW E&M-EST. PATIENT-LVL V: CPT | Mod: PBBFAC,,, | Performed by: FAMILY MEDICINE

## 2022-04-14 PROCEDURE — 99999 PR PBB SHADOW E&M-EST. PATIENT-LVL V: ICD-10-PCS | Mod: PBBFAC,,, | Performed by: FAMILY MEDICINE

## 2022-04-14 PROCEDURE — 99215 OFFICE O/P EST HI 40 MIN: CPT | Mod: PBBFAC,PO | Performed by: FAMILY MEDICINE

## 2022-04-14 PROCEDURE — 99215 OFFICE O/P EST HI 40 MIN: CPT | Mod: S$PBB,,, | Performed by: FAMILY MEDICINE

## 2022-04-14 RX ORDER — HYDROCODONE BITARTRATE AND ACETAMINOPHEN 10; 325 MG/1; MG/1
1 TABLET ORAL 4 TIMES DAILY
Qty: 360 TABLET | Refills: 0 | Status: SHIPPED | OUTPATIENT
Start: 2022-04-14 | End: 2022-06-28 | Stop reason: SDUPTHER

## 2022-04-14 NOTE — ASSESSMENT & PLAN NOTE
New diagnosis for the patient.  Patient has been drinking a lot of sodas lately.  I recommend lifestyle modification with diet and exercise.  Low-carbohydrate diet.We will plan to monitor hemoglobin A1c at designated intervals 3 to 6 months.  I recommend ongoing Education for diabetic diet and exercise protocol.  We will continue to monitor for side effects.    Please be advised of symptoms to monitor for and to notify me immediately if persistent or worsening.  Follow up with Ophthalmology/Optometry and Podiatry at least annually.  If no improvement in three months will consider starting medication.

## 2022-04-14 NOTE — PATIENT INSTRUCTIONS
Follow up in about 3 months (around 7/14/2022), or if symptoms worsen or fail to improve, for pain med refill.     Dear patient,   As a result of recent federal legislation (The Federal Cures Act), you may receive lab or pathology results from your visit in your [facility] account before your physician is able to contact you. Your physician or their representative will relay the results to you with their recommendations at their soonest availability.     If no improvement in symptoms or symptoms worsen, please be advised to call MD, follow-up at clinic and/or go to ER if becomes severe.    Eliud King M.D.        We Offer TELEHEALTH & Same Day Appointments!   Book your Telehealth appointment with me through my nurse or   Clinic appointments on [employer]!    [address] [place] [address]    Office: [phone]   FAX: [phone]    Check out my Facebook Page and Follow Me at: [email or web address]    Check out my website at [employer] by clicking on: [email or web address]    To Schedule appointments online, go to [employer]: [email or web address]
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
## 2022-04-29 ENCOUNTER — PATIENT OUTREACH (OUTPATIENT)
Dept: ADMINISTRATIVE | Facility: HOSPITAL | Age: 67
End: 2022-04-29
Payer: MEDICARE

## 2022-05-13 ENCOUNTER — PATIENT MESSAGE (OUTPATIENT)
Dept: SMOKING CESSATION | Facility: CLINIC | Age: 67
End: 2022-05-13
Payer: MEDICARE

## 2022-05-17 ENCOUNTER — PES CALL (OUTPATIENT)
Dept: ADMINISTRATIVE | Facility: CLINIC | Age: 67
End: 2022-05-17
Payer: MEDICARE

## 2022-05-24 ENCOUNTER — TELEPHONE (OUTPATIENT)
Dept: ADMINISTRATIVE | Facility: CLINIC | Age: 67
End: 2022-05-24
Payer: MEDICARE

## 2022-05-25 ENCOUNTER — OFFICE VISIT (OUTPATIENT)
Dept: FAMILY MEDICINE | Facility: CLINIC | Age: 67
End: 2022-05-25
Payer: MEDICARE

## 2022-05-25 VITALS
DIASTOLIC BLOOD PRESSURE: 74 MMHG | HEIGHT: 72 IN | HEART RATE: 62 BPM | TEMPERATURE: 98 F | SYSTOLIC BLOOD PRESSURE: 130 MMHG | BODY MASS INDEX: 27.9 KG/M2 | WEIGHT: 206 LBS

## 2022-05-25 DIAGNOSIS — M54.12 CERVICAL RADICULOPATHY: Chronic | ICD-10-CM

## 2022-05-25 DIAGNOSIS — Z00.00 ENCOUNTER FOR PREVENTIVE CARE: Primary | ICD-10-CM

## 2022-05-25 DIAGNOSIS — M54.16 LUMBAR RADICULOPATHY: ICD-10-CM

## 2022-05-25 DIAGNOSIS — I15.2 HYPERTENSION ASSOCIATED WITH DIABETES: ICD-10-CM

## 2022-05-25 DIAGNOSIS — E11.59 HYPERTENSION ASSOCIATED WITH DIABETES: ICD-10-CM

## 2022-05-25 DIAGNOSIS — E11.69 HYPERLIPIDEMIA ASSOCIATED WITH TYPE 2 DIABETES MELLITUS: ICD-10-CM

## 2022-05-25 DIAGNOSIS — E11.65 TYPE 2 DIABETES MELLITUS WITH HYPERGLYCEMIA, WITHOUT LONG-TERM CURRENT USE OF INSULIN: Chronic | ICD-10-CM

## 2022-05-25 DIAGNOSIS — Z86.010 HISTORY OF COLON POLYPS: ICD-10-CM

## 2022-05-25 DIAGNOSIS — E78.5 HYPERLIPIDEMIA ASSOCIATED WITH TYPE 2 DIABETES MELLITUS: ICD-10-CM

## 2022-05-25 DIAGNOSIS — M51.36 DDD (DEGENERATIVE DISC DISEASE), LUMBAR: Chronic | ICD-10-CM

## 2022-05-25 DIAGNOSIS — Z79.899 ENCOUNTER FOR LONG-TERM (CURRENT) USE OF MEDICATIONS: Chronic | ICD-10-CM

## 2022-05-25 PROCEDURE — G0439 PPPS, SUBSEQ VISIT: HCPCS | Mod: ,,, | Performed by: NURSE PRACTITIONER

## 2022-05-25 PROCEDURE — 99214 OFFICE O/P EST MOD 30 MIN: CPT | Mod: PBBFAC,PO | Performed by: NURSE PRACTITIONER

## 2022-05-25 PROCEDURE — G0439 PR MEDICARE ANNUAL WELLNESS SUBSEQUENT VISIT: ICD-10-PCS | Mod: ,,, | Performed by: NURSE PRACTITIONER

## 2022-05-25 PROCEDURE — 99999 PR PBB SHADOW E&M-EST. PATIENT-LVL IV: CPT | Mod: PBBFAC,,, | Performed by: NURSE PRACTITIONER

## 2022-05-25 PROCEDURE — 99999 PR PBB SHADOW E&M-EST. PATIENT-LVL IV: ICD-10-PCS | Mod: PBBFAC,,, | Performed by: NURSE PRACTITIONER

## 2022-05-25 NOTE — PROGRESS NOTES
Migue Sellers presented for a follow-up Medicare AWV today. The following components were reviewed and updated:    · Medical history  · Family History  · Social history  · Allergies and Current Medications  · Health Risk Assessment  · Health Maintenance  · Care Team    **See Completed Assessments for Annual Wellness visit with in the encounter summary    The following assessments were completed:  · Depression Screening  · Cognitive function Screening  · Timed Get Up Test  · Whisper Test  · PHQ-2  · Nutrition screen  · ADL  · PAQ  · Osteoporosis risk  · CAGE  · Living situation      Vitals:    05/25/22 1311   BP: 130/74   Pulse: 62   Temp: 97.5 °F (36.4 °C)   TempSrc: Temporal   Weight: 93.4 kg (206 lb)   Height: 6' (1.829 m)     Body mass index is 27.94 kg/m².   ]        Diagnoses and health risks identified today and associated recommendations/orders:  1. Encounter for preventive care  Stable  Up to date    2. Type 2 diabetes mellitus with hyperglycemia, without long-term current use of insulin  Unstable  Managed by PCP  Last Hgb A1C on 2/2022 was 7.4  Continue current medications, plan of care  F/U with PCP as advised    3. Hypertension associated with diabetes  Stable  Managed by PCP  Low sodium diet recommended  Continue current medications, plan of care  F/U with PCP as advised      4. Hyperlipidemia associated with type 2 diabetes mellitus  Stable  Managed by PCP  Low sodium diet recommended  Continue current medications, plan of care  F/U with PCP as advised    5. Cervical radiculopathy  Stable  Managed by pain management  Continue current medications, plan of care  F/U with PCP as advised    6. DDD (degenerative disc disease), lumbar  Stable  Managed by pain management  Continue current medications, plan of care  F/U with PCP as advised    7. Lumbar radiculopathy  Stable  Managed by pain management  Continue current medications, plan of care  F/U with PCP as advised    8. History of colon polyps  Colonoscopy  "due; pt states scheduled  Colonoscopy per colon cancer screening guidelines recommended    9. Encounter for long-term (current) use of medications  Stable  Continue current medications, plan of care    Pt declines to schedule any vaccines, radiology studies or labs noted in care gaps; states, "I've had all of that."     I offered to discuss end of life issues, including information on how to make advance directives that the patient could use to name someone who would make medical decisions on their behalf if they became too ill to make themselves.    _x__Patient declined - already done.    ___Patient is interested, I provided paperwork and offered to discuss  Provided Don with a 5-10 year written screening schedule and personal prevention plan. Recommendations were developed using the USPSTF age appropriate recommendations. Education, counseling, and referrals were provided as needed.  After Visit Summary printed and given to patient which includes a list of additional screenings\tests needed.    No follow-ups on file.      Vicenta Kamara NP  "

## 2022-05-31 ENCOUNTER — PATIENT MESSAGE (OUTPATIENT)
Dept: FAMILY MEDICINE | Facility: CLINIC | Age: 67
End: 2022-05-31
Payer: MEDICARE

## 2022-06-01 DIAGNOSIS — E11.9 TYPE 2 DIABETES MELLITUS WITHOUT COMPLICATION: ICD-10-CM

## 2022-06-23 LAB
ALBUMIN SERPL BCP-MCNC: 4.3 G/DL (ref 3.5–5)
ALP SERPL-CCNC: 72 U/L (ref 38–126)
ALT SERPL W P-5'-P-CCNC: 20 U/L (ref 12–63)
AST SERPL-CCNC: 21 U/L (ref 15–41)
BILIRUB SERPL-MCNC: 0.7 MG/DL (ref 0.1–1.3)
BUN SERPL-MCNC: 13 MG/DL (ref 7–20)
CALCIUM SERPL-MCNC: 9.4 MG/DL (ref 8.9–10.3)
CHLORIDE SERPL-SCNC: 101 MMOL/L (ref 101–111)
CHOLEST SERPL-MSCNC: 119 MG/DL (ref 0–200)
CO2 SERPL-SCNC: 28 MMOL/L (ref 22–32)
CREAT SERPL-MCNC: 1.3 MG/DL (ref 0.6–1.3)
GFR: 60
GLUCOSE SERPL-MCNC: 124 MG/DL (ref 70–110)
HBA1C MFR BLD: 6.8 % (ref 4.2–5.8)
HDLC SERPL-MCNC: 35 MG/DL
LDLC SERPL CALC-MCNC: 68 MG/DL (ref ?–100)
MICROALB/CREAT RATIO: 24.4 MG/G (ref 0–30)
MICROALBUMIN URINE: 2.1 MG/DL
POTASSIUM SERPL-SCNC: 4.3 MMOL/L (ref 3.6–5.1)
PROT SERPL-MCNC: 7.7 G/DL (ref 6.7–8.5)
SODIUM BLD-SCNC: 136 MMOL/L (ref 136–144)
TRIGL SERPL-MCNC: 84 MG/DL (ref ?–200)

## 2022-06-28 ENCOUNTER — OFFICE VISIT (OUTPATIENT)
Dept: FAMILY MEDICINE | Facility: CLINIC | Age: 67
End: 2022-06-28
Payer: MEDICARE

## 2022-06-28 VITALS
SYSTOLIC BLOOD PRESSURE: 138 MMHG | RESPIRATION RATE: 16 BRPM | HEART RATE: 62 BPM | DIASTOLIC BLOOD PRESSURE: 88 MMHG | OXYGEN SATURATION: 95 % | HEIGHT: 72 IN | TEMPERATURE: 98 F | BODY MASS INDEX: 29.54 KG/M2 | WEIGHT: 218.13 LBS

## 2022-06-28 DIAGNOSIS — M51.36 DDD (DEGENERATIVE DISC DISEASE), LUMBAR: ICD-10-CM

## 2022-06-28 DIAGNOSIS — J30.89 PERENNIAL ALLERGIC RHINITIS: ICD-10-CM

## 2022-06-28 DIAGNOSIS — E11.65 TYPE 2 DIABETES MELLITUS WITH HYPERGLYCEMIA, WITHOUT LONG-TERM CURRENT USE OF INSULIN: Primary | ICD-10-CM

## 2022-06-28 DIAGNOSIS — M54.12 CERVICAL RADICULOPATHY: ICD-10-CM

## 2022-06-28 DIAGNOSIS — Z79.899 ENCOUNTER FOR LONG-TERM (CURRENT) USE OF MEDICATIONS: ICD-10-CM

## 2022-06-28 PROCEDURE — 99999 PR PBB SHADOW E&M-EST. PATIENT-LVL V: CPT | Mod: PBBFAC,,, | Performed by: FAMILY MEDICINE

## 2022-06-28 PROCEDURE — 99214 OFFICE O/P EST MOD 30 MIN: CPT | Mod: S$PBB,,, | Performed by: FAMILY MEDICINE

## 2022-06-28 PROCEDURE — 99214 PR OFFICE/OUTPT VISIT, EST, LEVL IV, 30-39 MIN: ICD-10-PCS | Mod: S$PBB,,, | Performed by: FAMILY MEDICINE

## 2022-06-28 PROCEDURE — 99999 PR PBB SHADOW E&M-EST. PATIENT-LVL V: ICD-10-PCS | Mod: PBBFAC,,, | Performed by: FAMILY MEDICINE

## 2022-06-28 PROCEDURE — 99215 OFFICE O/P EST HI 40 MIN: CPT | Mod: PBBFAC,PO | Performed by: FAMILY MEDICINE

## 2022-06-28 RX ORDER — HYDROCODONE BITARTRATE AND ACETAMINOPHEN 10; 325 MG/1; MG/1
1 TABLET ORAL 4 TIMES DAILY
Qty: 360 TABLET | Refills: 0 | Status: SHIPPED | OUTPATIENT
Start: 2022-06-28 | End: 2022-09-27 | Stop reason: SDUPTHER

## 2022-06-28 RX ORDER — MONTELUKAST SODIUM 10 MG/1
10 TABLET ORAL NIGHTLY
Qty: 90 TABLET | Refills: 0 | Status: SHIPPED | OUTPATIENT
Start: 2022-06-28 | End: 2022-09-26

## 2022-06-28 RX ORDER — ALOGLIPTIN 25 MG/1
25 TABLET, FILM COATED ORAL DAILY
Qty: 90 TABLET | Refills: 4
Start: 2022-06-28 | End: 2022-12-20 | Stop reason: SDUPTHER

## 2022-06-28 NOTE — ASSESSMENT & PLAN NOTE
Complete history and physical was completed today.  Complete and thorough medication reconciliation was performed.  Discussed risks and benefits of medications.  Advised patient on orders and health maintenance.  We discussed old records and old labs if available.  Will request any records not available through epic.  Continue current medications listed on your summary sheet.     Patient's control is improving however, he remains above goal   At this time, recommend meeting with registered dietitian for medical nutrition therapy  Recommend maximizing metformin  Increase Xigduo to  mg once daily  Patient is to take this for 1 week and then add in 1000 mg of metformin with breakfast   Continue Rybelsus  Continue to focus on healthy diet  Increase physical activity as tolerated  Check labs in 4 months prior to next appointment  Patient knows to notify me with persistent hyperglycemia episodes of hypoglycemia    Lab Results   Component Value Date    HGBA1C 8 7 (H) 05/02/2022

## 2022-06-28 NOTE — PATIENT INSTRUCTIONS
Follow up in about 3 months (around 9/28/2022), or if symptoms worsen or fail to improve, for pain med refill.     Dear patient,   As a result of recent federal legislation (The Federal Cures Act), you may receive lab or pathology results from your visit in your [facility] account before your physician is able to contact you. Your physician or their representative will relay the results to you with their recommendations at their soonest availability.     If no improvement in symptoms or symptoms worsen, please be advised to call MD, follow-up at clinic and/or go to ER if becomes severe.    Eliud King M.D.        We Offer TELEHEALTH & Same Day Appointments!   Book your Telehealth appointment with me through my nurse or   Clinic appointments on [facility]!    [address] [place] [address]    Office: [phone]   FAX: [phone]    Check out my Facebook Page and Follow Me at: [email or web address]    Check out my website at [email or web address] by clicking on: [email or web address]    To Schedule appointments online, go to [facility]: [email or web address]

## 2022-06-28 NOTE — ASSESSMENT & PLAN NOTE
A1c has improved on medication and lifestyle change.  Continue low-carbohydrate diet.We will plan to monitor hemoglobin A1c at designated intervals 3 to 6 months.  I recommend ongoing Education for diabetic diet and exercise protocol.  We will continue to monitor for side effects.    Please be advised of symptoms to monitor for and to notify me immediately if persistent or worsening.  Follow up with Ophthalmology/Optometry and Podiatry at least annually.

## 2022-06-28 NOTE — ASSESSMENT & PLAN NOTE
Start Singulair.  Avoid triggers.Discussed condition course and signs and symptoms to expect.  Patient advised take anti-inflammatories and or Tylenol for pain or fever.  ER precautions.  Call MD or follow-up to clinic if not improving or worsening symptoms.
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
## 2022-07-06 ENCOUNTER — PATIENT OUTREACH (OUTPATIENT)
Dept: ADMINISTRATIVE | Facility: HOSPITAL | Age: 67
End: 2022-07-06
Payer: MEDICARE

## 2022-07-20 ENCOUNTER — TELEPHONE (OUTPATIENT)
Dept: GASTROENTEROLOGY | Facility: CLINIC | Age: 67
End: 2022-07-20
Payer: MEDICARE

## 2022-07-20 NOTE — TELEPHONE ENCOUNTER
----- Message from Natividad Amador sent at 7/20/2022 10:03 AM CDT -----  Contact: [phone]  Type: Needs Medical Advice  Who Called:  Pt    Best Call Back Number: [phone]    Additional Information: Pt is calling because he was under the impression that the had a colonoscopy scheduled for Monday. Pt is a little confused because [facility]. Pls call back and advise

## 2022-07-25 ENCOUNTER — OFFICE VISIT (OUTPATIENT)
Dept: GASTROENTEROLOGY | Facility: CLINIC | Age: 67
End: 2022-07-25
Payer: MEDICARE

## 2022-07-25 ENCOUNTER — TELEPHONE (OUTPATIENT)
Dept: GASTROENTEROLOGY | Facility: CLINIC | Age: 67
End: 2022-07-25
Payer: MEDICARE

## 2022-07-25 VITALS — WEIGHT: 211.88 LBS | HEIGHT: 72 IN | BODY MASS INDEX: 28.7 KG/M2

## 2022-07-25 DIAGNOSIS — Z86.010 HISTORY OF COLON POLYPS: Primary | ICD-10-CM

## 2022-07-25 DIAGNOSIS — Z79.01 ANTICOAGULANT LONG-TERM USE: ICD-10-CM

## 2022-07-25 PROCEDURE — 99999 PR PBB SHADOW E&M-EST. PATIENT-LVL V: CPT | Mod: PBBFAC,,, | Performed by: NURSE PRACTITIONER

## 2022-07-25 PROCEDURE — 99499 UNLISTED E&M SERVICE: CPT | Mod: S$PBB,,, | Performed by: NURSE PRACTITIONER

## 2022-07-25 PROCEDURE — 99999 PR PBB SHADOW E&M-EST. PATIENT-LVL V: ICD-10-PCS | Mod: PBBFAC,,, | Performed by: NURSE PRACTITIONER

## 2022-07-25 PROCEDURE — 99499 NO LOS: ICD-10-PCS | Mod: S$PBB,,, | Performed by: NURSE PRACTITIONER

## 2022-07-25 PROCEDURE — 99215 OFFICE O/P EST HI 40 MIN: CPT | Mod: PBBFAC,PO | Performed by: NURSE PRACTITIONER

## 2022-07-25 RX ORDER — LATANOPROST 50 UG/ML
1 SOLUTION/ DROPS OPHTHALMIC NIGHTLY
COMMUNITY

## 2022-07-25 RX ORDER — METHOCARBAMOL 750 MG/1
750 TABLET, FILM COATED ORAL 4 TIMES DAILY
COMMUNITY

## 2022-07-25 RX ORDER — KETOCONAZOLE 20 MG/ML
SHAMPOO, SUSPENSION TOPICAL
COMMUNITY

## 2022-07-25 RX ORDER — LIDOCAINE 50 MG/G
1 PATCH TOPICAL
COMMUNITY

## 2022-07-25 RX ORDER — CLINDAMYCIN PHOSPHATE 10 MG/ML
SOLUTION TOPICAL 2 TIMES DAILY
COMMUNITY

## 2022-07-25 RX ORDER — TRAZODONE HYDROCHLORIDE 100 MG/1
100 TABLET ORAL NIGHTLY
COMMUNITY

## 2022-07-25 RX ORDER — CAPSAICIN 0 G/G
CREAM TOPICAL
COMMUNITY

## 2022-07-25 RX ORDER — DICLOFENAC SODIUM 10 MG/G
2 GEL TOPICAL DAILY
COMMUNITY

## 2022-07-25 RX ORDER — TRIAMCINOLONE ACETONIDE 1 MG/G
CREAM TOPICAL 2 TIMES DAILY
COMMUNITY

## 2022-07-25 RX ORDER — PRAZOSIN HYDROCHLORIDE 2 MG/1
2 CAPSULE ORAL 2 TIMES DAILY
COMMUNITY

## 2022-07-25 RX ORDER — ROSUVASTATIN CALCIUM 40 MG/1
40 TABLET, COATED ORAL DAILY
COMMUNITY

## 2022-07-25 RX ORDER — BRIMONIDINE TARTRATE 1 MG/ML
1 SOLUTION/ DROPS OPHTHALMIC 3 TIMES DAILY
COMMUNITY

## 2022-07-25 RX ORDER — FLUTICASONE PROPIONATE AND SALMETEROL 250; 50 UG/1; UG/1
1 POWDER RESPIRATORY (INHALATION) 2 TIMES DAILY
COMMUNITY

## 2022-07-25 NOTE — PATIENT INSTRUCTIONS

## 2022-07-25 NOTE — PROGRESS NOTES
Subjective:       Patient ID: Migue Sellers is a 66 y.o. male Body mass index is 28.73 kg/m².    Chief Complaint: Consult for [facility]/ [facility]    This patient is new to me.  Referring Provider: 's Administration for colonoscopy.  Established patient of Dr. Decker (last in 2015).     [facility] found [facility], summarized throughout progress note.  Blood work reviewed-see records for full results  Patient seen for colorectal cancer screening, high risk due to personal history of colon polyp, age appropriate, last colonoscopy was in 6/2015: see surgical history, with no associated signs/symptoms (see ROS), and no alleviating/exacerbating factors. Denies family history of colon cancer.    Review of Systems   Constitutional: Negative for appetite change, fatigue, fever and unexpected weight change.   HENT: Negative for trouble swallowing.    Respiratory: Negative for shortness of breath.    Cardiovascular: Negative for chest pain.   Gastrointestinal: Negative for abdominal distention, abdominal pain, anal bleeding, blood in stool, constipation, diarrhea, nausea, rectal pain and vomiting.        Bowel movements are once daily of formed stool   Neurological: Negative for dizziness.       Past Medical History:   Diagnosis Date    Anticoagulant long-term use     aspirn    Asthma     Cervical stenosis of spine     Colon polyps 06/08/2015    per colonoscopy report in     DDD (degenerative disc disease), lumbar     Depression     DJD (degenerative joint disease)     Hyperlipidemia     Hypertension     Intervertebral disc protrusion     Seasonal allergies     Skin abnormalities      Past Surgical History:   Procedure Laterality Date    cervical injection      COLONOSCOPY  06/08/2015    Dr. Decker: 3 colon polyps removed; repeat in 5 years for surveillance; biopsy:Hyperplastic polyps    EPIDURAL STEROID INJECTION INTO CERVICAL SPINE N/A 02/04/2019    Procedure:  Injection-steroid-epidural-cervical;  Surgeon: Phil Ann MD;  Location: [facility] OR;  Service: Pain Management;  Laterality: N/A;  to the LEFT    EPIDURAL STEROID INJECTION INTO CERVICAL SPINE N/A 2019    Procedure: Injection-steroid-epidural-cervical;  Surgeon: Phil Ann MD;  Location: [facility] OR;  Service: Pain Management;  Laterality: N/A;    EPIDURAL STEROID INJECTION INTO CERVICAL SPINE N/A 2020    Procedure: Injection-steroid-epidural-cervical;  Surgeon: Phil Ann MD;  Location: [facility] OR;  Service: Pain Management;  Laterality: N/A;    EPIDURAL STEROID INJECTION INTO CERVICAL SPINE N/A 2020    Procedure: Injection-steroid-epidural-cervical;  Surgeon: Phil Ann MD;  Location: [facility] OR;  Service: Pain Management;  Laterality: N/A;    EPIDURAL STEROID INJECTION INTO LUMBAR SPINE N/A 2019    Procedure: Injection-steroid-epidural- lumbar L5/S1;  Surgeon: Phil Ann MD;  Location: [facility] OR;  Service: Pain Management;  Laterality: N/A;    EYE SURGERY      MULTIPLE TOOTH EXTRACTIONS       Family History   Problem Relation Age of Onset    Colon cancer Neg Hx     Crohn's disease Neg Hx     Ulcerative colitis Neg Hx      Social History     Tobacco Use    Smoking status: Current Some Day Smoker     Packs/day: 0.25     Last attempt to quit: 2015     Years since quittin.2    Smokeless tobacco: Never Used    Tobacco comment: smokes once per month   Substance Use Topics    Alcohol use: No    Drug use: No     Wt Readings from Last 10 Encounters:   22 96.1 kg (211 lb 13.8 oz)   22 98.9 kg (218 lb 1.6 oz)   22 93.4 kg (206 lb)   22 94.9 kg (209 lb 4.8 oz)   22 94.7 kg (208 lb 11.2 oz)   21 94.8 kg (209 lb)   10/27/21 93.4 kg (206 lb)   21 98.4 kg (217 lb)   21 102.6 kg (226 lb 3.2 oz)   20 95.3 kg (210 lb)     Lab Results   Component Value Date    WBC 4.64 2014    HGB 17.0 2014     HCT 48.5 11/19/2014    MCV 87 11/19/2014     11/19/2014     CMP  Sodium   Date Value Ref Range Status   11/19/2014 140 136 - 145 mmol/L Final     Potassium   Date Value Ref Range Status   11/19/2014 4.0 3.5 - 5.1 mmol/L Final     Chloride   Date Value Ref Range Status   11/19/2014 103 95 - 110 mmol/L Final     CO2   Date Value Ref Range Status   11/19/2014 27 23 - 29 mmol/L Final     Glucose   Date Value Ref Range Status   11/19/2014 96 70 - 110 mg/dL Final     BUN   Date Value Ref Range Status   11/19/2014 13 8 - 23 mg/dL Final     Creatinine   Date Value Ref Range Status   11/19/2014 1.3 0.5 - 1.4 mg/dL Final     Calcium   Date Value Ref Range Status   11/19/2014 9.9 8.7 - 10.5 mg/dL Final     Total Protein   Date Value Ref Range Status   11/19/2014 7.8 6.0 - 8.4 g/dL Final     Albumin   Date Value Ref Range Status   11/19/2014 4.0 3.5 - 5.2 g/dL Final     Total Bilirubin   Date Value Ref Range Status   11/19/2014 0.9 0.1 - 1.0 mg/dL Final     Comment:     For infants and newborns, interpretation of results should be based  on gestational age, weight and in agreement with clinical  observations.  Premature Infant recommended reference ranges:  Up to 24 hours.............<8.0 mg/dL  Up to 48 hours............<12.0 mg/dL  3-5 days..................<15.0 mg/dL  6-29 days.................<15.0 mg/dL       Alkaline Phosphatase   Date Value Ref Range Status   11/19/2014 95 55 - 135 U/L Final     AST   Date Value Ref Range Status   11/19/2014 25 10 - 40 U/L Final     ALT   Date Value Ref Range Status   11/19/2014 21 10 - 44 U/L Final     Anion Gap   Date Value Ref Range Status   11/19/2014 10 8 - 16 mmol/L Final     eGFR if    Date Value Ref Range Status   11/19/2014 >60.0 >60 mL/min/1.73 m^2 Final     eGFR if non    Date Value Ref Range Status   11/19/2014 58.9 (A) >60 mL/min/1.73 m^2 Final     Comment:     Calculation used to obtain the estimated glomerular filtration  rate  (eGFR) is the CKD-EPI equation. Since race is unknown   in our information system, the eGFR values for   -American and Non--American patients are given   for each creatinine result.       Lab Results   Component Value Date    TSH 1.012 11/19/2014     Reviewed prior medical records including endoscopy history (see surgical history).    Objective:      Physical Exam  Vitals and nursing note reviewed.   Constitutional:       General: He is not in acute distress.     Appearance: Normal appearance. He is well-developed. He is not diaphoretic.      Comments: Patient using a cane for assistance with ambulation.   HENT:      Mouth/Throat:      Comments: Patient is wearing a face mask, which covers patient's mouth and nose, due to COVID 19 concerns.  Eyes:      General: No scleral icterus.     Conjunctiva/sclera: Conjunctivae normal.      Pupils: Pupils are equal, round, and reactive to light.   Pulmonary:      Effort: Pulmonary effort is normal. No respiratory distress.      Breath sounds: Normal breath sounds. No wheezing.   Abdominal:      General: Bowel sounds are normal. There is no distension or abdominal bruit.      Palpations: Abdomen is soft. Abdomen is not rigid. There is no mass.      Tenderness: There is no abdominal tenderness. There is no guarding or rebound. Negative signs include Valdes's sign and McBurney's sign.   Skin:     General: Skin is warm and dry.      Coloration: Skin is not pale.      Findings: No erythema or rash.      Comments: Non-jaundiced   Neurological:      Mental Status: He is alert and oriented to person, place, and time.   Psychiatric:         Behavior: Behavior normal.         Thought Content: Thought content normal.         Judgment: Judgment normal.         Assessment:       1. History of colon polyps    2. Anticoagulant long-term use        Plan:       History of colon polyps  - schedule Colonoscopy, discussed procedure with the patient, including risks and benefits,  patient verbalized understanding    Anticoagulant long-term use  - informed patient that the anticoagulant(s) will likely need to be held for endoscopy, nurse will confirm with endoscopist, cardiologist, and/or PCP.    Follow up in about 1 month (around 8/25/2022), or if symptoms worsen or fail to improve.      If no improvement in symptoms or symptoms worsen, call/follow-up at clinic or go to ER.        18 minutes of total time spent on the encounter, which includes face to face time and non-face to face time preparing to see the patient (e.g., review of tests), Obtaining and/or reviewing separately obtained history, Documenting clinical information in the electronic or other health record, Independently interpreting results (not separately reported) and communicating results to the patient/family/caregiver, or Care coordination (not separately reported).

## 2022-08-12 LAB
LEFT EYE DM RETINOPATHY: NEGATIVE
RIGHT EYE DM RETINOPATHY: NEGATIVE

## 2022-09-09 ENCOUNTER — PATIENT OUTREACH (OUTPATIENT)
Dept: ADMINISTRATIVE | Facility: HOSPITAL | Age: 67
End: 2022-09-09
Payer: MEDICARE

## 2022-09-27 ENCOUNTER — OFFICE VISIT (OUTPATIENT)
Dept: FAMILY MEDICINE | Facility: CLINIC | Age: 67
End: 2022-09-27
Payer: MEDICARE

## 2022-09-27 DIAGNOSIS — A49.9 BACTERIAL INFECTION: ICD-10-CM

## 2022-09-27 DIAGNOSIS — M54.12 CERVICAL RADICULOPATHY: ICD-10-CM

## 2022-09-27 DIAGNOSIS — J30.89 PERENNIAL ALLERGIC RHINITIS: Primary | ICD-10-CM

## 2022-09-27 DIAGNOSIS — Z23 NEED FOR PNEUMOCOCCAL VACCINE: ICD-10-CM

## 2022-09-27 DIAGNOSIS — M51.36 DDD (DEGENERATIVE DISC DISEASE), LUMBAR: ICD-10-CM

## 2022-09-27 DIAGNOSIS — Z23 FLU VACCINE NEED: ICD-10-CM

## 2022-09-27 DIAGNOSIS — Z79.899 ENCOUNTER FOR LONG-TERM (CURRENT) USE OF MEDICATIONS: ICD-10-CM

## 2022-09-27 PROBLEM — F43.21 ADJUSTMENT DISORDER WITH DEPRESSED MOOD: Status: ACTIVE | Noted: 2022-09-27

## 2022-09-27 PROBLEM — Z72.0 TOBACCO USER: Status: ACTIVE | Noted: 2022-09-27

## 2022-09-27 PROBLEM — M25.562 PAIN IN LEFT KNEE: Status: ACTIVE | Noted: 2022-09-27

## 2022-09-27 PROBLEM — H40.1114 PRIMARY OPEN ANGLE GLAUCOMA (POAG) OF RIGHT EYE, INDETERMINATE STAGE: Status: ACTIVE | Noted: 2022-09-27

## 2022-09-27 PROBLEM — B35.1 TINEA UNGUIUM: Status: ACTIVE | Noted: 2022-09-27

## 2022-09-27 PROBLEM — J42 CHRONIC BRONCHITIS: Status: ACTIVE | Noted: 2022-09-27

## 2022-09-27 PROBLEM — M54.2 CERVICALGIA: Status: ACTIVE | Noted: 2022-09-27

## 2022-09-27 PROBLEM — K05.30 CHRONIC PERIODONTITIS, UNSPECIFIED: Status: ACTIVE | Noted: 2022-09-27

## 2022-09-27 PROBLEM — H40.9 GLAUCOMA: Status: ACTIVE | Noted: 2022-09-27

## 2022-09-27 PROBLEM — D17.1 BENIGN LIPOMATOUS NEOPLASM OF SKIN AND SUBCUTANEOUS TISSUE OF TRUNK: Status: ACTIVE | Noted: 2022-09-27

## 2022-09-27 PROBLEM — M25.569 PAIN IN UNSPECIFIED KNEE: Status: ACTIVE | Noted: 2022-09-27

## 2022-09-27 PROBLEM — L30.4 INTERTRIGO: Status: ACTIVE | Noted: 2022-09-27

## 2022-09-27 PROBLEM — R61 EXCESSIVE SWEATING: Status: ACTIVE | Noted: 2022-09-27

## 2022-09-27 PROBLEM — R73.03 PREDIABETES: Status: ACTIVE | Noted: 2022-09-27

## 2022-09-27 PROBLEM — G89.29 CHRONIC PAIN: Status: ACTIVE | Noted: 2022-09-27

## 2022-09-27 PROBLEM — G56.00 CARPAL TUNNEL SYNDROME: Status: ACTIVE | Noted: 2022-09-27

## 2022-09-27 PROBLEM — M17.0 BILATERAL PRIMARY OSTEOARTHRITIS OF KNEE: Status: ACTIVE | Noted: 2022-09-27

## 2022-09-27 PROBLEM — L03.90 CELLULITIS, UNSPECIFIED: Status: ACTIVE | Noted: 2022-09-27

## 2022-09-27 PROBLEM — M25.562 ARTHRALGIA OF BOTH KNEES: Status: ACTIVE | Noted: 2022-09-27

## 2022-09-27 PROBLEM — D48.5 NEOPLASM OF UNCERTAIN BEHAVIOR OF SKIN: Status: ACTIVE | Noted: 2022-09-27

## 2022-09-27 PROBLEM — Z48.02 ENCOUNTER FOR REMOVAL OF SUTURES: Status: ACTIVE | Noted: 2022-09-27

## 2022-09-27 PROBLEM — L40.8 OTHER PSORIASIS: Status: ACTIVE | Noted: 2022-09-27

## 2022-09-27 PROBLEM — F43.12 CHRONIC POST-TRAUMATIC STRESS DISORDER: Status: ACTIVE | Noted: 2022-09-27

## 2022-09-27 PROBLEM — J45.20 MILD INTERMITTENT ASTHMA, UNCOMPLICATED: Status: ACTIVE | Noted: 2022-09-27

## 2022-09-27 PROBLEM — L28.0 LICHEN SIMPLEX CHRONICUS: Status: ACTIVE | Noted: 2022-09-27

## 2022-09-27 PROBLEM — J30.1 ALLERGIC RHINITIS DUE TO POLLEN: Status: ACTIVE | Noted: 2022-09-27

## 2022-09-27 PROBLEM — Q66.50 CONGENITAL PES PLANUS, UNSPECIFIED FOOT: Status: ACTIVE | Noted: 2022-09-27

## 2022-09-27 PROBLEM — L60.3 DYSTROPHIA UNGUIUM: Status: ACTIVE | Noted: 2022-09-27

## 2022-09-27 PROBLEM — M25.561 ARTHRALGIA OF BOTH KNEES: Status: ACTIVE | Noted: 2022-09-27

## 2022-09-27 PROBLEM — D12.6 BENIGN NEOPLASM OF COLON: Status: ACTIVE | Noted: 2022-09-27

## 2022-09-27 PROBLEM — M47.816 OSTEOARTHRITIS OF FACET JOINT OF LUMBAR SPINE: Status: ACTIVE | Noted: 2022-09-27

## 2022-09-27 PROBLEM — R60.0 LOCALIZED EDEMA: Status: ACTIVE | Noted: 2022-09-27

## 2022-09-27 PROCEDURE — 99214 PR OFFICE/OUTPT VISIT, EST, LEVL IV, 30-39 MIN: ICD-10-PCS | Mod: S$PBB,,, | Performed by: FAMILY MEDICINE

## 2022-09-27 PROCEDURE — 99999 PR PBB SHADOW E&M-EST. PATIENT-LVL IV: ICD-10-PCS | Mod: PBBFAC,,, | Performed by: FAMILY MEDICINE

## 2022-09-27 PROCEDURE — 99999 PR PBB SHADOW E&M-EST. PATIENT-LVL IV: CPT | Mod: PBBFAC,,, | Performed by: FAMILY MEDICINE

## 2022-09-27 PROCEDURE — 90677 PCV20 VACCINE IM: CPT | Mod: PBBFAC,PO

## 2022-09-27 PROCEDURE — 99214 OFFICE O/P EST MOD 30 MIN: CPT | Mod: PBBFAC,PO,25 | Performed by: FAMILY MEDICINE

## 2022-09-27 PROCEDURE — 99214 OFFICE O/P EST MOD 30 MIN: CPT | Mod: S$PBB,,, | Performed by: FAMILY MEDICINE

## 2022-09-27 PROCEDURE — G0008 ADMIN INFLUENZA VIRUS VAC: HCPCS | Mod: PBBFAC,PO

## 2022-09-27 RX ORDER — MONTELUKAST SODIUM 10 MG/1
10 TABLET ORAL NIGHTLY
Qty: 90 TABLET | Refills: 4 | Status: SHIPPED | OUTPATIENT
Start: 2022-09-27 | End: 2024-01-10 | Stop reason: SDUPTHER

## 2022-09-27 RX ORDER — METHYLPREDNISOLONE 4 MG/1
TABLET ORAL
Qty: 21 TABLET | Refills: 0 | Status: SHIPPED | OUTPATIENT
Start: 2022-09-27 | End: 2022-12-20

## 2022-09-27 RX ORDER — HYDROCODONE BITARTRATE AND ACETAMINOPHEN 10; 325 MG/1; MG/1
1 TABLET ORAL 4 TIMES DAILY
Qty: 360 TABLET | Refills: 0 | Status: SHIPPED | OUTPATIENT
Start: 2022-09-27 | End: 2022-12-20 | Stop reason: SDUPTHER

## 2022-09-27 RX ORDER — AMOXICILLIN 500 MG/1
500 TABLET, FILM COATED ORAL EVERY 12 HOURS
Qty: 20 TABLET | Refills: 0 | Status: SHIPPED | OUTPATIENT
Start: 2022-09-27 | End: 2022-10-07

## 2022-09-27 NOTE — ASSESSMENT & PLAN NOTE
Start Medrol Dosepak.  Avoid triggers.  Refill Singulair.  Discussed condition course and signs and symptoms to expect.  Patient advised take anti-inflammatories and or Tylenol for pain or fever.  ER precautions.  Call MD or follow-up to clinic if not improving or worsening symptoms.

## 2022-09-27 NOTE — PATIENT INSTRUCTIONS
Follow up in about 3 months (around 12/27/2022) for pain med refill.     Dear patient,   As a result of recent federal legislation (The Federal Cures Act), you may receive lab or pathology results from your visit in your [facility] account before your physician is able to contact you. Your physician or their representative will relay the results to you with their recommendations at their soonest availability.     If no improvement in symptoms or symptoms worsen, please be advised to call MD, follow-up at clinic and/or go to ER if becomes severe.    Eliud King M.D.        We Offer TELEHEALTH & Same Day Appointments!   Book your Telehealth appointment with me through my nurse or   Clinic appointments on [employer]!    [address] [place] [address]    Office: [phone]   FAX: [phone]    Check out my Facebook Page and Follow Me at: [email or web address]    Check out my website at [employer] by clicking on: [email or web address]    To Schedule appointments online, go to [employer]: [email or web address]

## 2022-09-27 NOTE — ASSESSMENT & PLAN NOTE
Start amoxicillin.Discussed condition course and signs and symptoms to expect.  Patient advised take anti-inflammatories and or Tylenol for pain or fever.  ER precautions.  Call MD or follow-up to clinic if not improving or worsening symptoms.
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
## 2022-09-28 ENCOUNTER — TELEPHONE (OUTPATIENT)
Dept: GASTROENTEROLOGY | Facility: CLINIC | Age: 67
End: 2022-09-28
Payer: MEDICARE

## 2022-09-28 NOTE — TELEPHONE ENCOUNTER
Called patient in regards to new prep, no answer left vm with callback #. Send prep to pt portal.

## 2022-10-05 ENCOUNTER — HOSPITAL ENCOUNTER (OUTPATIENT)
Facility: HOSPITAL | Age: 67
Discharge: HOME | End: 2022-10-05
Attending: INTERNAL MEDICINE | Admitting: INTERNAL MEDICINE
Payer: MEDICARE

## 2022-10-05 ENCOUNTER — ANESTHESIA EVENT (OUTPATIENT)
Dept: ENDOSCOPY | Facility: HOSPITAL | Age: 67
End: 2022-10-05
Payer: MEDICARE

## 2022-10-05 ENCOUNTER — ANESTHESIA (OUTPATIENT)
Dept: ENDOSCOPY | Facility: HOSPITAL | Age: 67
End: 2022-10-05
Payer: MEDICARE

## 2022-10-05 VITALS
TEMPERATURE: 97 F | RESPIRATION RATE: 18 BRPM | HEIGHT: 72 IN | BODY MASS INDEX: 28.58 KG/M2 | OXYGEN SATURATION: 97 % | HEART RATE: 71 BPM | SYSTOLIC BLOOD PRESSURE: 129 MMHG | DIASTOLIC BLOOD PRESSURE: 77 MMHG | WEIGHT: 211 LBS

## 2022-10-05 DIAGNOSIS — Z86.010 HX OF COLONIC POLYPS: ICD-10-CM

## 2022-10-05 PROCEDURE — D9220A PRA ANESTHESIA: ICD-10-PCS | Mod: PT,ANES,, | Performed by: ANESTHESIOLOGY

## 2022-10-05 PROCEDURE — 88305 TISSUE EXAM BY PATHOLOGIST: CPT | Mod: 59 | Performed by: PATHOLOGY

## 2022-10-05 PROCEDURE — D9220A PRA ANESTHESIA: ICD-10-PCS | Mod: PT,CRNA,, | Performed by: NURSE ANESTHETIST, CERTIFIED REGISTERED

## 2022-10-05 PROCEDURE — 37000008 HC ANESTHESIA 1ST 15 MINUTES: Mod: PO | Performed by: INTERNAL MEDICINE

## 2022-10-05 PROCEDURE — 27201089 HC SNARE, DISP (ANY): Mod: PO | Performed by: INTERNAL MEDICINE

## 2022-10-05 PROCEDURE — 45385 COLONOSCOPY W/LESION REMOVAL: CPT | Mod: PT,PO | Performed by: INTERNAL MEDICINE

## 2022-10-05 PROCEDURE — D9220A PRA ANESTHESIA: Mod: PT,ANES,, | Performed by: ANESTHESIOLOGY

## 2022-10-05 PROCEDURE — 45385 COLONOSCOPY W/LESION REMOVAL: CPT | Mod: PT,,, | Performed by: INTERNAL MEDICINE

## 2022-10-05 PROCEDURE — 88305 TISSUE EXAM BY PATHOLOGIST: CPT | Mod: 26,,, | Performed by: PATHOLOGY

## 2022-10-05 PROCEDURE — D9220A PRA ANESTHESIA: Mod: PT,CRNA,, | Performed by: NURSE ANESTHETIST, CERTIFIED REGISTERED

## 2022-10-05 PROCEDURE — 88305 TISSUE EXAM BY PATHOLOGIST: ICD-10-PCS | Mod: 26,,, | Performed by: PATHOLOGY

## 2022-10-05 PROCEDURE — 63600175 PHARM REV CODE 636 W HCPCS: Mod: PO | Performed by: NURSE ANESTHETIST, CERTIFIED REGISTERED

## 2022-10-05 PROCEDURE — 45385 PR COLONOSCOPY,REMV LESN,SNARE: ICD-10-PCS | Mod: PT,,, | Performed by: INTERNAL MEDICINE

## 2022-10-05 PROCEDURE — 37000009 HC ANESTHESIA EA ADD 15 MINS: Mod: PO | Performed by: INTERNAL MEDICINE

## 2022-10-05 RX ORDER — SODIUM CHLORIDE, SODIUM LACTATE, POTASSIUM CHLORIDE, CALCIUM CHLORIDE 600; 310; 30; 20 MG/100ML; MG/100ML; MG/100ML; MG/100ML
INJECTION, SOLUTION INTRAVENOUS CONTINUOUS
Status: DISCONTINUED | OUTPATIENT
Start: 2022-10-05 | End: 2022-10-05 | Stop reason: HOSPADM

## 2022-10-05 RX ORDER — PROPOFOL 10 MG/ML
VIAL (ML) INTRAVENOUS
Status: DISCONTINUED | OUTPATIENT
Start: 2022-10-05 | End: 2022-10-05

## 2022-10-05 RX ORDER — SODIUM CHLORIDE 0.9 % (FLUSH) 0.9 %
10 SYRINGE (ML) INJECTION
Status: DISCONTINUED | OUTPATIENT
Start: 2022-10-05 | End: 2022-10-05 | Stop reason: HOSPADM

## 2022-10-05 RX ADMIN — PROPOFOL 30 MG: 10 INJECTION, EMULSION INTRAVENOUS at 07:10

## 2022-10-05 RX ADMIN — PROPOFOL 100 MG: 10 INJECTION, EMULSION INTRAVENOUS at 07:10

## 2022-10-05 NOTE — PROVATION PATIENT INSTRUCTIONS
Discharge Summary/Instructions after an Endoscopic Procedure  Patient Name: Migue Sellers  Patient MRN: [id number]  Patient YOB: 1955 Wednesday, October 5, 2022  Isael Decker MD  Dear patient,  As a result of recent federal legislation (The Federal Cures Act), you may   receive lab or pathology results from your procedure in your [facility]   account before your physician is able to contact you. Your physician or   their representative will relay the results to you with their   recommendations at their soonest availability.  Thank you,  RESTRICTIONS:  During your procedure today, you received medications for sedation.  These   medications may affect your judgment, balance and coordination.  Therefore,   for 24 hours, you have the following restrictions:   - DO NOT drive a car, operate machinery, make legal/financial decisions,   sign important papers or drink alcohol.    ACTIVITY:  Today: no heavy lifting, straining or running due to procedural   sedation/anesthesia.  The following day: return to full activity including work.  DIET:  Eat and drink normally unless instructed otherwise.     TREATMENT FOR COMMON SIDE EFFECTS:  - Mild abdominal pain, nausea, belching, bloating or excessive gas:  rest,   eat lightly and use a heating pad.  - Sore Throat: treat with throat lozenges and/or gargle with warm salt   water.  - Because air was used during the procedure, expelling large amounts of air   from your rectum or belching is normal.  - If a bowel prep was taken, you may not have a bowel movement for 1-3 days.    This is normal.  SYMPTOMS TO WATCH FOR AND REPORT TO YOUR PHYSICIAN:  1. Abdominal pain or bloating, other than gas cramps.  2. Chest pain.  3. Back pain.  4. Signs of infection such as: chills or fever occurring within 24 hours   after the procedure.  5. Rectal bleeding, which would show as bright red, maroon, or black stools.   (A tablespoon of blood from the rectum is not serious, especially  if   hemorrhoids are present.)  6. Vomiting.  7. Weakness or dizziness.  GO DIRECTLY TO THE NEAREST EMERGENCY ROOM IF YOU HAVE ANY OF THE FOLLOWING:      Difficulty breathing              Chills and/or fever over 101 F   Persistent vomiting and/or vomiting blood   Severe abdominal pain   Severe chest pain   Black, tarry stools   Bleeding- more than one tablespoon   Any other symptom or condition that you feel may need urgent attention  Your doctor recommends these additional instructions:  If any biopsies were taken, your doctors clinic will contact you in 1 to 2   weeks with any results.  We are waiting for your pathology results.   Your physician has recommended a repeat colonoscopy in five years for   surveillance based on pathology results.   You are being discharged to home.  For questions, problems or results please call your physician - Isael Decker MD at Work:  [phone].  EMERGENCY PHONE NUMBER: [phone], LAB RESULTS: [phone]  IF A COMPLICATION OR EMERGENCY SITUATION ARISES AND YOU ARE UNABLE TO REACH   YOUR PHYSICIAN - GO DIRECTLY TO THE EMERGENCY ROOM.  ___________________________________________  Nurse Signature  ___________________________________________  Patient/Designated Responsible Party Signature  Isael Decker MD  10/5/2022 7:48:05 AM  This report has been verified and signed electronically.  Dear patient,  As a result of recent federal legislation (The Federal Cures Act), you may   receive lab or pathology results from your procedure in your [facility]   account before your physician is able to contact you. Your physician or   their representative will relay the results to you with their   recommendations at their soonest availability.  Thank you.  PROVATION

## 2022-10-05 NOTE — H&P
History & Physical - Short Stay  Gastroenterology      SUBJECTIVE:     Procedure: Colonoscopy    Chief Complaint/Indication for Procedure: Previous Polyps    PTA Medications   Medication Sig    alogliptin (NESINA) 25 mg Tab Take 25 mg by mouth once daily at 6am.    ALPRAZolam (XANAX) 1 MG tablet Take 1 mg by mouth 2 (two) times daily. Sandi Castrejon [facility]    hydroCHLOROthiazide (HYDRODIURIL) 25 MG tablet Take 1 tablet (25 mg total) by mouth once daily.    HYDROcodone-acetaminophen (NORCO)  mg per tablet Take 1 tablet by mouth 4 (four) times daily.    loratadine (CLARITIN) 10 mg tablet Take 1 tablet (10 mg total) by mouth once daily.    methocarbamoL (ROBAXIN) 750 MG Tab Take 750 mg by mouth 4 (four) times daily. Take one tablet by mouth four times a day as needed as a muscle relaxant    montelukast (SINGULAIR) 10 mg tablet Take 1 tablet (10 mg total) by mouth every evening.    potassium chloride (KLOR-CON) 10 MEQ TbSR Take 2 tablets (20 mEq total) by mouth 2 (two) times daily.    prazosin (MINIPRESS) 2 MG Cap Take 2 mg by mouth 2 (two) times daily. Take 2 capsules by mouth at bedtime for PTSD    rosuvastatin (CRESTOR) 40 MG Tab Take 40 mg by mouth once daily. Take one tablet by mouth every day for cholesterol    SYMBICORT 80-4.5 mcg/actuation HFAA 2 puffs daily as needed.     albuterol 90 mcg/actuation inhaler Inhale 2 puffs into the lungs every 6 (six) hours as needed for Wheezing.    amoxicillin (AMOXIL) 500 MG Tab Take 1 tablet (500 mg total) by mouth every 12 (twelve) hours. for 10 days (Patient not taking: Reported on 10/3/2022)    aspirin 81 MG Chew Take 1 tablet (81 mg total) by mouth once daily. (Patient not taking: Reported on 10/3/2022)    atenolol (TENORMIN) 25 MG tablet Take 0.5 tablets (12.5 mg total) by mouth once daily. (Patient not taking: Reported on 10/3/2022)    brimonidine 0.1% (ALPHAGAN P) 0.1 % Drop Place 1 drop into both eyes 3 (three) times daily.    capsaicin 0.1 % Crea  Apply topically.    clindamycin (CLEOCIN T) 1 % Swab Apply topically 2 (two) times daily. Apply small amount topically twice a day for infection. Apply twice every day as needed to bumps in scalp and beard area.    diclofenac sodium (VOLTAREN) 1 % Gel Apply 2 g topically once daily.    diltiaZEM (DILACOR XR) 180 MG CDCR Take 1 capsule (180 mg total) by mouth 2 (two) times daily.    dorzolamide HCl/timolol maleat (DORZOLAMIDE-TIMOLOL OPHT) Apply to eye. Instill 1 drop in each eye twice a day for glaucoma.    fluticasone-salmeterol diskus inhaler 250-50 mcg Inhale 1 puff into the lungs 2 (two) times daily. Inhale 1 puff by mouth every 12 hours for asthma or COPD    gabapentin (NEURONTIN) 300 MG capsule Take 2 capsules (600 mg total) by mouth 3 (three) times daily.    ketoconazole (NIZORAL) 2 % shampoo Apply topically twice a week.    latanoprost 0.005 % ophthalmic solution 1 drop every evening. Instill 1 drop in each eye at bedtime for glaucoma    LIDOcaine (LIDODERM) 5 % Place 1 patch onto the skin every 24 hours. Remove & Discard patch within 12 hours or as directed by MD [facility], POLYV ALCOHOL, 1.4 % ophthalmic solution     methylPREDNISolone (MEDROL DOSEPACK) 4 mg tablet follow package directions (Patient not taking: Reported on 10/3/2022)    mupirocin (BACTROBAN) 2 % ointment Apply topically 3 (three) times daily.    travoprost, benzalkonium, (TRAVATAN) 0.004 % ophthalmic solution Place 1 drop into both eyes nightly.    traZODone (DESYREL) 100 MG tablet Take 100 mg by mouth every evening. Take one-half tablet by mouth at bedtime for sleep.    triamcinolone acetonide 0.1% (KENALOG) 0.1 % cream Apply topically 2 (two) times daily.    UREA-HYDROPHILIC CREAM TOP Apply topically.       Review of patient's allergies indicates:   Allergen Reactions    Amlodipine Swelling    Ace inhibitors Swelling     Other reaction(s): Impaired Renal Function, Renal impairment        Past Medical History:   Diagnosis Date     Anticoagulant long-term use     aspirn    Asthma     Cervical stenosis of spine     Colon polyps 06/08/2015    per colonoscopy report in     DDD (degenerative disc disease), lumbar     Depression     DJD (degenerative joint disease)     Hyperlipidemia     Hypertension     Intervertebral disc protrusion     Seasonal allergies     Skin abnormalities      Past Surgical History:   Procedure Laterality Date    cervical injection      COLONOSCOPY  06/08/2015    Dr. Decker: 3 colon polyps removed; repeat in 5 years for surveillance; biopsy:Hyperplastic polyps    EPIDURAL STEROID INJECTION INTO CERVICAL SPINE N/A 02/04/2019    Procedure: Injection-steroid-epidural-cervical;  Surgeon: Phil Ann MD;  Location: [facility] OR;  Service: Pain Management;  Laterality: N/A;  to the LEFT    EPIDURAL STEROID INJECTION INTO CERVICAL SPINE N/A 09/25/2019    Procedure: Injection-steroid-epidural-cervical;  Surgeon: Phil Ann MD;  Location: [facility] OR;  Service: Pain Management;  Laterality: N/A;    EPIDURAL STEROID INJECTION INTO CERVICAL SPINE N/A 02/07/2020    Procedure: Injection-steroid-epidural-cervical;  Surgeon: Phil Ann MD;  Location: [facility] OR;  Service: Pain Management;  Laterality: N/A;    EPIDURAL STEROID INJECTION INTO CERVICAL SPINE N/A 07/27/2020    Procedure: Injection-steroid-epidural-cervical;  Surgeon: Phil Ann MD;  Location: [facility] OR;  Service: Pain Management;  Laterality: N/A;    EPIDURAL STEROID INJECTION INTO LUMBAR SPINE N/A 11/05/2019    Procedure: Injection-steroid-epidural- lumbar L5/S1;  Surgeon: Phil Ann MD;  Location: [facility] OR;  Service: Pain Management;  Laterality: N/A;    EYE SURGERY      MULTIPLE TOOTH EXTRACTIONS       Family History   Problem Relation Age of Onset    Colon cancer Neg Hx     Crohn's disease Neg Hx     Ulcerative colitis Neg Hx      Social History     Tobacco Use    Smoking status: Some Days     Packs/day: 0.25     Types: Cigarettes     Last  attempt to quit: 2015     Years since quittin.4     Passive exposure: Never    Smokeless tobacco: Never    Tobacco comments:     smokes once per month   Substance Use Topics    Alcohol use: No    Drug use: No         OBJECTIVE:     Vital Signs (Most Recent)  Temp: 97.7 °F (36.5 °C) (10/05/22 0642)  Pulse: 65 (10/05/22 0642)  Resp: 16 (10/05/22 0642)  BP: 136/73 (10/05/22 0642)  SpO2: 97 % (10/05/22 0642)    Physical Exam:                                                       GENERAL:  Comfortable, in no acute distress.                                 HEENT EXAM:  Nonicteric.  No adenopathy.  Oropharynx is clear.               NECK:  Supple.                                                               LUNGS:  Clear.                                                               CARDIAC:  Regular rate and rhythm.  S1, S2.  No murmur.                      ABDOMEN:  Soft, positive bowel sounds, nontender.  No hepatosplenomegaly or masses.  No rebound or guarding.                                             EXTREMITIES:  No edema.     MENTAL STATUS:  Normal, alert and oriented.      ASSESSMENT/PLAN:     Assessment: Previous Polyps    Plan: Colonoscopy    Anesthesia Plan: General    ASA Grade: ASA 2 - Patient with mild systemic disease with no functional limitations    MALLAMPATI SCORE:  I (soft palate, uvula, fauces, and tonsillar pillars visible)

## 2022-10-05 NOTE — DISCHARGE SUMMARY
[facility] - Endoscopy  Discharge Note  Short Stay    Discharge Note  Short Stay      SUMMARY     Admit Date: 10/5/2022    Attending Physician: Isael Decker MD     Discharge Physician: Isael Decker MD    Discharge Date: 10/5/2022 7:49 AM    Final Diagnosis: History of colon polyps [Z86.010]    Disposition: HOME OR SELF CARE    Patient Instructions:   Current Discharge Medication List        CONTINUE these medications which have NOT CHANGED    Details   alogliptin (NESINA) 25 mg Tab Take 25 mg by mouth once daily at 6am.  Qty: 90 tablet, Refills: 4    Associated Diagnoses: Type 2 diabetes mellitus with hyperglycemia, without long-term current use of insulin      ALPRAZolam (XANAX) 1 MG tablet Take 1 mg by mouth 2 (two) times daily. Sandi Castrejon [facility]      hydroCHLOROthiazide (HYDRODIURIL) 25 MG tablet Take 1 tablet (25 mg total) by mouth once daily.  Qty: 90 tablet, Refills: 4    Comments: .  Associated Diagnoses: Benign essential HTN; Encounter for long-term (current) use of medications      HYDROcodone-acetaminophen (NORCO)  mg per tablet Take 1 tablet by mouth 4 (four) times daily.  Qty: 360 tablet, Refills: 0    Comments: Quantity prescribed more than 7 day supply? Yes, quantity medically necessary  Associated Diagnoses: Cervical radiculopathy; DDD (degenerative disc disease), lumbar; Encounter for long-term (current) use of medications      loratadine (CLARITIN) 10 mg tablet Take 1 tablet (10 mg total) by mouth once daily.  Qty: 90 tablet, Refills: 3      methocarbamoL (ROBAXIN) 750 MG Tab Take 750 mg by mouth 4 (four) times daily. Take one tablet by mouth four times a day as needed as a muscle relaxant      montelukast (SINGULAIR) 10 mg tablet Take 1 tablet (10 mg total) by mouth every evening.  Qty: 90 tablet, Refills: 4    Associated Diagnoses: Perennial allergic rhinitis      potassium chloride (KLOR-CON) 10 MEQ TbSR Take 2 tablets (20 mEq total) by mouth 2 (two) times  daily.  Qty: 360 tablet, Refills: 3      prazosin (MINIPRESS) 2 MG Cap Take 2 mg by mouth 2 (two) times daily. Take 2 capsules by mouth at bedtime for PTSD      rosuvastatin (CRESTOR) 40 MG Tab Take 40 mg by mouth once daily. Take one tablet by mouth every day for cholesterol      SYMBICORT 80-4.5 mcg/actuation HFAA 2 puffs daily as needed.       albuterol 90 mcg/actuation inhaler Inhale 2 puffs into the lungs every 6 (six) hours as needed for Wheezing.  Qty: 54 g, Refills: 4    Associated Diagnoses: URI (upper respiratory infection)      amoxicillin (AMOXIL) 500 MG Tab Take 1 tablet (500 mg total) by mouth every 12 (twelve) hours. for 10 days  Qty: 20 tablet, Refills: 0    Associated Diagnoses: Bacterial infection      aspirin 81 MG Chew Take 1 tablet (81 mg total) by mouth once daily.  Qty: 90 tablet, Refills: 3      atenolol (TENORMIN) 25 MG tablet Take 0.5 tablets (12.5 mg total) by mouth once daily.  Qty: 90 tablet, Refills: 3      brimonidine 0.1% (ALPHAGAN P) 0.1 % Drop Place 1 drop into both eyes 3 (three) times daily.      capsaicin 0.1 % Crea Apply topically.      clindamycin (CLEOCIN T) 1 % Swab Apply topically 2 (two) times daily. Apply small amount topically twice a day for infection. Apply twice every day as needed to bumps in scalp and beard area.      diclofenac sodium (VOLTAREN) 1 % Gel Apply 2 g topically once daily.      diltiaZEM (DILACOR XR) 180 MG CDCR Take 1 capsule (180 mg total) by mouth 2 (two) times daily.  Qty: 180 capsule, Refills: 3      dorzolamide HCl/timolol maleat (DORZOLAMIDE-TIMOLOL OPHT) Apply to eye. Instill 1 drop in each eye twice a day for glaucoma.      fluticasone-salmeterol diskus inhaler 250-50 mcg Inhale 1 puff into the lungs 2 (two) times daily. Inhale 1 puff by mouth every 12 hours for asthma or COPD      gabapentin (NEURONTIN) 300 MG capsule Take 2 capsules (600 mg total) by mouth 3 (three) times daily.  Qty: 540 capsule, Refills: 3      ketoconazole (NIZORAL) 2 %  shampoo Apply topically twice a week.      latanoprost 0.005 % ophthalmic solution 1 drop every evening. Instill 1 drop in each eye at bedtime for glaucoma      LIDOcaine (LIDODERM) 5 % Place 1 patch onto the skin every 24 hours. Remove & Discard patch within 12 hours or as directed by MD      LUBRICMISTY EYE, POLYV ALCOHOL, 1.4 % ophthalmic solution       methylPREDNISolone (MEDROL DOSEPACK) 4 mg tablet follow package directions  Qty: 21 tablet, Refills: 0    Associated Diagnoses: Perennial allergic rhinitis      mupirocin (BACTROBAN) 2 % ointment Apply topically 3 (three) times daily.  Qty: 1 Tube, Refills: 0      travoprost, benzalkonium, (TRAVATAN) 0.004 % ophthalmic solution Place 1 drop into both eyes nightly.  Qty: 5 mL, Refills: 3      traZODone (DESYREL) 100 MG tablet Take 100 mg by mouth every evening. Take one-half tablet by mouth at bedtime for sleep.      triamcinolone acetonide 0.1% (KENALOG) 0.1 % cream Apply topically 2 (two) times daily.      UREA-HYDROPHILIC CREAM TOP Apply topically.             Discharge Procedure Orders (must include Diet, Follow-up, Activity)    Follow Up:  Follow up with PCP as previously scheduled  Resume routine diet.  Activity as tolerated.    No driving day of procedure.

## 2022-10-05 NOTE — TRANSFER OF CARE
Anesthesia Transfer of Care Note    Patient: Migue Sellers    Procedure(s) Performed: Procedure(s) (LRB):  COLONOSCOPY (N/A)    Patient location: PACU    Anesthesia Type: general    Transport from OR: Transported from OR on room air with adequate spontaneous ventilation    Post pain: adequate analgesia    Post assessment: no apparent anesthetic complications and tolerated procedure well    Post vital signs: stable    Level of consciousness: awake and oriented    Nausea/Vomiting: no nausea/vomiting    Complications: none    Transfer of care protocol was followed      Last vitals:   Visit Vitals  /73   Pulse 65   Temp 36.5 °C (97.7 °F) (Skin)   Resp 16   Ht 6' (1.829 m)   Wt 95.7 kg (211 lb)   SpO2 97%   BMI 28.62 kg/m²

## 2022-10-06 NOTE — ANESTHESIA PREPROCEDURE EVALUATION
10/06/2022  Migue Sellers is a 67 y.o., male.    Pre-op Assessment    I have reviewed the Patient Summary Reports.    I have reviewed the Nursing Notes.    I have reviewed the Medications.     Review of Systems  Anesthesia Hx:  No problems with previous Anesthesia  Denies Family Hx of Anesthesia complications.   Denies Personal Hx of Anesthesia complications.   Social:  Current every other day smoker   Cardiovascular:   Hypertension hyperlipidemia    Pulmonary:   Asthma mild    Musculoskeletal:   Arthritis  Cervical stenosis Spine Disorders: Disc disease and Degenerative disease    Psych:   Psychiatric History anxiety depression          Physical Exam  General:  Obesity      Airway/Jaw/Neck:  Airway Findings: Mouth Opening: Normal   Tongue: Normal   Mallampati: II TM Distance: Normal, at least 6 cm        Chest/Lungs:  Chest/Lungs Findings: Clear to auscultation, Normal Respiratory Rate      Heart/Vascular:  Heart Findings: Rate: Normal  Rhythm: Regular Rhythm        Mental Status:  Mental Status Findings:  Cooperative, Alert and Oriented         Anesthesia Plan  Type of Anesthesia, risks & benefits discussed:  Anesthesia Type:  Gen Natural Airway    Patient's Preference:   Plan Factors:          Intra-op Monitoring Plan: Standard ASA Monitors  Intra-op Monitoring Plan Comments:   Post Op Pain Control Plan:   Post Op Pain Control Plan Comments:     Induction:   IV  Beta Blocker:         Informed Consent: Informed consent signed with the Patient and all parties understand the risks and agree with anesthesia plan.  All questions answered.  Anesthesia consent signed with patient.  ASA Score: 3     Day of Surgery Review of History & Physical:  There are no significant changes.  H&P Update referred to the surgeon/provider.          Ready For Surgery From Anesthesia Perspective.           Physical Exam  General:  Obesity    Airway:  Mallampati: II   Mouth Opening: Normal  TM Distance: Normal, at least 6 cm  Tongue: Normal    Chest/Lungs:  Clear to auscultation, Normal Respiratory Rate    Heart:  Rate: Normal  Rhythm: Regular Rhythm          Anesthesia Plan  Type of Anesthesia, risks & benefits discussed:    Anesthesia Type: Gen Natural Airway  Intra-op Monitoring Plan: Standard ASA Monitors  Induction:  IV  Informed Consent: Informed consent signed with the Patient and all parties understand the risks and agree with anesthesia plan.  All questions answered.   ASA Score: 3  Day of Surgery Review of History & Physical: H&P Update referred to the surgeon/provider.    Ready For Surgery From Anesthesia Perspective.       .

## 2022-10-06 NOTE — ANESTHESIA POSTPROCEDURE EVALUATION
Anesthesia Post Evaluation    Patient: Migue Sellers    Procedure(s) Performed: Procedure(s) (LRB):  COLONOSCOPY (N/A)    Final Anesthesia Type: general      Patient location during evaluation: PACU  Patient participation: Yes- Able to Participate  Level of consciousness: awake and alert  Post-procedure vital signs: reviewed and stable  Pain management: adequate  Airway patency: patent    PONV status at discharge: No PONV  Anesthetic complications: no      Cardiovascular status: blood pressure returned to baseline  Respiratory status: unassisted and room air  Hydration status: euvolemic  Follow-up not needed.          Vitals Value Taken Time   /77 10/05/22 0812   Temp 36.1 °C (97 °F) 10/05/22 0746   Pulse 71 10/05/22 0812   Resp 18 10/05/22 0812   SpO2 97 % 10/05/22 0812         Event Time   Out of Recovery 08:13:00         Pain/Cesar Score: Cesar Score: 10 (10/5/2022  7:59 AM)

## 2022-10-11 ENCOUNTER — PATIENT OUTREACH (OUTPATIENT)
Dept: ADMINISTRATIVE | Facility: HOSPITAL | Age: 67
End: 2022-10-11
Payer: MEDICARE

## 2022-10-11 LAB
FINAL PATHOLOGIC DIAGNOSIS: NORMAL
GROSS: NORMAL
Lab: NORMAL

## 2022-10-11 NOTE — LETTER
AUTHORIZATION FOR RELEASE OF   CONFIDENTIAL INFORMATION      We are seeing Migue Sellers, date of birth 1955, in the clinic at [facility]. Eliud King MD is the patient's PCP. Migue Sellers has an outstanding lab/procedure at the time we reviewed his chart. In order to help keep his health information updated, he has authorized us to request the following medical record(s):        (  )  MAMMOGRAM                                      (  )  COLONOSCOPY      (  )  PAP SMEAR                                          (  )  OUTSIDE LAB RESULTS     (  )  DEXA SCAN                                   ( x ) MOST RECENT DIABETIC EYE EXAM            (  )  FOOT EXAM                                          (  )  ENTIRE RECORD     (  )  OUTSIDE IMMUNIZATIONS                 (  )  _______________         Please fax records to [facility], Brian T Callihan, MD, [phone]     If you have any questions, please contact    SEYMOUR Reed at [phone]          Patient Name: Migue Sellers  : 1955  Patient Phone #: [phone]

## 2022-10-26 NOTE — PROGRESS NOTES
I called [facility] to request DM Eye Exam. Staff states they have not received request for me to re-fax request. I have re-faxed request and will f/u in 1 week if no response.

## 2022-11-02 NOTE — PROGRESS NOTES
I called [facility] again and informed staff I still have not received eye exam. Staff informed me to re-fax request again, I got confirmation that [facility] and she faxed it with me on the phone. I will f/u in 1 week if nothing is received.

## 2022-11-03 NOTE — PROGRESS NOTES
Per [facility] - No dm eye exam 9/27/2022   Called and spoke with representative who states that the request was asking for an eye exam from 9/27/2022. I explained to her that date was when the patient signed the request. She asked that we resend the request.   [facility] DM EYE EXAM 9/27/2022 faxed [facility]x to request most recent dm eye exam records. Remind me set 1 week.

## 2022-11-16 LAB — HBA1C MFR BLD: 7 % (ref 4.2–5.8)

## 2022-12-20 ENCOUNTER — OFFICE VISIT (OUTPATIENT)
Dept: FAMILY MEDICINE | Facility: CLINIC | Age: 67
End: 2022-12-20
Payer: MEDICARE

## 2022-12-20 VITALS
WEIGHT: 227 LBS | DIASTOLIC BLOOD PRESSURE: 72 MMHG | HEIGHT: 72 IN | OXYGEN SATURATION: 99 % | BODY MASS INDEX: 30.75 KG/M2 | SYSTOLIC BLOOD PRESSURE: 112 MMHG | HEART RATE: 62 BPM | TEMPERATURE: 97 F

## 2022-12-20 DIAGNOSIS — E11.65 TYPE 2 DIABETES MELLITUS WITH HYPERGLYCEMIA, WITHOUT LONG-TERM CURRENT USE OF INSULIN: ICD-10-CM

## 2022-12-20 DIAGNOSIS — Z79.899 ENCOUNTER FOR LONG-TERM (CURRENT) USE OF MEDICATIONS: ICD-10-CM

## 2022-12-20 DIAGNOSIS — M51.36 DDD (DEGENERATIVE DISC DISEASE), LUMBAR: ICD-10-CM

## 2022-12-20 DIAGNOSIS — M54.12 CERVICAL RADICULOPATHY: ICD-10-CM

## 2022-12-20 PROCEDURE — 99214 OFFICE O/P EST MOD 30 MIN: CPT | Mod: S$PBB,,, | Performed by: FAMILY MEDICINE

## 2022-12-20 PROCEDURE — 99999 PR PBB SHADOW E&M-EST. PATIENT-LVL III: ICD-10-PCS | Mod: PBBFAC,,, | Performed by: FAMILY MEDICINE

## 2022-12-20 PROCEDURE — 99999 PR PBB SHADOW E&M-EST. PATIENT-LVL III: CPT | Mod: PBBFAC,,, | Performed by: FAMILY MEDICINE

## 2022-12-20 PROCEDURE — 99213 OFFICE O/P EST LOW 20 MIN: CPT | Mod: PBBFAC,PO | Performed by: FAMILY MEDICINE

## 2022-12-20 PROCEDURE — 99214 PR OFFICE/OUTPT VISIT, EST, LEVL IV, 30-39 MIN: ICD-10-PCS | Mod: S$PBB,,, | Performed by: FAMILY MEDICINE

## 2022-12-20 RX ORDER — HYDROCODONE BITARTRATE AND ACETAMINOPHEN 10; 325 MG/1; MG/1
1 TABLET ORAL 4 TIMES DAILY
Qty: 360 TABLET | Refills: 0 | Status: SHIPPED | OUTPATIENT
Start: 2022-12-20 | End: 2023-03-22 | Stop reason: SDUPTHER

## 2022-12-20 RX ORDER — ALOGLIPTIN 25 MG/1
25 TABLET, FILM COATED ORAL DAILY
Qty: 90 TABLET | Refills: 4 | Status: SHIPPED | OUTPATIENT
Start: 2022-12-20
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
## 2022-12-20 NOTE — PATIENT INSTRUCTIONS
Follow up in about 3 months (around 3/20/2023) for med refill.     Dear patient,   As a result of recent federal legislation (The Federal Cures Act), you may receive lab or pathology results from your visit in your [facility] account before your physician is able to contact you. Your physician or their representative will relay the results to you with their recommendations at their soonest availability.     If no improvement in symptoms or symptoms worsen, please be advised to call MD, follow-up at clinic and/or go to ER if becomes severe.    Eliud King M.D.        We Offer TELEHEALTH & Same Day Appointments!   Book your Telehealth appointment with me through my nurse or   Clinic appointments on [employer]!    [address] [place] [address]    Office: [phone]   FAX: [phone]    Check out my Facebook Page and Follow Me at: [email or web address]    Check out my website at [employer] by clicking on: [email or web address]    To Schedule appointments online, go to [email or web address]: [email or web address]

## 2023-01-25 ENCOUNTER — TELEPHONE (OUTPATIENT)
Dept: FAMILY MEDICINE | Facility: CLINIC | Age: 68
End: 2023-01-25
Payer: MEDICARE

## 2023-01-25 ENCOUNTER — PATIENT MESSAGE (OUTPATIENT)
Dept: ADMINISTRATIVE | Facility: HOSPITAL | Age: 68
End: 2023-01-25
Payer: MEDICARE

## 2023-03-22 ENCOUNTER — OFFICE VISIT (OUTPATIENT)
Dept: FAMILY MEDICINE | Facility: CLINIC | Age: 68
End: 2023-03-22
Payer: MEDICARE

## 2023-03-22 VITALS
OXYGEN SATURATION: 98 % | SYSTOLIC BLOOD PRESSURE: 116 MMHG | DIASTOLIC BLOOD PRESSURE: 74 MMHG | HEIGHT: 72 IN | WEIGHT: 231 LBS | HEART RATE: 62 BPM | BODY MASS INDEX: 31.29 KG/M2

## 2023-03-22 DIAGNOSIS — Z79.899 ENCOUNTER FOR LONG-TERM (CURRENT) USE OF MEDICATIONS: ICD-10-CM

## 2023-03-22 DIAGNOSIS — M54.12 CERVICAL RADICULOPATHY: ICD-10-CM

## 2023-03-22 DIAGNOSIS — M51.36 DDD (DEGENERATIVE DISC DISEASE), LUMBAR: ICD-10-CM

## 2023-03-22 PROCEDURE — 99999 PR PBB SHADOW E&M-EST. PATIENT-LVL V: CPT | Mod: PBBFAC,,, | Performed by: NURSE PRACTITIONER

## 2023-03-22 PROCEDURE — 99213 PR OFFICE/OUTPT VISIT, EST, LEVL III, 20-29 MIN: ICD-10-PCS | Mod: S$PBB,,, | Performed by: NURSE PRACTITIONER

## 2023-03-22 PROCEDURE — 99215 OFFICE O/P EST HI 40 MIN: CPT | Mod: PBBFAC,PO | Performed by: NURSE PRACTITIONER

## 2023-03-22 PROCEDURE — 99213 OFFICE O/P EST LOW 20 MIN: CPT | Mod: S$PBB,,, | Performed by: NURSE PRACTITIONER

## 2023-03-22 PROCEDURE — 99999 PR PBB SHADOW E&M-EST. PATIENT-LVL V: ICD-10-PCS | Mod: PBBFAC,,, | Performed by: NURSE PRACTITIONER

## 2023-03-22 RX ORDER — HYDROCORTISONE 25 MG/G
CREAM TOPICAL
COMMUNITY
Start: 2023-02-24

## 2023-03-22 RX ORDER — MICONAZOLE NITRATE 2 %
POWDER (GRAM) TOPICAL
COMMUNITY
Start: 2023-02-24

## 2023-03-22 RX ORDER — HYDROCODONE BITARTRATE AND ACETAMINOPHEN 10; 325 MG/1; MG/1
1 TABLET ORAL 4 TIMES DAILY
Qty: 360 TABLET | Refills: 0 | Status: SHIPPED | OUTPATIENT
Start: 2023-04-04 | End: 2023-06-30 | Stop reason: SDUPTHER

## 2023-03-22 RX ORDER — CLOBETASOL PROPIONATE 0.5 MG/G
OINTMENT TOPICAL
COMMUNITY
Start: 2023-02-24

## 2023-03-22 RX ORDER — TERBINAFINE HYDROCHLORIDE 250 MG/1
250 TABLET ORAL
COMMUNITY
Start: 2023-03-07

## 2023-03-22 RX ORDER — HYDROCODONE BITARTRATE AND ACETAMINOPHEN 10; 325 MG/1; MG/1
1 TABLET ORAL 4 TIMES DAILY
Qty: 360 TABLET | Refills: 0 | Status: CANCELLED | OUTPATIENT
Start: 2023-03-22

## 2023-03-22 RX ORDER — PRAMOXINE HYDROCHLORIDE 10 MG/ML
LOTION TOPICAL
COMMUNITY
Start: 2023-02-24

## 2023-03-22 NOTE — ASSESSMENT & PLAN NOTE
Continue current medication regimen. Follow-up with pain management for re-evaluation and to update plan with interventional pain techniques.
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
## 2023-03-22 NOTE — ASSESSMENT & PLAN NOTE
Chronic. Stable. Taking hydrocodone as prescribed with relief reported. Tolerating medication well with no SE reported. Requesting refills. Continue pain medication as prescribed per PCP. RTC in 3 months for re-evaluation. Plan was disucssed with Dr. King who was immediately available in clinic. The patient was checked in the [facility] Board of Pharmacy's  Prescription Monitoring Program. There appears to be no incongruities with the patient's verbalized history.  If no improvement with pain medications patient will be referred to pain management for further evaluation.    Previous plan: An opioid pain contract was completed   after having an extensive conversation about chronic opioid use for pain management. We discussed the risks and benefits of opioids.  I discouraged the patient from escalation of opioid use and try to minimize its use to decrease chance of dependence, tolerance, and opioid-based hyperalgesia.  I reviewed the  in great detail and there are no inconsistencies and it is appropriate with patient's history.  There are no signs of aberrant drug behavior.  The opioid risk tool was also completed, low risk.  Pt counseled about the side effects of long term use of opioids including dependence, tolerance, addiction, respiratory depression, somnolence, immune and endocrine dysfunction.  The patient expressed understanding.

## 2023-03-22 NOTE — ASSESSMENT & PLAN NOTE
[facility]. Complete history and physical was completed today.  Complete and thorough medication reconciliation was performed.  Discussed risks and benefits of medications.  Advised patient on orders and health maintenance.  We discussed old records and old labs if available.  Will request any records not available through epic.  Continue current medications listed on your summary sheet.

## 2023-03-23 ENCOUNTER — PATIENT MESSAGE (OUTPATIENT)
Dept: ADMINISTRATIVE | Facility: HOSPITAL | Age: 68
End: 2023-03-23
Payer: MEDICARE

## 2023-04-11 ENCOUNTER — TELEPHONE (OUTPATIENT)
Dept: ADMINISTRATIVE | Facility: HOSPITAL | Age: 68
End: 2023-04-11
Payer: MEDICARE

## 2023-04-14 ENCOUNTER — PATIENT OUTREACH (OUTPATIENT)
Dept: ADMINISTRATIVE | Facility: HOSPITAL | Age: 68
End: 2023-04-14
Payer: MEDICARE

## 2023-04-14 NOTE — PROGRESS NOTES
DM lab report: Manually uploaded Hemoglobin A1c to  from care everywhere. Per chart review, patient has an appointment [facility]/23 with [facility].

## 2023-06-14 DIAGNOSIS — E11.9 TYPE 2 DIABETES MELLITUS WITHOUT COMPLICATION: ICD-10-CM

## 2023-06-28 DIAGNOSIS — E78.5 HYPERLIPIDEMIA ASSOCIATED WITH TYPE 2 DIABETES MELLITUS: ICD-10-CM

## 2023-06-28 DIAGNOSIS — E11.69 HYPERLIPIDEMIA ASSOCIATED WITH TYPE 2 DIABETES MELLITUS: ICD-10-CM

## 2023-06-30 ENCOUNTER — OFFICE VISIT (OUTPATIENT)
Dept: FAMILY MEDICINE | Facility: CLINIC | Age: 68
End: 2023-06-30
Payer: MEDICARE

## 2023-06-30 VITALS
WEIGHT: 229.5 LBS | BODY MASS INDEX: 31.08 KG/M2 | OXYGEN SATURATION: 95 % | HEART RATE: 68 BPM | HEIGHT: 72 IN | DIASTOLIC BLOOD PRESSURE: 80 MMHG | SYSTOLIC BLOOD PRESSURE: 110 MMHG

## 2023-06-30 DIAGNOSIS — J42 CHRONIC BRONCHITIS, UNSPECIFIED CHRONIC BRONCHITIS TYPE: ICD-10-CM

## 2023-06-30 DIAGNOSIS — E11.65 TYPE 2 DIABETES MELLITUS WITH HYPERGLYCEMIA, WITHOUT LONG-TERM CURRENT USE OF INSULIN: ICD-10-CM

## 2023-06-30 DIAGNOSIS — Z79.899 ENCOUNTER FOR LONG-TERM (CURRENT) USE OF MEDICATIONS: ICD-10-CM

## 2023-06-30 DIAGNOSIS — M51.36 DDD (DEGENERATIVE DISC DISEASE), LUMBAR: Primary | ICD-10-CM

## 2023-06-30 DIAGNOSIS — M54.12 CERVICAL RADICULOPATHY: ICD-10-CM

## 2023-06-30 PROBLEM — D12.6 ADENOMATOUS POLYP OF COLON: Status: ACTIVE | Noted: 2022-10-01

## 2023-06-30 PROCEDURE — 99215 OFFICE O/P EST HI 40 MIN: CPT | Mod: PBBFAC,PO | Performed by: FAMILY MEDICINE

## 2023-06-30 PROCEDURE — 99214 PR OFFICE/OUTPT VISIT, EST, LEVL IV, 30-39 MIN: ICD-10-PCS | Mod: S$PBB,,, | Performed by: FAMILY MEDICINE

## 2023-06-30 PROCEDURE — 99999 PR PBB SHADOW E&M-EST. PATIENT-LVL V: ICD-10-PCS | Mod: PBBFAC,,, | Performed by: FAMILY MEDICINE

## 2023-06-30 PROCEDURE — 99999 PR PBB SHADOW E&M-EST. PATIENT-LVL V: CPT | Mod: PBBFAC,,, | Performed by: FAMILY MEDICINE

## 2023-06-30 PROCEDURE — 99214 OFFICE O/P EST MOD 30 MIN: CPT | Mod: S$PBB,,, | Performed by: FAMILY MEDICINE

## 2023-06-30 RX ORDER — HYDROCODONE BITARTRATE AND ACETAMINOPHEN 10; 325 MG/1; MG/1
1 TABLET ORAL 4 TIMES DAILY
Qty: 360 TABLET | Refills: 0 | Status: SHIPPED | OUTPATIENT
Start: 2023-06-30 | End: 2023-09-29 | Stop reason: SDUPTHER
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
## 2023-06-30 NOTE — PATIENT INSTRUCTIONS
Follow up in about 3 months (around 9/30/2023), or if symptoms worsen or fail to improve, for Med refills, with Mercy Avalos NP.     Dear patient,   As a result of recent federal legislation (The Federal Cures Act), you may receive lab or pathology results from your visit in your [facility] account before your physician is able to contact you. Your physician or their representative will relay the results to you with their recommendations at their soonest availability.     If no improvement in symptoms or symptoms worsen, please be advised to call MD, follow-up at clinic and/or go to ER if becomes severe.    Eliud King M.D.        We Offer TELEHEALTH & Same Day Appointments!   Book your Telehealth appointment with me through my nurse or   Clinic appointments on [facility]!    [address] [place] [address]    Office: [phone]   FAX: [phone]    Check out my Facebook Page and Follow Me at: [email or web address]    Check out my website at [facility] by clicking on: [email or web address]    To Schedule appointments online, go to [facility]: [email or web address]

## 2023-08-02 DIAGNOSIS — E11.9 TYPE 2 DIABETES MELLITUS WITHOUT COMPLICATION: ICD-10-CM

## 2023-09-29 ENCOUNTER — OFFICE VISIT (OUTPATIENT)
Dept: FAMILY MEDICINE | Facility: CLINIC | Age: 68
End: 2023-09-29
Payer: MEDICARE

## 2023-09-29 ENCOUNTER — PATIENT MESSAGE (OUTPATIENT)
Dept: ADMINISTRATIVE | Facility: HOSPITAL | Age: 68
End: 2023-09-29
Payer: MEDICARE

## 2023-09-29 VITALS
RESPIRATION RATE: 18 BRPM | WEIGHT: 228 LBS | HEIGHT: 72 IN | DIASTOLIC BLOOD PRESSURE: 68 MMHG | BODY MASS INDEX: 30.88 KG/M2 | SYSTOLIC BLOOD PRESSURE: 105 MMHG | HEART RATE: 74 BPM

## 2023-09-29 DIAGNOSIS — M54.12 CERVICAL RADICULOPATHY: ICD-10-CM

## 2023-09-29 DIAGNOSIS — Z23 IMMUNIZATION DUE: ICD-10-CM

## 2023-09-29 DIAGNOSIS — M51.36 DDD (DEGENERATIVE DISC DISEASE), LUMBAR: Primary | Chronic | ICD-10-CM

## 2023-09-29 DIAGNOSIS — Z79.899 ENCOUNTER FOR LONG-TERM (CURRENT) USE OF MEDICATIONS: ICD-10-CM

## 2023-09-29 DIAGNOSIS — R09.81 SINUS CONGESTION: ICD-10-CM

## 2023-09-29 PROCEDURE — 99999 PR PBB SHADOW E&M-EST. PATIENT-LVL V: ICD-10-PCS | Mod: PBBFAC,,, | Performed by: NURSE PRACTITIONER

## 2023-09-29 PROCEDURE — 99999 PR PBB SHADOW E&M-EST. PATIENT-LVL V: CPT | Mod: PBBFAC,,, | Performed by: NURSE PRACTITIONER

## 2023-09-29 PROCEDURE — G0008 ADMIN INFLUENZA VIRUS VAC: HCPCS | Mod: PBBFAC,PO

## 2023-09-29 PROCEDURE — 99215 OFFICE O/P EST HI 40 MIN: CPT | Mod: PBBFAC,PO,25 | Performed by: NURSE PRACTITIONER

## 2023-09-29 PROCEDURE — 99999PBSHW FLU VACCINE - QUADRIVALENT - ADJUVANTED: Mod: PBBFAC,,,

## 2023-09-29 PROCEDURE — 99214 OFFICE O/P EST MOD 30 MIN: CPT | Mod: S$PBB,,, | Performed by: NURSE PRACTITIONER

## 2023-09-29 PROCEDURE — 99214 PR OFFICE/OUTPT VISIT, EST, LEVL IV, 30-39 MIN: ICD-10-PCS | Mod: S$PBB,,, | Performed by: NURSE PRACTITIONER

## 2023-09-29 PROCEDURE — 99999PBSHW FLU VACCINE - QUADRIVALENT - ADJUVANTED: ICD-10-PCS | Mod: PBBFAC,,,

## 2023-09-29 RX ORDER — HYDROCODONE BITARTRATE AND ACETAMINOPHEN 10; 325 MG/1; MG/1
1 TABLET ORAL 4 TIMES DAILY
Qty: 360 TABLET | Refills: 0 | Status: SHIPPED | OUTPATIENT
Start: 2023-09-29 | End: 2023-10-02 | Stop reason: SDUPTHER

## 2023-09-29 RX ORDER — PREDNISONE 20 MG/1
40 TABLET ORAL DAILY
Qty: 10 TABLET | Refills: 0 | Status: SHIPPED | OUTPATIENT
Start: 2023-09-29 | End: 2023-10-04

## 2023-09-29 RX ORDER — CETIRIZINE HYDROCHLORIDE 10 MG/1
10 TABLET ORAL
COMMUNITY
Start: 2023-06-05

## 2023-09-29 RX ORDER — CHOLECALCIFEROL (VITAMIN D3) 50 MCG
50 TABLET ORAL
COMMUNITY
Start: 2023-06-05

## 2023-09-29 RX ORDER — FLUTICASONE PROPIONATE 50 MCG
SPRAY, SUSPENSION (ML) NASAL
COMMUNITY
Start: 2023-06-05 | End: 2024-01-10 | Stop reason: SDUPTHER

## 2023-09-29 NOTE — ASSESSMENT & PLAN NOTE
[facility] and plans to get copy of records for PCP. Complete history and physical was completed today.  Complete and thorough medication reconciliation was performed.  Discussed risks and benefits of medications.  Advised patient on orders and health maintenance.  We discussed old records and old labs if available.  Will request any records not available through epic.  Continue current medications listed on your summary sheet.
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
## 2023-09-29 NOTE — ASSESSMENT & PLAN NOTE
Continue pain medication as prescribed per PCP. RTC in 3 months for re-evaluation. Plan was disucssed with Dr. King who was immediately available in clinic. The patient was checked in the [facility] Board of Pharmacy's  Prescription Monitoring Program. There appears to be no incongruities with the patient's verbalized history.  If no improvement with pain medications patient will be referred to pain management for further evaluation.    Previous plan: An opioid pain contract was completed after having an extensive conversation about chronic opioid use for pain management. We discussed the risks and benefits of opioids.  I discouraged the patient from escalation of opioid use and try to minimize its use to decrease chance of dependence, tolerance, and opioid-based hyperalgesia.  I reviewed the  in great detail and there are no inconsistencies and it is appropriate with patient's history.  There are no signs of aberrant drug behavior.  The opioid risk tool was also completed, low risk.  Pt counseled about the side effects of long term use of opioids including dependence, tolerance, addiction, respiratory depression, somnolence, immune and endocrine dysfunction.  The patient expressed understanding.

## 2023-10-02 ENCOUNTER — TELEPHONE (OUTPATIENT)
Dept: FAMILY MEDICINE | Facility: CLINIC | Age: 68
End: 2023-10-02
Payer: MEDICARE

## 2023-10-02 DIAGNOSIS — Z79.899 ENCOUNTER FOR LONG-TERM (CURRENT) USE OF MEDICATIONS: ICD-10-CM

## 2023-10-02 DIAGNOSIS — M54.12 CERVICAL RADICULOPATHY: ICD-10-CM

## 2023-10-02 DIAGNOSIS — M51.36 DDD (DEGENERATIVE DISC DISEASE), LUMBAR: Chronic | ICD-10-CM

## 2023-10-02 NOTE — TELEPHONE ENCOUNTER
Care Due:                  Date            Visit Type   Department     Provider  --------------------------------------------------------------------------------                                EP -                              PRIMARY      [facility] FAM[facility]  Last Visit: 06-      CARE ([facility])   RHONA King                               -                              PRIMARY      [facility]  Next Visit: 01-      CARE ([facility])   MEDICINE       Eliud King                                                            Last  Test          Frequency    Reason                     Performed    Due Date  --------------------------------------------------------------------------------    CMP.........  12 months..  alogliptin,                06- 06-                             hydroCHLOROthiazide......    HBA1C.......  6 months...  alogliptin...............  11-   05-    [facility] Embedded Care Due Messages. Reference number: [id number].   10/02/2023 3:06:49 PM CDT

## 2023-10-02 NOTE — TELEPHONE ENCOUNTER
----- Message from Adriana Muñoz sent at 10/2/2023 11:38 AM CDT -----  Contact: [facility]ccloud [phone]  1MEDICALADVICE     Patient is calling for Medical Advice regarding:    How long has patient had these symptoms:    Pharmacy name and phone#:    Would like response via Recommendt: call back    Comments: Pt is requesting a call back from the nurse regarding a rx refill

## 2023-10-03 RX ORDER — HYDROCODONE BITARTRATE AND ACETAMINOPHEN 10; 325 MG/1; MG/1
1 TABLET ORAL 4 TIMES DAILY
Qty: 360 TABLET | Refills: 0 | Status: SHIPPED | OUTPATIENT
Start: 2023-10-03 | End: 2024-01-10 | Stop reason: SDUPTHER

## 2023-11-22 DIAGNOSIS — E11.9 TYPE 2 DIABETES MELLITUS WITHOUT COMPLICATION: ICD-10-CM

## 2024-01-10 ENCOUNTER — PATIENT MESSAGE (OUTPATIENT)
Dept: ADMINISTRATIVE | Facility: HOSPITAL | Age: 69
End: 2024-01-10
Payer: MEDICARE

## 2024-01-10 ENCOUNTER — OFFICE VISIT (OUTPATIENT)
Dept: FAMILY MEDICINE | Facility: CLINIC | Age: 69
End: 2024-01-10
Payer: MEDICARE

## 2024-01-10 VITALS
SYSTOLIC BLOOD PRESSURE: 114 MMHG | HEIGHT: 72 IN | DIASTOLIC BLOOD PRESSURE: 74 MMHG | BODY MASS INDEX: 30.34 KG/M2 | HEART RATE: 65 BPM | OXYGEN SATURATION: 95 % | WEIGHT: 224 LBS

## 2024-01-10 DIAGNOSIS — M54.12 CERVICAL RADICULOPATHY: ICD-10-CM

## 2024-01-10 DIAGNOSIS — J42 CHRONIC BRONCHITIS, UNSPECIFIED CHRONIC BRONCHITIS TYPE: ICD-10-CM

## 2024-01-10 DIAGNOSIS — I10 BENIGN ESSENTIAL HTN: ICD-10-CM

## 2024-01-10 DIAGNOSIS — Z79.899 ENCOUNTER FOR LONG-TERM (CURRENT) USE OF MEDICATIONS: ICD-10-CM

## 2024-01-10 DIAGNOSIS — J30.89 PERENNIAL ALLERGIC RHINITIS: ICD-10-CM

## 2024-01-10 DIAGNOSIS — E11.59 HYPERTENSION ASSOCIATED WITH DIABETES: ICD-10-CM

## 2024-01-10 DIAGNOSIS — E11.65 TYPE 2 DIABETES MELLITUS WITH HYPERGLYCEMIA, WITHOUT LONG-TERM CURRENT USE OF INSULIN: ICD-10-CM

## 2024-01-10 DIAGNOSIS — M51.36 DDD (DEGENERATIVE DISC DISEASE), LUMBAR: Chronic | ICD-10-CM

## 2024-01-10 DIAGNOSIS — I15.2 HYPERTENSION ASSOCIATED WITH DIABETES: ICD-10-CM

## 2024-01-10 PROCEDURE — 99999 PR PBB SHADOW E&M-EST. PATIENT-LVL V: CPT | Mod: PBBFAC,,, | Performed by: FAMILY MEDICINE

## 2024-01-10 PROCEDURE — 99214 OFFICE O/P EST MOD 30 MIN: CPT | Mod: S$PBB,,, | Performed by: FAMILY MEDICINE

## 2024-01-10 PROCEDURE — 99215 OFFICE O/P EST HI 40 MIN: CPT | Mod: PBBFAC,PO | Performed by: FAMILY MEDICINE

## 2024-01-10 RX ORDER — FLUTICASONE PROPIONATE 50 MCG
SPRAY, SUSPENSION (ML) NASAL
Qty: 16 G | Refills: 1
Start: 2024-01-10

## 2024-01-10 RX ORDER — HYDROCODONE BITARTRATE AND ACETAMINOPHEN 10; 325 MG/1; MG/1
1 TABLET ORAL 4 TIMES DAILY
Qty: 360 TABLET | Refills: 0 | Status: SHIPPED | OUTPATIENT
Start: 2024-01-10

## 2024-01-10 RX ORDER — HYDROCHLOROTHIAZIDE 25 MG/1
25 TABLET ORAL DAILY
Qty: 90 TABLET | Refills: 3 | Status: SHIPPED | OUTPATIENT
Start: 2024-01-10

## 2024-01-10 RX ORDER — PREDNISONE 20 MG/1
20 TABLET ORAL DAILY
Qty: 5 TABLET | Refills: 0 | Status: SHIPPED | OUTPATIENT
Start: 2024-01-10 | End: 2024-01-15

## 2024-01-10 RX ORDER — GUAIFENESIN 600 MG/1
1200 TABLET, EXTENDED RELEASE ORAL 2 TIMES DAILY
Qty: 30 TABLET | Refills: 0
Start: 2024-01-10

## 2024-01-10 RX ORDER — MONTELUKAST SODIUM 10 MG/1
10 TABLET ORAL NIGHTLY
Qty: 90 TABLET | Refills: 4 | Status: SHIPPED | OUTPATIENT
Start: 2024-01-10 | End: 2025-04-04

## 2024-01-10 NOTE — ASSESSMENT & PLAN NOTE
Restart Flonase and Mucinex.  Avoid triggers.  Follow-up sooner if no improvement.  Short prednisone burst.  Discussed condition course and signs and symptoms to expect.  Patient advised take anti-inflammatories and or Tylenol for pain or fever.  ER precautions.  Call MD or follow-up to clinic if not improving or worsening symptoms.   - risk of corticosteroids reviewed (elevated BP/glucose, insomnia, psychosis, bone loss, etc) and patient expressed understanding

## 2024-01-10 NOTE — PATIENT INSTRUCTIONS
Hi Don,     If you are due for any health screening(s) below please notify me so we can arrange them to be ordered and scheduled. Most healthy patients at your age complete them, but you are free to accept or refuse.     If you can't do it, I'll definitely understand. If you can, I'd certainly appreciate it!    All of your core healthy metrics are met.      Were here to help you quit smoking     Our records indicated that you are still smoking. One of the best things you can do for your health is to stop smoking and we are here to help.     Talk with your provider about our Smoking Cessation Program and how we can support you on your journey.

## 2024-01-10 NOTE — ASSESSMENT & PLAN NOTE
Continue current blood pressure medications.  Continue current medication regimen.  [facility].Counseled on importance of hypertension disease course, I recommend ongoing Education for DASH-diet and exercise.  Counseled on medication regimen importance of treating high blood pressure.  Please be advised of risk of untreated blood pressure as discussed.  Please advised of ER precautions were given for symptoms of hypertensive urgency and emergency.

## 2024-01-10 NOTE — PROGRESS NOTES
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 for environmental allergies and food allergies.   Neurological:  Negative for weakness and headaches.   Hematological:  Negative for adenopathy. Does not bruise/bleed easily.   Psychiatric/Behavioral:  Negative for sleep disturbance. The patient is not nervous/anxious.      Objective:   /74   Pulse 65   Ht 6' (1.829 m)   Wt 101.6 kg (224 lb)   SpO2 95%   BMI 30.38 kg/m²   Physical Exam  Vitals and nursing note reviewed.   Constitutional:       General: He is not in acute distress.     Appearance: He is well-developed.      Comments: Walks with a cane   HENT:      Head: Normocephalic and atraumatic.      Right Ear: Hearing, tympanic membrane, ear canal and external ear normal. No middle ear effusion. There is no impacted cerumen. Tympanic membrane is not injected, erythematous or bulging.      Left Ear: Hearing, tympanic membrane, ear canal and external ear normal.  No middle ear effusion. There is no impacted cerumen. Tympanic membrane is not injected, erythematous or bulging.      Nose: Congestion present. No rhinorrhea.       Mouth/Throat:      Mouth: Mucous membranes are moist. Mucous membranes are not pale.      Pharynx: Uvula midline. Posterior oropharyngeal erythema present. No oropharyngeal exudate.      Tonsils: No tonsillar exudate or tonsillar abscesses.   Eyes:      Extraocular Movements: Extraocular movements intact.      Pupils: Pupils are equal, round, and reactive to light.   Neck:      Vascular: No carotid bruit.   Cardiovascular:      Rate and Rhythm: Normal rate.      Pulses: Normal pulses.   Pulmonary:      Effort: Pulmonary effort is normal. No respiratory distress.      Breath sounds: Normal breath sounds.   Abdominal:      General: Bowel sounds are normal.      Palpations: Abdomen is soft.   Musculoskeletal:         General: Tenderness and deformity present. Normal range of motion.      Cervical back: Normal range of motion and neck supple.   Lymphadenopathy:      Cervical: No cervical adenopathy.   Skin:     General: Skin is warm and dry.      Capillary Refill: Capillary refill takes less than 2 seconds.   Neurological:      General: No focal deficit present.      Mental Status: He is alert and oriented to person, place, and time. Mental status is at baseline.      Cranial Nerves: No cranial nerve deficit.      Motor: No weakness.      Gait: Gait abnormal.   Psychiatric:         Mood and Affect: Mood normal.         Behavior: Behavior normal.     Wearing knee brace on the left leg.     Assessment:     1. DDD (degenerative disc disease), lumbar    2. Cervical radiculopathy    3. Encounter for long-term (current) use of medications    4. Benign essential HTN    5. Perennial allergic rhinitis    6. Chronic bronchitis, unspecified chronic bronchitis type    7. Hypertension associated with diabetes    8. Type 2 diabetes mellitus with hyperglycemia, without long-term current use of insulin      MDM:   Moderate  complexity.  Moderate risk.  Total time: 31 minutes.  This includes total time spent on the encounter, which  includes face to face time and non-face to face time preparing to see the patient (eg, review of previous medical records, tests), Obtaining and/or reviewing separately obtained history, documenting clinical information in the electronic or other health record, independently interpreting results (not separately reported)/communicating results to the patient/family/caregiver, and/or care coordination (not separately reported).    I have Reviewed and summarized old records.  I have performed thorough medication reconciliation today and discussed risk and benefits of medications.  I have reviewed labs and discussed with patient.  All questions were answered.  I am requesting old records and will review them once they are available. [facility]    I have signed for the following orders AND/OR meds.  No orders of the defined types were placed in this encounter.    Medications Ordered This Encounter   Medications    fluticasone propionate (FLONASE) 50 mcg/actuation nasal spray     Sig: INSTILL 2 SPRAYS IN EACH NOSTRIL TWICE A DAY FOR ALLERGIES     Dispense:  16 g     Refill:  1    guaiFENesin (MUCINEX) 600 mg 12 hr tablet     Sig: Take 2 tablets (1,200 mg total) by mouth 2 (two) times daily.     Dispense:  30 tablet     Refill:  0    hydroCHLOROthiazide (HYDRODIURIL) 25 MG tablet     Sig: Take 1 tablet (25 mg total) by mouth once daily.     Dispense:  90 tablet     Refill:  3     .    HYDROcodone-acetaminophen (NORCO)  mg per tablet     Sig: Take 1 tablet by mouth 4 (four) times daily.     Dispense:  360 tablet     Refill:  0     Quantity prescribed more than 7 day supply? Yes, quantity medically necessary    montelukast (SINGULAIR) 10 mg tablet     Sig: Take 1 tablet (10 mg total) by mouth every evening.     Dispense:  90 tablet     Refill:  4    predniSONE (DELTASONE) 20 MG tablet     Sig: Take 1 tablet (20 mg total) by mouth once daily. for 5 days     Dispense:  5 tablet     Refill:  0          Follow up in about 3 months  (around 4/10/2024), or if symptoms worsen or fail to improve, for Med refills.    If no improvement in symptoms or symptoms worsen, advised to call/follow-up at clinic or go to ER. Patient voiced understanding and all questions/concerns were addressed.   DISCLAIMER: This note was compiled by using a speech recognition dictation system and therefore please be aware that typographical / speech recognition errors can and do occur.  Please contact me if you see any errors specifically.    Eliud King M.D.       Office: [phone] [address] [place] [address]  FAX: [phone]

## 2024-01-11 DIAGNOSIS — Z00.00 ENCOUNTER FOR MEDICARE ANNUAL WELLNESS EXAM: ICD-10-CM

## 2024-04-03 NOTE — PROGRESS NOTES
Chart reviewed.   Requested updates from Care Everywhere.  Immunizations reconciled.   HM updated.     [FreeTextEntry1] : Patient is a 71-year-old female who presents for evaluation of acute low back pain.  Patient reports onset of pain approximately 1 week ago.  Precipitating event is believed to been an episode of severe coughing.  Patient reports since that time she has been experiencing low back pain primarily left-sided with radicular symptoms involving her left lower extremity.  Patient's past medical history is significant for lumbar HNP.  Patient reports prior to last week she does have a history of chronic intermittent low back pain however prior to last week she was doing well.  Patient reports low back pain is aggravated with sitting standing and ambulation she is currently using a straight cane for ambulation.  Denies bowel or bladder dysfunction. Patient speaks [place] her daughter is present during the interview and examination and serves as . Pain level at rest is 8 out 10  Pain level with physical activity is 9 out 10.

## 2024-04-10 ENCOUNTER — PATIENT MESSAGE (OUTPATIENT)
Dept: ADMINISTRATIVE | Facility: HOSPITAL | Age: 69
End: 2024-04-10
Payer: MEDICARE

## 2024-04-10 ENCOUNTER — OFFICE VISIT (OUTPATIENT)
Dept: FAMILY MEDICINE | Facility: CLINIC | Age: 69
End: 2024-04-10
Payer: MEDICARE

## 2024-04-10 ENCOUNTER — LAB VISIT (OUTPATIENT)
Dept: LAB | Facility: HOSPITAL | Age: 69
End: 2024-04-10
Attending: NURSE PRACTITIONER
Payer: MEDICARE

## 2024-04-10 VITALS
BODY MASS INDEX: 28.17 KG/M2 | OXYGEN SATURATION: 99 % | DIASTOLIC BLOOD PRESSURE: 66 MMHG | HEART RATE: 67 BPM | WEIGHT: 208 LBS | SYSTOLIC BLOOD PRESSURE: 110 MMHG | HEIGHT: 72 IN

## 2024-04-10 DIAGNOSIS — E78.5 HYPERLIPIDEMIA ASSOCIATED WITH TYPE 2 DIABETES MELLITUS: ICD-10-CM

## 2024-04-10 DIAGNOSIS — Z79.899 ENCOUNTER FOR LONG-TERM (CURRENT) USE OF MEDICATIONS: ICD-10-CM

## 2024-04-10 DIAGNOSIS — M54.12 CERVICAL RADICULOPATHY: ICD-10-CM

## 2024-04-10 DIAGNOSIS — Z12.5 SCREENING PSA (PROSTATE SPECIFIC ANTIGEN): ICD-10-CM

## 2024-04-10 DIAGNOSIS — I10 BENIGN ESSENTIAL HTN: Chronic | ICD-10-CM

## 2024-04-10 DIAGNOSIS — E11.69 HYPERLIPIDEMIA ASSOCIATED WITH TYPE 2 DIABETES MELLITUS: ICD-10-CM

## 2024-04-10 DIAGNOSIS — M51.36 DDD (DEGENERATIVE DISC DISEASE), LUMBAR: Primary | Chronic | ICD-10-CM

## 2024-04-10 DIAGNOSIS — M51.36 DDD (DEGENERATIVE DISC DISEASE), LUMBAR: Chronic | ICD-10-CM

## 2024-04-10 PROBLEM — B35.1 TINEA UNGUIUM: Status: RESOLVED | Noted: 2022-09-27 | Resolved: 2024-04-10

## 2024-04-10 PROBLEM — Q66.50 CONGENITAL PES PLANUS, UNSPECIFIED FOOT: Status: RESOLVED | Noted: 2022-09-27 | Resolved: 2024-04-10

## 2024-04-10 PROBLEM — M25.562 PAIN IN LEFT KNEE: Status: RESOLVED | Noted: 2022-09-27 | Resolved: 2024-04-10

## 2024-04-10 PROBLEM — R60.0 LOCALIZED EDEMA: Status: RESOLVED | Noted: 2022-09-27 | Resolved: 2024-04-10

## 2024-04-10 PROBLEM — Z48.02 ENCOUNTER FOR REMOVAL OF SUTURES: Status: RESOLVED | Noted: 2022-09-27 | Resolved: 2024-04-10

## 2024-04-10 LAB
ALBUMIN SERPL BCP-MCNC: 4.1 G/DL (ref 3.5–5.2)
ALP SERPL-CCNC: 79 U/L (ref 55–135)
ALT SERPL W/O P-5'-P-CCNC: 24 U/L (ref 10–44)
ANION GAP SERPL CALC-SCNC: 11 MMOL/L (ref 8–16)
AST SERPL-CCNC: 27 U/L (ref 10–40)
BILIRUB SERPL-MCNC: 0.7 MG/DL (ref 0.1–1)
BUN SERPL-MCNC: 25 MG/DL (ref 8–23)
CALCIUM SERPL-MCNC: 9.7 MG/DL (ref 8.7–10.5)
CHLORIDE SERPL-SCNC: 100 MMOL/L (ref 95–110)
CHOLEST SERPL-MCNC: 101 MG/DL (ref 120–199)
CHOLEST/HDLC SERPL: 3.2 {RATIO} (ref 2–5)
CO2 SERPL-SCNC: 28 MMOL/L (ref 23–29)
COMPLEXED PSA SERPL-MCNC: 0.81 NG/ML (ref 0–4)
CREAT SERPL-MCNC: 1.3 MG/DL (ref 0.5–1.4)
ERYTHROCYTE [DISTWIDTH] IN BLOOD BY AUTOMATED COUNT: 12.6 % (ref 11.5–14.5)
EST. GFR  (NO RACE VARIABLE): 59.1 ML/MIN/1.73 M^2
ESTIMATED AVG GLUCOSE: 171 MG/DL (ref 68–131)
GLUCOSE SERPL-MCNC: 179 MG/DL (ref 70–110)
HBA1C MFR BLD: 7.6 % (ref 4–5.6)
HCT VFR BLD AUTO: 52.4 % (ref 40–54)
HDLC SERPL-MCNC: 32 MG/DL (ref 40–75)
HDLC SERPL: 31.7 % (ref 20–50)
HGB BLD-MCNC: 17.1 G/DL (ref 14–18)
LDLC SERPL CALC-MCNC: 43 MG/DL (ref 63–159)
MCH RBC QN AUTO: 29.3 PG (ref 27–31)
MCHC RBC AUTO-ENTMCNC: 32.6 G/DL (ref 32–36)
MCV RBC AUTO: 90 FL (ref 82–98)
NONHDLC SERPL-MCNC: 69 MG/DL
PLATELET # BLD AUTO: 218 K/UL (ref 150–450)
PMV BLD AUTO: 11.1 FL (ref 9.2–12.9)
POTASSIUM SERPL-SCNC: 3.3 MMOL/L (ref 3.5–5.1)
PROT SERPL-MCNC: 7.9 G/DL (ref 6–8.4)
RBC # BLD AUTO: 5.83 M/UL (ref 4.6–6.2)
SODIUM SERPL-SCNC: 139 MMOL/L (ref 136–145)
TRIGL SERPL-MCNC: 130 MG/DL (ref 30–150)
TSH SERPL DL<=0.005 MIU/L-ACNC: 1 UIU/ML (ref 0.4–4)
WBC # BLD AUTO: 4.99 K/UL (ref 3.9–12.7)

## 2024-04-10 PROCEDURE — 36415 COLL VENOUS BLD VENIPUNCTURE: CPT | Mod: PO | Performed by: NURSE PRACTITIONER

## 2024-04-10 PROCEDURE — 80053 COMPREHEN METABOLIC PANEL: CPT | Performed by: NURSE PRACTITIONER

## 2024-04-10 PROCEDURE — 99214 OFFICE O/P EST MOD 30 MIN: CPT | Mod: S$PBB,,, | Performed by: NURSE PRACTITIONER

## 2024-04-10 PROCEDURE — 99999 PR PBB SHADOW E&M-EST. PATIENT-LVL V: CPT | Mod: PBBFAC,,, | Performed by: NURSE PRACTITIONER

## 2024-04-10 PROCEDURE — 83036 HEMOGLOBIN GLYCOSYLATED A1C: CPT | Performed by: NURSE PRACTITIONER

## 2024-04-10 PROCEDURE — 85027 COMPLETE CBC AUTOMATED: CPT | Performed by: NURSE PRACTITIONER

## 2024-04-10 PROCEDURE — 99215 OFFICE O/P EST HI 40 MIN: CPT | Mod: PBBFAC,PO | Performed by: NURSE PRACTITIONER

## 2024-04-10 PROCEDURE — 80061 LIPID PANEL: CPT | Performed by: NURSE PRACTITIONER

## 2024-04-10 PROCEDURE — 84153 ASSAY OF PSA TOTAL: CPT | Performed by: NURSE PRACTITIONER

## 2024-04-10 PROCEDURE — 84443 ASSAY THYROID STIM HORMONE: CPT | Performed by: NURSE PRACTITIONER

## 2024-04-10 RX ORDER — HYDROCODONE BITARTRATE AND ACETAMINOPHEN 10; 325 MG/1; MG/1
1 TABLET ORAL 4 TIMES DAILY
Qty: 360 TABLET | Refills: 0 | Status: SHIPPED | OUTPATIENT
Start: 2024-04-10

## 2024-04-10 RX ORDER — HYDROCODONE BITARTRATE AND ACETAMINOPHEN 10; 325 MG/1; MG/1
1 TABLET ORAL 4 TIMES DAILY
Qty: 360 TABLET | Refills: 0 | Status: CANCELLED | OUTPATIENT
Start: 2024-04-10

## 2024-04-10 NOTE — PROGRESS NOTES
Patient requests pain medication refills. Please see recent encounter for details. See  below.
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
## 2024-04-10 NOTE — ASSESSMENT & PLAN NOTE
Continue current blood pressure medications.  Continue current medication regimen.  [facility] Counseled on importance of hypertension disease course, I recommend ongoing Education for DASH-diet and exercise. Counseled on medication regimen importance of treating high blood pressure. Please be advised of risk of untreated blood pressure as discussed. Please advised of ER precautions were given for symptoms of hypertensive urgency and emergency.

## 2024-04-10 NOTE — ASSESSMENT & PLAN NOTE
Taking hydrocodone as prescribed. Tolerated well. No SE reported. Continue pain medication as prescribed per PCP. RTC in 3 months for re-evaluation. Plan was disucssed with Dr. King who was immediately available in clinic. The patient was checked in the [facility] [employer] [facility] Pharmacy's  Prescription Monitoring Program. There appears to be no incongruities with the patient's verbalized history.  If no improvement with pain medications patient will be referred to pain management for further evaluation.     Previous plan: An opioid pain contract was completed   after having an extensive conversation about chronic opioid use for pain management. We discussed the risks and benefits of opioids.  I discouraged the patient from escalation of opioid use and try to minimize its use to decrease chance of dependence, tolerance, and opioid-based hyperalgesia.  I reviewed the  in great detail and there are no inconsistencies and it is appropriate with patient's history.  There are no signs of aberrant drug behavior.  The opioid risk tool was also completed, low risk.  Pt counseled about the side effects of long term use of opioids including dependence, tolerance, addiction, respiratory depression, somnolence, immune and endocrine dysfunction.  The patient expressed understanding.

## 2024-04-10 NOTE — ASSESSMENT & PLAN NOTE
Update fasting lipid panel. Counseled on hyperlipidemia disease course, healthy diet and increased need for exercise. Please be advised of the risk of cardiovascular disease, increase stroke and heart attack risk with uncontrolled/untreated hyperlipidemia. Patient voiced understanding and understood the treatment plan. All questions were answered.

## 2024-04-11 ENCOUNTER — TELEPHONE (OUTPATIENT)
Dept: FAMILY MEDICINE | Facility: CLINIC | Age: 69
End: 2024-04-11
Payer: MEDICARE

## 2024-04-11 NOTE — TELEPHONE ENCOUNTER
----- Message from Eloy Perez sent at 4/11/2024 10:07 AM CDT -----  Contact: [phone]  Patient called in after missing a call from your office , please call back [phone]

## 2024-04-11 NOTE — TELEPHONE ENCOUNTER
----- Message from [place] sent at 4/11/2024 10:43 AM CDT -----  Contact: Migue Camarena is calling to speak to the nurse regarding a missed call, he stated its concerning his lab results from yesterday visit on 04/10, please give him a call at       Thanks  LJ

## 2024-05-18 ENCOUNTER — PATIENT MESSAGE (OUTPATIENT)
Dept: FAMILY MEDICINE | Facility: CLINIC | Age: 69
End: 2024-05-18
Payer: MEDICARE

## 2024-07-10 ENCOUNTER — OFFICE VISIT (OUTPATIENT)
Dept: FAMILY MEDICINE | Facility: CLINIC | Age: 69
End: 2024-07-10
Payer: MEDICARE

## 2024-07-10 VITALS
RESPIRATION RATE: 17 BRPM | DIASTOLIC BLOOD PRESSURE: 62 MMHG | HEART RATE: 70 BPM | TEMPERATURE: 98 F | WEIGHT: 215.63 LBS | OXYGEN SATURATION: 96 % | BODY MASS INDEX: 29.21 KG/M2 | HEIGHT: 72 IN | SYSTOLIC BLOOD PRESSURE: 110 MMHG

## 2024-07-10 DIAGNOSIS — M51.36 DDD (DEGENERATIVE DISC DISEASE), LUMBAR: Primary | Chronic | ICD-10-CM

## 2024-07-10 DIAGNOSIS — Z79.899 ENCOUNTER FOR LONG-TERM (CURRENT) USE OF MEDICATIONS: ICD-10-CM

## 2024-07-10 DIAGNOSIS — M51.36 DDD (DEGENERATIVE DISC DISEASE), LUMBAR: Chronic | ICD-10-CM

## 2024-07-10 DIAGNOSIS — Z79.899 ENCOUNTER FOR LONG-TERM (CURRENT) USE OF MEDICATIONS: Chronic | ICD-10-CM

## 2024-07-10 DIAGNOSIS — M54.12 CERVICAL RADICULOPATHY: ICD-10-CM

## 2024-07-10 DIAGNOSIS — M54.12 CERVICAL RADICULOPATHY: Chronic | ICD-10-CM

## 2024-07-10 PROCEDURE — 99215 OFFICE O/P EST HI 40 MIN: CPT | Mod: PBBFAC,PO | Performed by: NURSE PRACTITIONER

## 2024-07-10 PROCEDURE — 99214 OFFICE O/P EST MOD 30 MIN: CPT | Mod: S$PBB,,, | Performed by: NURSE PRACTITIONER

## 2024-07-10 PROCEDURE — 99999 PR PBB SHADOW E&M-EST. PATIENT-LVL V: CPT | Mod: PBBFAC,,, | Performed by: NURSE PRACTITIONER

## 2024-07-10 RX ORDER — HYDROCODONE BITARTRATE AND ACETAMINOPHEN 10; 325 MG/1; MG/1
1 TABLET ORAL EVERY 8 HOURS PRN
Qty: 270 TABLET | Refills: 0 | Status: SHIPPED | OUTPATIENT
Start: 2024-07-10
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
## 2024-07-10 NOTE — ASSESSMENT & PLAN NOTE
Discussed with patient that I recommend further evaluation by pain management for further evaluation of his chronic pain and management of his pain medication. He has been referred to Dr. Tereso Asif and is agreeable. Continue pain medication as prescribed per PCP refill request sent to PCP. RTC in 3 months for re-evaluation. Plan was disucssed with Dr. King who was immediately available in clinic. The patient was checked in the [facility] Board of Pharmacy's  Prescription Monitoring Program. There appears to be no incongruities with the patient's verbalized history.  If no improvement with pain medications patient will be referred to pain management for further evaluation.     Previous plan: An opioid pain contract was completed   after having an extensive conversation about chronic opioid use for pain management. We discussed the risks and benefits of opioids.  I discouraged the patient from escalation of opioid use and try to minimize its use to decrease chance of dependence, tolerance, and opioid-based hyperalgesia.  I reviewed the  in great detail and there are no inconsistencies and it is appropriate with patient's history.  There are no signs of aberrant drug behavior.  The opioid risk tool was also completed, low risk.  Pt counseled about the side effects of long term use of opioids including dependence, tolerance, addiction, respiratory depression, somnolence, immune and endocrine dysfunction.  The patient expressed understanding.

## 2024-07-10 NOTE — PROGRESS NOTES
Patient requests pain medication refills. He is taking hydrocodone 3-4 times daily as needed and has been getting a 90 day supply. He has been referred to pain management. He has seen me for the last x2 encounters and was advised he will have to follow up with PCP in x3 months. Please see recent encounter for details. See  below.

## 2024-07-10 NOTE — ASSESSMENT & PLAN NOTE
Complete history and physical was completed today.  Complete and thorough medication reconciliation was performed.  Discussed risks and benefits of medications.  Advised patient on orders and health maintenance.  We discussed old records and old labs if available.  Will request any records not available through epic.  Continue current medications listed on your summary sheet.

## 2024-08-09 ENCOUNTER — HOSPITAL ENCOUNTER (OUTPATIENT)
Dept: RADIOLOGY | Facility: HOSPITAL | Age: 69
Discharge: HOME | End: 2024-08-09
Attending: STUDENT IN AN ORGANIZED HEALTH CARE EDUCATION/TRAINING PROGRAM
Payer: MEDICARE

## 2024-08-09 DIAGNOSIS — M54.16 RADICULOPATHY, LUMBAR REGION: ICD-10-CM

## 2024-08-09 DIAGNOSIS — M54.12 RADICULOPATHY, CERVICAL REGION: ICD-10-CM

## 2024-08-09 PROCEDURE — 72141 MRI NECK SPINE W/O DYE: CPT | Mod: 26,,, | Performed by: RADIOLOGY

## 2024-08-09 PROCEDURE — 72100 X-RAY EXAM L-S SPINE 2/3 VWS: CPT | Mod: 26,,, | Performed by: RADIOLOGY

## 2024-08-09 PROCEDURE — 72148 MRI LUMBAR SPINE W/O DYE: CPT | Mod: 26,,, | Performed by: RADIOLOGY

## 2024-08-09 PROCEDURE — 72040 X-RAY EXAM NECK SPINE 2-3 VW: CPT | Mod: TC,PN

## 2024-08-09 PROCEDURE — 72141 MRI NECK SPINE W/O DYE: CPT | Mod: TC,PN

## 2024-08-09 PROCEDURE — 72148 MRI LUMBAR SPINE W/O DYE: CPT | Mod: TC,PN

## 2024-08-09 PROCEDURE — 72100 X-RAY EXAM L-S SPINE 2/3 VWS: CPT | Mod: TC,PN

## 2024-08-09 PROCEDURE — 72040 X-RAY EXAM NECK SPINE 2-3 VW: CPT | Mod: 26,,, | Performed by: RADIOLOGY

## 2024-09-11 ENCOUNTER — PATIENT MESSAGE (OUTPATIENT)
Dept: FAMILY MEDICINE | Facility: CLINIC | Age: 69
End: 2024-09-11
Payer: MEDICARE

## 2024-10-16 DIAGNOSIS — E11.9 TYPE 2 DIABETES MELLITUS WITHOUT COMPLICATION: ICD-10-CM

## 2025-01-10 ENCOUNTER — OFFICE VISIT (OUTPATIENT)
Dept: FAMILY MEDICINE | Facility: CLINIC | Age: 70
End: 2025-01-10
Payer: MEDICARE

## 2025-01-10 VITALS
BODY MASS INDEX: 28.71 KG/M2 | SYSTOLIC BLOOD PRESSURE: 132 MMHG | HEART RATE: 65 BPM | DIASTOLIC BLOOD PRESSURE: 74 MMHG | HEIGHT: 72 IN | OXYGEN SATURATION: 96 % | WEIGHT: 212 LBS | RESPIRATION RATE: 17 BRPM

## 2025-01-10 DIAGNOSIS — M51.362 DEGENERATION OF INTERVERTEBRAL DISC OF LUMBAR REGION WITH DISCOGENIC BACK PAIN AND LOWER EXTREMITY PAIN: ICD-10-CM

## 2025-01-10 DIAGNOSIS — J30.89 PERENNIAL ALLERGIC RHINITIS: ICD-10-CM

## 2025-01-10 DIAGNOSIS — E11.59 HYPERTENSION ASSOCIATED WITH DIABETES: Primary | ICD-10-CM

## 2025-01-10 DIAGNOSIS — J42 CHRONIC BRONCHITIS, UNSPECIFIED CHRONIC BRONCHITIS TYPE: ICD-10-CM

## 2025-01-10 DIAGNOSIS — E11.69 HYPERLIPIDEMIA ASSOCIATED WITH TYPE 2 DIABETES MELLITUS: ICD-10-CM

## 2025-01-10 DIAGNOSIS — I15.2 HYPERTENSION ASSOCIATED WITH DIABETES: Primary | ICD-10-CM

## 2025-01-10 DIAGNOSIS — Z79.899 ENCOUNTER FOR LONG-TERM (CURRENT) USE OF MEDICATIONS: ICD-10-CM

## 2025-01-10 DIAGNOSIS — E78.5 HYPERLIPIDEMIA ASSOCIATED WITH TYPE 2 DIABETES MELLITUS: ICD-10-CM

## 2025-01-10 DIAGNOSIS — M54.12 CERVICAL RADICULOPATHY: ICD-10-CM

## 2025-01-10 DIAGNOSIS — E11.65 TYPE 2 DIABETES MELLITUS WITH HYPERGLYCEMIA, WITHOUT LONG-TERM CURRENT USE OF INSULIN: ICD-10-CM

## 2025-01-10 PROCEDURE — 99999 PR PBB SHADOW E&M-EST. PATIENT-LVL V: CPT | Mod: PBBFAC,,, | Performed by: FAMILY MEDICINE

## 2025-01-10 PROCEDURE — 99215 OFFICE O/P EST HI 40 MIN: CPT | Mod: PBBFAC,PO | Performed by: FAMILY MEDICINE

## 2025-01-10 RX ORDER — MONTELUKAST SODIUM 10 MG/1
10 TABLET ORAL NIGHTLY
Qty: 90 TABLET | Refills: 4 | Status: SHIPPED | OUTPATIENT
Start: 2025-01-10 | End: 2026-04-05

## 2025-01-10 RX ORDER — HYDROCODONE BITARTRATE AND ACETAMINOPHEN 10; 325 MG/1; MG/1
1 TABLET ORAL EVERY 8 HOURS PRN
Qty: 270 TABLET | Refills: 0 | Status: SHIPPED | OUTPATIENT
Start: 2025-01-10

## 2025-01-10 RX ORDER — UREA 200 MG/G
20 CREAM TOPICAL 2 TIMES DAILY
COMMUNITY
Start: 2024-10-25

## 2025-01-10 RX ORDER — ACETAMINOPHEN 500 MG
1000 TABLET ORAL 3 TIMES DAILY PRN
COMMUNITY
Start: 2024-04-21

## 2025-01-10 NOTE — PROGRESS NOTES
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 take anti-inflammatories and or Tylenol for pain or fever.  ER precautions.  Call MD or follow-up to clinic if not improving or worsening symptoms.   - risk of corticosteroids reviewed (elevated BP/glucose, insomnia, psychosis, bone loss, etc) and patient expressed understanding           Chronic bronchitis (Chronic)    Overview     Chronic. Doing well on symbicort and albuterol. No issues.          Current Assessment & Plan     Continue current regimen.  Bronchitis precautions.  Follow-up with Pulmonary.             Review of patient's allergies indicates:   Allergen Reactions    Amlodipine Swelling    Ace inhibitors Swelling     Other reaction(s): Impaired Renal Function, Renal impairment     Current Outpatient Medications   Medication Instructions    acetaminophen (TYLENOL) 1,000 mg, 3 times daily PRN    albuterol 90 mcg/actuation inhaler 2 puffs, Inhalation, Every 6 hours PRN    alogliptin (NESINA) 25 mg, Oral, Daily    ALPRAZolam (XANAX) 1 mg, 2 times daily    aspirin 81 mg, Oral, Daily    atenoloL (TENORMIN) 12.5 mg, Oral, Daily    brimonidine 0.1% (ALPHAGAN P) 0.1 % Drop 1 drop, 3 times daily    capsaicin 0.1 % Crea Apply topically.    cetirizine  (ZYRTEC) 10 mg    cholecalciferol (vitamin D3) (VITAMIN D3) 50 mcg    clindamycin (CLEOCIN T) 1 % Swab 2 times daily    clobetasol 0.05% (TEMOVATE) 0.05 % Oint APPLY SMALL AMOUNT TOPICALLY TWICE A DAY USE TWICE A DAY TO ELBOWS AND L HAND FOR 2 WEEKS, THEN TAKE 1 WEEK BREAK  AND REPEAT IF RASH PERSISTS USE TWICE A DAY TO ELBOWS AND L HAND FOR 2 WEEKS, THEN TAKE 1 WEEK BREAK  AND REPEAT IF RASH PERSISTS    diclofenac sodium (VOLTAREN) 2 g, Daily    diltiaZEM (DILACOR XR) 180 mg, Oral, 2 times daily    dorzolamide HCl/timolol maleat (DORZOLAMIDE-TIMOLOL OPHT) Apply to eye. Instill 1 drop in each eye twice a day for glaucoma.    fluticasone propionate (FLONASE) 50 mcg/actuation nasal spray INSTILL 2 SPRAYS IN EACH NOSTRIL TWICE A DAY FOR ALLERGIES    fluticasone-salmeterol diskus inhaler 250-50 mcg 1 puff, 2 times daily    gabapentin (NEURONTIN) 600 mg, Oral, 3 times daily    guaiFENesin (MUCINEX) 1,200 mg, Oral, 2 times daily    hydroCHLOROthiazide (HYDRODIURIL) 25 mg, Oral, Daily    HYDROcodone-acetaminophen (NORCO)  mg per tablet 1 tablet, Oral, Every 8 hours PRN    hydrocortisone 2.5 % cream APPLY MODERATE AMOUNT TOPICALLY TWICE A DAY APPLY TWICE EVERY DAY TO FACE AND GROIN FOR UP TO 2 WEEKS THEN TAKE 1 WEEK  BREAK BEFORE REPEATING IF NEEDED APPLY TWICE EVERY DAY TO FACE AND GROIN FOR UP TO 2 WEEKS THEN TAKE 1 WEEK   BREAK BEFORE REPEATING IF NEEDED    ketoconazole (NIZORAL) 2 % shampoo Twice weekly    latanoprost 0.005 % ophthalmic solution 1 drop, Nightly    LIDOcaine (LIDODERM) 5 % 1 patch, Every 24 hours (non-standard times)    loratadine (CLARITIN) 10 mg, Oral, Daily    LUBRICANT EYE, POLYV ALCOHOL, 1.4 % ophthalmic solution No dose, route, or frequency recorded.    methocarbamoL (ROBAXIN) 750 mg, 4 times daily    miconazole NITRATE 2 % (MICOTIN) 2 % top powder APPLY MODERATE AMOUNT TOPICALLY ONCE DAILY FOR RASH APPLY EVERY DAY TO FEET AND GROIN    montelukast (SINGULAIR) 10 mg, Oral, Nightly    mupirocin  (BACTROBAN) 2 % ointment Topical (Top), 3 times daily    potassium chloride (KLOR-CON) 10 MEQ TbSR 20 mEq, Oral, 2 times daily    pramoxine 1 % Lotn APPLY MODERATE AMOUNT TOPICALLY AS DIRECTED AS NEEDED FOR ITCHING APPLY AS NEEDED FOR ITCHY SPOTS ON SKIN    prazosin (MINIPRESS) 2 mg, 2 times daily    rosuvastatin (CRESTOR) 40 mg, Daily    SYMBICORT 80-4.5 mcg/actuation HFAA 2 puffs, Daily PRN    terbinafine HCL (LAMISIL) 250 mg    travoprost, benzalkonium, (TRAVATAN) 0.004 % ophthalmic solution 1 drop, Both Eyes, Nightly    traZODone (DESYREL) 100 mg, Nightly    triamcinolone acetonide 0.1% (KENALOG) 0.1 % cream 2 times daily    UREA-HYDROPHILIC CREAM TOP Apply topically.    urea 20 %, 2 times daily      I have reviewed the PMH, social history, FamilyHx, surgical history, allergies and medications documented / confirmed by the patient at the time of this visit.  Review of Systems   Constitutional:  Negative for chills, fatigue, fever and unexpected weight change.   HENT:  Positive for congestion, postnasal drip and sinus pressure. Negative for ear pain, sinus pain and sore throat.    Eyes:  Negative for redness and visual disturbance.   Respiratory:  Negative for cough and shortness of breath.    Cardiovascular:  Negative for chest pain and palpitations.   Gastrointestinal:  Negative for nausea and vomiting.   Endocrine: Negative for cold intolerance and heat intolerance.   Genitourinary:  Negative for difficulty urinating and hematuria.   Musculoskeletal:  Positive for arthralgias, back pain and gait problem. Negative for myalgias.   Skin:  Negative for rash and wound.   Allergic/Immunologic: Negative for environmental allergies and food allergies.   Neurological:  Negative for weakness and headaches.   Hematological:  Negative for adenopathy. Does not bruise/bleed easily.   Psychiatric/Behavioral:  Negative for sleep disturbance. The patient is not nervous/anxious.      Objective:   /74   Pulse 65   Resp  17   Ht 6' (1.829 m)   Wt 96.2 kg (212 lb)   SpO2 96%   BMI 28.75 kg/m²   Physical Exam  Vitals and nursing note reviewed.   Constitutional:       General: He is not in acute distress.     Appearance: He is well-developed.      Comments: Walks with a cane   HENT:      Head: Normocephalic and atraumatic.      Right Ear: Hearing, tympanic membrane, ear canal and external ear normal. No middle ear effusion. There is no impacted cerumen. Tympanic membrane is not injected, erythematous or bulging.      Left Ear: Hearing, tympanic membrane, ear canal and external ear normal.  No middle ear effusion. There is no impacted cerumen. Tympanic membrane is not injected, erythematous or bulging.      Nose: Congestion present. No rhinorrhea.      Mouth/Throat:      Mouth: Mucous membranes are moist. Mucous membranes are not pale.      Pharynx: Uvula midline. No oropharyngeal exudate or posterior oropharyngeal erythema.      Tonsils: No tonsillar exudate or tonsillar abscesses.   Eyes:      Extraocular Movements: Extraocular movements intact.      Pupils: Pupils are equal, round, and reactive to light.   Neck:      Vascular: No carotid bruit.   Cardiovascular:      Rate and Rhythm: Normal rate.      Pulses: Normal pulses.   Pulmonary:      Effort: Pulmonary effort is normal. No respiratory distress.      Breath sounds: Normal breath sounds.   Abdominal:      General: Bowel sounds are normal.      Palpations: Abdomen is soft.   Musculoskeletal:         General: Tenderness and deformity present. Normal range of motion.      Cervical back: Normal range of motion and neck supple.   Lymphadenopathy:      Cervical: No cervical adenopathy.   Skin:     General: Skin is warm and dry.      Capillary Refill: Capillary refill takes less than 2 seconds.   Neurological:      General: No focal deficit present.      Mental Status: He is alert and oriented to person, place, and time. Mental status is at baseline.      Cranial Nerves: No cranial  nerve deficit.      Motor: No weakness.      Gait: Gait abnormal.   Psychiatric:         Mood and Affect: Mood normal.         Behavior: Behavior normal.       Physical Exam  Oral exam was performed. There is no infection, but there is inflammation.  Lungs were auscultated.    Vital Signs  Blood pressure is normal.    Results  Laboratory Studies  A1c was 115.    Assessment:     1. Hypertension associated with diabetes    2. Degeneration of intervertebral disc of lumbar region with discogenic back pain and lower extremity pain    3. Cervical radiculopathy    4. Encounter for long-term (current) use of medications    5. Chronic bronchitis, unspecified chronic bronchitis type    6. Hyperlipidemia associated with type 2 diabetes mellitus    7. Type 2 diabetes mellitus with hyperglycemia, without long-term current use of insulin    8. Perennial allergic rhinitis      MDM:   Moderate medical complexity.  Moderate risk.  Total time: 31 minutes.  This includes total time spent on the encounter, which includes face to face time and non-face to face time preparing to see the patient (eg, review of previous medical records, tests), Obtaining and/or reviewing separately obtained history, documenting clinical information in the electronic or other health record, independently interpreting results (not separately reported)/communicating results to the patient/family/caregiver, and/or care coordination (not separately reported).    I have Reviewed and summarized old records.  I have performed thorough medication reconciliation today and discussed risk and benefits of medications.  I have reviewed labs and discussed with patient.  All questions were answered.  I am requesting old records and will review them once they are available.  Visit today included increased complexity associated with the care of the episodic problem see above assessment addressed and managing the longitudinal care of the patient due to the serious and/or  complex managed problem(s) see above.    I have signed for the following orders AND/OR meds.  Orders Placed This Encounter   Procedures    CBC Without Differential     Standing Status:   Future     Number of Occurrences:   1     Standing Expiration Date:   3/11/2026    Comprehensive Metabolic Panel     Standing Status:   Future     Number of Occurrences:   1     Standing Expiration Date:   3/11/2026    TSH     Standing Status:   Future     Number of Occurrences:   1     Standing Expiration Date:   3/11/2026    Hemoglobin A1C     Standing Status:   Future     Number of Occurrences:   1     Standing Expiration Date:   3/11/2026    Lipid Panel     Standing Status:   Future     Number of Occurrences:   1     Standing Expiration Date:   3/11/2026     Medications Ordered This Encounter   Medications    HYDROcodone-acetaminophen (NORCO)  mg per tablet     Sig: Take 1 tablet by mouth every 8 (eight) hours as needed for Pain.     Dispense:  270 tablet     Refill:  0     Quantity prescribed more than 7 day supply? Yes, quantity medically necessary    montelukast (SINGULAIR) 10 mg tablet     Sig: Take 1 tablet (10 mg total) by mouth every evening.     Dispense:  90 tablet     Refill:  4        Follow up in about 3 months (around 4/10/2025), or if symptoms worsen or fail to improve, for Virtual Visit.    If no improvement in symptoms or symptoms worsen, advised to call/follow-up at clinic or go to ER. Patient voiced understanding and all questions/concerns were addressed.   DISCLAIMER: This note was compiled by using a speech recognition dictation system and therefore please be aware that typographical / speech recognition errors can and do occur.  Please contact me if you see any errors specifically.  Consent was obtained for PATRICK recording system prior to the visit.    This note was generated with the assistance of ambient listening technology. Verbal consent was obtained by the patient and accompanying visitor(s) for  the recording of patient appointment to facilitate this note. I attest to having reviewed and edited the generated note for accuracy, though some syntax or spelling errors may persist. Please contact the author of this note for any clarification.    Eliud King M.D.       Office: [phone] [address] [place] [address]  FAX: [phone]

## 2025-01-11 NOTE — ASSESSMENT & PLAN NOTE
Here for medication refills    Previous history:  Discussed with patient that I recommend further evaluation by pain management for further evaluation of his chronic pain and management of his pain medication. He has been referred to Dr. Tereso Asif and is agreeable. Continue pain medication as prescribed per PCP refill request sent to PCP. RTC in 3 months for re-evaluation. Plan was disucssed with Dr. King who was immediately available in clinic. The patient was checked in the [facility]'s  Prescription Monitoring Program. There appears to be no incongruities with the patient's verbalized history.  If no improvement with pain medications patient will be referred to pain management for further evaluation.     Previous plan: An opioid pain contract was completed   after having an extensive conversation about chronic opioid use for pain management. We discussed the risks and benefits of opioids.  I discouraged the patient from escalation of opioid use and try to minimize its use to decrease chance of dependence, tolerance, and opioid-based hyperalgesia.  I reviewed the  in great detail and there are no inconsistencies and it is appropriate with patient's history.  There are no signs of aberrant drug behavior.  The opioid risk tool was also completed, low risk.  Pt counseled about the side effects of long term use of opioids including dependence, tolerance, addiction, respiratory depression, somnolence, immune and endocrine dysfunction.  The patient expressed understanding.

## 2025-01-11 NOTE — PATIENT INSTRUCTIONS
No follow-ups on file.     Dear patient,   As a result of recent federal legislation (The Federal Cures Act), you may receive lab or pathology results from your visit in your [facility] account before your physician is able to contact you. Your physician or their representative will relay the results to you with their recommendations at their soonest availability.     If no improvement in symptoms or symptoms worsen, please be advised to call MD, follow-up at clinic and/or go to ER if becomes severe.    Eliud King M.D.        We Offer TELEHEALTH & Same Day Appointments!   Book your Telehealth appointment with me through my nurse or   Clinic appointments on itravel!    [address] [place] [address]    Office: [phone]   FAX: [phone]    Check out my Facebook Page and Follow Me at: [email or web address]    Check out my website at [employer] by clicking on: [email or web address]    To Schedule appointments online, go to itravel: [email or web address]    Abdomen soft, mild distension.

## 2025-01-11 NOTE — ASSESSMENT & PLAN NOTE
Update labs.  Continue low-carbohydrate diet.We will plan to monitor hemoglobin A1c at designated intervals 3 to 6 months.  I recommend ongoing Education for diabetic diet and exercise protocol.  We will continue to monitor for side effects.    Please be advised of symptoms to monitor for and to notify me immediately if persistent or worsening.  Follow up with Ophthalmology/Optometry and Podiatry at least annually.

## 2025-01-14 ENCOUNTER — PATIENT MESSAGE (OUTPATIENT)
Dept: ADMINISTRATIVE | Facility: HOSPITAL | Age: 70
End: 2025-01-14
Payer: MEDICARE

## 2025-01-29 DIAGNOSIS — E11.9 TYPE 2 DIABETES MELLITUS WITHOUT COMPLICATION: ICD-10-CM

## 2025-02-14 ENCOUNTER — TELEPHONE (OUTPATIENT)
Dept: FAMILY MEDICINE | Facility: CLINIC | Age: 70
End: 2025-02-14
Payer: MEDICARE

## 2025-02-14 NOTE — TELEPHONE ENCOUNTER
----- Message from Salma sent at 2/14/2025 10:35 AM CST -----  Contact: Rosario ([facility])  Mrs. Ponce from [facility] needs a nurse with Dr. King to reach back to her in regards to the patient medication HYDROcodone-acetaminophen (NORCO)  mg per tablet. The best call back number is [phone] reference number is [id number].    Thanks

## 2025-02-14 NOTE — TELEPHONE ENCOUNTER
"Call returned. Pharmacist was advised that it is ok to file pt Norco 10-325mg tablet as directed, "Take 1 tablet by mouth every 8 (eight) hours as needed for Pain. - Oral, w/disp qty 270." No further questions at this time.  "

## 2025-02-22 DIAGNOSIS — Z00.00 ENCOUNTER FOR MEDICARE ANNUAL WELLNESS EXAM: ICD-10-CM

## 2025-04-15 ENCOUNTER — PATIENT MESSAGE (OUTPATIENT)
Dept: ADMINISTRATIVE | Facility: HOSPITAL | Age: 70
End: 2025-04-15
Payer: MEDICARE

## 2025-05-02 ENCOUNTER — OFFICE VISIT (OUTPATIENT)
Dept: FAMILY MEDICINE | Facility: CLINIC | Age: 70
End: 2025-05-02
Payer: MEDICARE

## 2025-05-02 ENCOUNTER — RESULTS FOLLOW-UP (OUTPATIENT)
Dept: FAMILY MEDICINE | Facility: CLINIC | Age: 70
End: 2025-05-02

## 2025-05-02 VITALS
TEMPERATURE: 97 F | HEART RATE: 67 BPM | BODY MASS INDEX: 29.59 KG/M2 | WEIGHT: 218.5 LBS | DIASTOLIC BLOOD PRESSURE: 82 MMHG | HEIGHT: 72 IN | OXYGEN SATURATION: 98 % | SYSTOLIC BLOOD PRESSURE: 138 MMHG

## 2025-05-02 DIAGNOSIS — J32.9 SINUSITIS, UNSPECIFIED CHRONICITY, UNSPECIFIED LOCATION: Primary | ICD-10-CM

## 2025-05-02 DIAGNOSIS — M54.12 CERVICAL RADICULOPATHY: ICD-10-CM

## 2025-05-02 DIAGNOSIS — Z79.899 ENCOUNTER FOR LONG-TERM (CURRENT) USE OF MEDICATIONS: ICD-10-CM

## 2025-05-02 DIAGNOSIS — M51.362 DEGENERATION OF INTERVERTEBRAL DISC OF LUMBAR REGION WITH DISCOGENIC BACK PAIN AND LOWER EXTREMITY PAIN: ICD-10-CM

## 2025-05-02 DIAGNOSIS — R09.81 NASAL CONGESTION: ICD-10-CM

## 2025-05-02 LAB
CTP QC/QA: YES
CTP QC/QA: YES
POC MOLECULAR INFLUENZA A AGN: NEGATIVE
POC MOLECULAR INFLUENZA B AGN: NEGATIVE
SARS-COV-2 RDRP RESP QL NAA+PROBE: NEGATIVE

## 2025-05-02 PROCEDURE — 99999 PR PBB SHADOW E&M-EST. PATIENT-LVL V: CPT | Mod: PBBFAC,,, | Performed by: NURSE PRACTITIONER

## 2025-05-02 PROCEDURE — 99215 OFFICE O/P EST HI 40 MIN: CPT | Mod: PBBFAC,PO | Performed by: NURSE PRACTITIONER

## 2025-05-02 RX ORDER — CETIRIZINE HYDROCHLORIDE 10 MG/1
10 TABLET ORAL DAILY
Qty: 30 TABLET | Refills: 0 | Status: SHIPPED | OUTPATIENT
Start: 2025-05-02 | End: 2025-05-12

## 2025-05-02 RX ORDER — CETIRIZINE HYDROCHLORIDE 10 MG/1
10 TABLET ORAL
Status: CANCELLED | OUTPATIENT
Start: 2025-05-02

## 2025-05-02 RX ORDER — AMOXICILLIN 875 MG/1
875 TABLET, FILM COATED ORAL EVERY 12 HOURS
Qty: 14 TABLET | Refills: 0 | Status: SHIPPED | OUTPATIENT
Start: 2025-05-02 | End: 2025-05-09

## 2025-05-02 NOTE — PATIENT INSTRUCTIONS
Hydrate well  Rest  Flonase as prescribed  Zyrtec in AM; singulair in PM  Report to ER immediately if symptoms worsen or persist

## 2025-05-02 NOTE — PROGRESS NOTES
Subjective     Patient ID: Migue Sellers is a 69 y.o. male.    Chief Complaint: No chief complaint on file.    Sinus Problem  This is a new problem. The current episode started in the past 7 days. The problem is unchanged. There has been no fever. The pain is mild. Associated symptoms include congestion and sinus pressure. Pertinent negatives include no chills, coughing, diaphoresis, ear pain, headaches, hoarse voice, neck pain, shortness of breath, sneezing, sore throat or swollen glands. Past treatments include nothing. The treatment provided no relief.     Past Medical History:   Diagnosis Date    Anticoagulant long-term use     aspirn    Asthma     Cervical stenosis of spine     Colon polyps 06/08/2015    per colonoscopy report in     DDD (degenerative disc disease), lumbar     Depression     DJD (degenerative joint disease)     Hyperlipidemia     Hypertension     Intervertebral disc protrusion     Seasonal allergies     Skin abnormalities      Social History[1]  Past Surgical History:   Procedure Laterality Date    cervical injection      COLONOSCOPY  06/08/2015    Dr. Decker: 3 colon polyps removed; repeat in 5 years for surveillance; biopsy:Hyperplastic polyps    COLONOSCOPY N/A 10/5/2022    Procedure: COLONOSCOPY;  Surgeon: Isael Decker MD;  Location: [facility];  Service: Endoscopy;  Laterality: N/A;    EPIDURAL STEROID INJECTION INTO CERVICAL SPINE N/A 02/04/2019    Procedure: Injection-steroid-epidural-cervical;  Surgeon: Phil Ann MD;  Location: [facility] OR;  Service: Pain Management;  Laterality: N/A;  to the LEFT    EPIDURAL STEROID INJECTION INTO CERVICAL SPINE N/A 09/25/2019    Procedure: Injection-steroid-epidural-cervical;  Surgeon: Phil Ann MD;  Location: [facility] OR;  Service: Pain Management;  Laterality: N/A;    EPIDURAL STEROID INJECTION INTO CERVICAL SPINE N/A 02/07/2020    Procedure: Injection-steroid-epidural-cervical;  Surgeon: Phil Ann MD;  Location: [facility]  OR;  Service: Pain Management;  Laterality: N/A;    EPIDURAL STEROID INJECTION INTO CERVICAL SPINE N/A 07/27/2020    Procedure: Injection-steroid-epidural-cervical;  Surgeon: Phil Ann MD;  Location: [facility] OR;  Service: Pain Management;  Laterality: N/A;    EPIDURAL STEROID INJECTION INTO LUMBAR SPINE N/A 11/05/2019    Procedure: Injection-steroid-epidural- lumbar L5/S1;  Surgeon: Phil Ann MD;  Location: [facility] OR;  Service: Pain Management;  Laterality: N/A;    EYE SURGERY      MULTIPLE TOOTH EXTRACTIONS         Review of Systems   Constitutional: Negative.  Negative for chills and diaphoresis.   HENT:  Positive for nasal congestion and sinus pressure/congestion. Negative for ear pain, hoarse voice, sneezing and sore throat.    Eyes: Negative.    Respiratory: Negative.  Negative for cough and shortness of breath.    Cardiovascular: Negative.    Gastrointestinal: Negative.    Endocrine: Negative.    Genitourinary: Negative.    Musculoskeletal: Negative.  Negative for neck pain.   Integumentary:  Negative.   Allergic/Immunologic: Negative.    Neurological: Negative.  Negative for headaches.   Psychiatric/Behavioral: Negative.            Objective     Physical Exam  Vitals and nursing note reviewed.   Constitutional:       Appearance: Normal appearance.   HENT:      Head: Normocephalic.      Right Ear: Hearing, tympanic membrane, ear canal and external ear normal.      Left Ear: Hearing, tympanic membrane, ear canal and external ear normal.      Nose: Congestion and rhinorrhea present.      Mouth/Throat:      Mouth: Mucous membranes are moist.      Pharynx: Oropharynx is clear.   Eyes:      Conjunctiva/sclera: Conjunctivae normal.      Pupils: Pupils are equal, round, and reactive to light.   Cardiovascular:      Rate and Rhythm: Normal rate and regular rhythm.      Pulses: Normal pulses.      Heart sounds: Normal heart sounds.   Pulmonary:      Effort: Pulmonary effort is normal.      Breath sounds:  Normal breath sounds.   Abdominal:      General: Bowel sounds are normal.      Palpations: Abdomen is soft.   Musculoskeletal:         General: Normal range of motion.      Cervical back: Normal range of motion and neck supple.   Skin:     General: Skin is warm and dry.      Capillary Refill: Capillary refill takes 2 to 3 seconds.   Neurological:      Mental Status: He is alert and oriented to person, place, and time.   Psychiatric:         Mood and Affect: Mood normal.         Behavior: Behavior normal.         Thought Content: Thought content normal.         Judgment: Judgment normal.            Assessment and Plan     1. Sinusitis, unspecified chronicity, unspecified location  2. Nasal congestion  -     POCT Influenza A/B Molecular  -     POCT COVID-19 Rapid Screening  -     amoxicillin (AMOXIL) 875 MG tablet; Take 1 tablet (875 mg total) by mouth every 12 (twelve) hours. for 7 days  Dispense: 14 tablet; Refill: 0  -     cetirizine (ZYRTEC) 10 MG tablet; Take 1 tablet (10 mg total) by mouth once daily. for 10 days  Dispense: 30 tablet; Refill: 0  Hydrate well  Rest  Flonase as prescribed  Zyrtec in AM; singulair in PM  Report to ER immediately if symptoms worsen or persist               No follow-ups on file.         [1]   Social History  Socioeconomic History    Marital status:     Number of children: 3   Tobacco Use    Smoking status: Some Days     Current packs/day: 0.00     Types: Cigarettes     Last attempt to quit: 2015     Years since quittin.9     Passive exposure: Never    Smokeless tobacco: Never    Tobacco comments:     smokes once per month   Substance and Sexual Activity    Alcohol use: No    Drug use: No

## 2025-05-03 RX ORDER — HYDROCODONE BITARTRATE AND ACETAMINOPHEN 10; 325 MG/1; MG/1
1 TABLET ORAL EVERY 8 HOURS PRN
Qty: 270 TABLET | Refills: 0 | Status: SHIPPED | OUTPATIENT
Start: 2025-05-03

## 2025-06-02 ENCOUNTER — PATIENT OUTREACH (OUTPATIENT)
Dept: ADMINISTRATIVE | Facility: HOSPITAL | Age: 70
End: 2025-06-02
Payer: MEDICARE

## 2025-08-04 ENCOUNTER — OFFICE VISIT (OUTPATIENT)
Dept: FAMILY MEDICINE | Facility: CLINIC | Age: 70
End: 2025-08-04
Payer: MEDICARE

## 2025-08-04 ENCOUNTER — PATIENT MESSAGE (OUTPATIENT)
Dept: ADMINISTRATIVE | Facility: HOSPITAL | Age: 70
End: 2025-08-04
Payer: MEDICARE

## 2025-08-04 ENCOUNTER — LAB VISIT (OUTPATIENT)
Dept: LAB | Facility: HOSPITAL | Age: 70
End: 2025-08-04
Attending: FAMILY MEDICINE
Payer: MEDICARE

## 2025-08-04 VITALS
WEIGHT: 217.19 LBS | HEIGHT: 72 IN | DIASTOLIC BLOOD PRESSURE: 75 MMHG | BODY MASS INDEX: 29.42 KG/M2 | TEMPERATURE: 97 F | OXYGEN SATURATION: 98 % | SYSTOLIC BLOOD PRESSURE: 127 MMHG | HEART RATE: 60 BPM

## 2025-08-04 DIAGNOSIS — M54.12 CERVICAL RADICULOPATHY: ICD-10-CM

## 2025-08-04 DIAGNOSIS — Z79.899 ENCOUNTER FOR LONG-TERM (CURRENT) USE OF MEDICATIONS: ICD-10-CM

## 2025-08-04 DIAGNOSIS — Z76.0 MEDICATION REFILL: Primary | ICD-10-CM

## 2025-08-04 DIAGNOSIS — Z12.5 ENCOUNTER FOR PROSTATE CANCER SCREENING: ICD-10-CM

## 2025-08-04 DIAGNOSIS — M51.362 DEGENERATION OF INTERVERTEBRAL DISC OF LUMBAR REGION WITH DISCOGENIC BACK PAIN AND LOWER EXTREMITY PAIN: ICD-10-CM

## 2025-08-04 LAB — PSA SERPL-MCNC: 1.34 NG/ML

## 2025-08-04 PROCEDURE — G2211 COMPLEX E/M VISIT ADD ON: HCPCS | Mod: ,,, | Performed by: NURSE PRACTITIONER

## 2025-08-04 PROCEDURE — 99999 PR PBB SHADOW E&M-EST. PATIENT-LVL V: CPT | Mod: PBBFAC,,, | Performed by: NURSE PRACTITIONER

## 2025-08-04 PROCEDURE — 99215 OFFICE O/P EST HI 40 MIN: CPT | Mod: PBBFAC,PO | Performed by: NURSE PRACTITIONER

## 2025-08-04 PROCEDURE — 84153 ASSAY OF PSA TOTAL: CPT

## 2025-08-04 PROCEDURE — 99213 OFFICE O/P EST LOW 20 MIN: CPT | Mod: S$PBB,,, | Performed by: NURSE PRACTITIONER

## 2025-08-04 PROCEDURE — 36415 COLL VENOUS BLD VENIPUNCTURE: CPT | Mod: PO

## 2025-08-04 RX ORDER — HYDROCODONE BITARTRATE AND ACETAMINOPHEN 10; 325 MG/1; MG/1
1 TABLET ORAL EVERY 8 HOURS PRN
Qty: 270 TABLET | Refills: 0 | Status: SHIPPED | OUTPATIENT
Start: 2025-08-04

## 2025-08-04 NOTE — PATIENT INSTRUCTIONS
Norco refill request sent to PCP to approve  Hydrate well  Rest  Report to ER immediately if symptoms worsen or persist      Hi Don,     If you are due for any health screening(s) below please notify me so we can arrange them to be ordered and scheduled. Most healthy patients at your age complete them, but you are free to accept or refuse.     If you can't do it, I'll definitely understand. If you can, I'd certainly appreciate it!    Tests to Keep You Healthy    Eye Exam: DUE  Colon Cancer Screening: Met on 10/5/2022  Last Blood Pressure <= 139/89 (5/2/2025): Yes  Last HbA1c < 8 (03/10/2025): Yes  Tobacco Cessation: NO      Your diabetic retinal eye exam is due     Diabetes is the #1 cause of blindness in the [place] - early detection before signs or symptoms develop can prevent debilitating blindness.     Our records indicate that you may be overdue for your annual diabetic eye exam. Eye screening can help identify patients at risk for developing vision loss which is common in diabetes. This simple screening is an important step to keeping you healthy and preventing complications from diabetes.     This recommended diabetic eye exam should take place once per year and can prevent and treat diabetes complications in the eye before developing symptoms. This can be done with a special camera is used to take photographs of the back of your eye without having to dilate them, or you can see an eye doctor for a full dilated exam.     If you recently had your yearly diabetic eye exam performed outside of [facility], please let your Health care team know so that they can update your health record.        Were here to help you quit smoking     Our records indicated that you are still smoking. One of the best things you can do for your health is to stop smoking and we are here to help.     Talk with your provider about our Smoking Cessation Program and how we can support you on your journey.

## 2025-08-04 NOTE — PROGRESS NOTES
Subjective     Patient ID: Migue Sellers is a 69 y.o. male.    Chief Complaint: Medication Refill    Medication Refill  Chronicity: Pt requesting norco refill; chronic pain managed by PCP. The current episode started more than 1 year ago. The problem occurs daily. The problem has been gradually improving. Pertinent negatives include no abdominal pain, anorexia, arthralgias, change in bowel habit, chest pain, chills, congestion, coughing, diaphoresis, fatigue, fever, headaches, joint swelling, myalgias, nausea, neck pain, numbness, rash, sore throat, swollen glands, urinary symptoms, vertigo, visual change, vomiting or weakness. Nothing aggravates the symptoms. Treatments tried: Norco. The treatment provided significant relief.   Records for diabetic eye exam requested; [facility].   Past Medical History:   Diagnosis Date    Anticoagulant long-term use     aspirn    Asthma     Cervical stenosis of spine     Colon polyps 06/08/2015    per colonoscopy report in     DDD (degenerative disc disease), lumbar     Depression     DJD (degenerative joint disease)     Hyperlipidemia     Hypertension     Intervertebral disc protrusion     Seasonal allergies     Skin abnormalities      Social History[1]  Past Surgical History:   Procedure Laterality Date    cervical injection      COLONOSCOPY  06/08/2015    Dr. Decker: 3 colon polyps removed; repeat in 5 years for surveillance; biopsy:Hyperplastic polyps    COLONOSCOPY N/A 10/5/2022    Procedure: COLONOSCOPY;  Surgeon: Isael Decker MD;  Location: [facility] ENDO;  Service: Endoscopy;  Laterality: N/A;    EPIDURAL STEROID INJECTION INTO CERVICAL SPINE N/A 02/04/2019    Procedure: Injection-steroid-epidural-cervical;  Surgeon: Phil Ann MD;  Location: [facility] OR;  Service: Pain Management;  Laterality: N/A;  to the LEFT    EPIDURAL STEROID INJECTION INTO CERVICAL SPINE N/A 09/25/2019    Procedure: Injection-steroid-epidural-cervical;  Surgeon:  Phil Ann MD;  Location: [facility] OR;  Service: Pain Management;  Laterality: N/A;    EPIDURAL STEROID INJECTION INTO CERVICAL SPINE N/A 02/07/2020    Procedure: Injection-steroid-epidural-cervical;  Surgeon: Phil Ann MD;  Location: [facility] OR;  Service: Pain Management;  Laterality: N/A;    EPIDURAL STEROID INJECTION INTO CERVICAL SPINE N/A 07/27/2020    Procedure: Injection-steroid-epidural-cervical;  Surgeon: Phil Ann MD;  Location: [facility] OR;  Service: Pain Management;  Laterality: N/A;    EPIDURAL STEROID INJECTION INTO LUMBAR SPINE N/A 11/05/2019    Procedure: Injection-steroid-epidural- lumbar L5/S1;  Surgeon: Phil Ann MD;  Location: [facility] OR;  Service: Pain Management;  Laterality: N/A;    EYE SURGERY      MULTIPLE TOOTH EXTRACTIONS         Review of Systems   Constitutional: Negative.  Negative for chills, diaphoresis, fatigue and fever.   HENT: Negative.  Negative for nasal congestion and sore throat.    Eyes: Negative.    Respiratory: Negative.  Negative for cough.    Cardiovascular: Negative.  Negative for chest pain.   Gastrointestinal: Negative.  Negative for abdominal pain, anorexia, change in bowel habit, nausea and vomiting.   Endocrine: Negative.    Genitourinary: Negative.    Musculoskeletal: Negative.  Negative for arthralgias, joint swelling, myalgias and neck pain.   Integumentary:  Negative for rash. Negative.   Allergic/Immunologic: Negative.    Neurological: Negative.  Negative for vertigo, weakness, numbness and headaches.   Psychiatric/Behavioral: Negative.            Objective     Physical Exam  Vitals and nursing note reviewed.   Constitutional:       Appearance: Normal appearance.   HENT:      Head: Normocephalic.      Right Ear: Tympanic membrane, ear canal and external ear normal.      Left Ear: Tympanic membrane, ear canal and external ear normal.      Nose: Nose normal.      Mouth/Throat:      Mouth: Mucous membranes are moist.      Pharynx:  Oropharynx is clear.   Eyes:      Conjunctiva/sclera: Conjunctivae normal.      Pupils: Pupils are equal, round, and reactive to light.   Cardiovascular:      Rate and Rhythm: Normal rate and regular rhythm.      Pulses: Normal pulses.      Heart sounds: Normal heart sounds.   Pulmonary:      Effort: Pulmonary effort is normal.      Breath sounds: Normal breath sounds.   Abdominal:      General: Bowel sounds are normal.      Palpations: Abdomen is soft.   Musculoskeletal:         General: Normal range of motion.      Cervical back: Normal range of motion.   Skin:     General: Skin is warm and dry.      Capillary Refill: Capillary refill takes 2 to 3 seconds.   Neurological:      Mental Status: He is alert and oriented to person, place, and time.   Psychiatric:         Mood and Affect: Mood normal.         Behavior: Behavior normal.         Thought Content: Thought content normal.         Judgment: Judgment normal.            Assessment and Plan     1. Medication refill  Norco refill request sent to PCP to approve  Hydrate well  Rest  Report to ER immediately if symptoms worsen or persist                 No follow-ups on file.         [1]   Social History  Socioeconomic History    Marital status:     Number of children: 3   Tobacco Use    Smoking status: Some Days     Current packs/day: 0.00     Types: Cigarettes     Last attempt to quit: 5/12/2015     Years since quitting: 10.2     Passive exposure: Never    Smokeless tobacco: Never    Tobacco comments:     smokes once per month   Substance and Sexual Activity    Alcohol use: No    Drug use: No     Social Drivers of Health     Financial Resource Strain: Low Risk  (7/28/2025)    Overall Financial Resource Strain (CARDIA)     Difficulty of Paying Living Expenses: Not hard at all   Food Insecurity: No Food Insecurity (7/28/2025)    Hunger Vital Sign     Worried About Running Out of Food in the Last Year: Never true     Ran Out of Food in the Last Year: Never  true   Transportation Needs: No Transportation Needs (7/28/2025)    PRAPARE - Transportation     Lack of Transportation (Medical): No     Lack of Transportation (Non-Medical): No   Physical Activity: Insufficiently Active (7/28/2025)    Exercise Vital Sign     Days of Exercise per Week: 3 days     Minutes of Exercise per Session: 10 min   Stress: No Stress Concern Present (7/28/2025)    [employer] - Occupational Stress Questionnaire     Feeling of Stress : Only a little   Housing Stability: Low Risk  (7/28/2025)    Housing Stability Vital Sign     Unable to Pay for Housing in the Last Year: No     Number of Times Moved in the Last Year: 0     Homeless in the Last Year: No

## 2025-08-19 ENCOUNTER — PATIENT MESSAGE (OUTPATIENT)
Dept: ADMINISTRATIVE | Facility: HOSPITAL | Age: 70
End: 2025-08-19
Payer: MEDICARE

## 2025-09-02 RX ORDER — CETIRIZINE HYDROCHLORIDE 10 MG/1
TABLET ORAL
Refills: 0 | OUTPATIENT
Start: 2025-09-02

## (undated) DEVICE — APPLICATOR CHLORAPREP CLR 10.5

## (undated) DEVICE — SYR GLASS 5CC LUER LOK

## (undated) DEVICE — MARKER SKIN STND TIP BLUE BARR

## (undated) DEVICE — NDL TUOHY EPIDURAL 20G X 3.5

## (undated) DEVICE — TRAY NERVE BLOCK

## (undated) DEVICE — GLOVE SURGICAL LATEX SZ 7